# Patient Record
Sex: FEMALE | Race: WHITE | NOT HISPANIC OR LATINO | Employment: OTHER | URBAN - METROPOLITAN AREA
[De-identification: names, ages, dates, MRNs, and addresses within clinical notes are randomized per-mention and may not be internally consistent; named-entity substitution may affect disease eponyms.]

---

## 2017-07-11 ENCOUNTER — HOSPITAL ENCOUNTER (OUTPATIENT)
Dept: OTHER | Facility: HOSPITAL | Age: 72
Discharge: HOME | End: 2017-07-11

## 2018-01-26 ENCOUNTER — CONVERSION ENCOUNTER (OUTPATIENT)
Dept: FAMILY MEDICINE CLINIC | Facility: CLINIC | Age: 73
End: 2018-01-26

## 2018-01-26 ENCOUNTER — OFFICE VISIT CONVERTED (OUTPATIENT)
Dept: FAMILY MEDICINE CLINIC | Facility: CLINIC | Age: 73
End: 2018-01-26
Attending: FAMILY MEDICINE

## 2018-02-06 ENCOUNTER — OFFICE VISIT CONVERTED (OUTPATIENT)
Dept: FAMILY MEDICINE CLINIC | Facility: CLINIC | Age: 73
End: 2018-02-06
Attending: FAMILY MEDICINE

## 2018-09-24 ENCOUNTER — HOSPITAL ENCOUNTER (OUTPATIENT)
Dept: OTHER | Facility: HOSPITAL | Age: 73
Discharge: HOME | End: 2018-09-24

## 2019-01-24 ENCOUNTER — HOSPITAL ENCOUNTER (OUTPATIENT)
Dept: URGENT CARE | Facility: CLINIC | Age: 74
Discharge: HOME | End: 2019-01-24

## 2019-02-12 ENCOUNTER — HOSPITAL ENCOUNTER (OUTPATIENT)
Dept: OTHER | Facility: HOSPITAL | Age: 74
Discharge: HOME | End: 2019-02-12
Attending: NURSE PRACTITIONER

## 2019-02-12 ENCOUNTER — OFFICE VISIT CONVERTED (OUTPATIENT)
Dept: INTERNAL MEDICINE | Facility: CLINIC | Age: 74
End: 2019-02-12
Attending: NURSE PRACTITIONER

## 2019-02-12 ENCOUNTER — CONVERSION ENCOUNTER (OUTPATIENT)
Dept: INTERNAL MEDICINE | Facility: CLINIC | Age: 74
End: 2019-02-12

## 2019-02-12 LAB
ALBUMIN SERPL-MCNC: 4 G/DL (ref 3.5–5)
ALBUMIN/GLOB SERPL: 1.4 {RATIO} (ref 1.4–2.6)
ALP SERPL-CCNC: 79 U/L (ref 43–160)
ALT SERPL-CCNC: 13 U/L (ref 10–40)
ANION GAP SERPL CALC-SCNC: 14 MMOL/L (ref 8–19)
AST SERPL-CCNC: 22 U/L (ref 15–50)
BASOPHILS # BLD AUTO: 0.04 10*3/UL (ref 0–0.2)
BASOPHILS NFR BLD AUTO: 0.61 % (ref 0–3)
BILIRUB SERPL-MCNC: 0.46 MG/DL (ref 0.2–1.3)
BUN SERPL-MCNC: 17 MG/DL (ref 5–25)
BUN/CREAT SERPL: 21 {RATIO} (ref 6–20)
CALCIUM SERPL-MCNC: 9.3 MG/DL (ref 8.7–10.4)
CHLORIDE SERPL-SCNC: 107 MMOL/L (ref 99–111)
CHOLEST SERPL-MCNC: 217 MG/DL (ref 107–200)
CHOLEST/HDLC SERPL: 3.1 {RATIO} (ref 3–6)
CONV CO2: 27 MMOL/L (ref 22–32)
CONV TOTAL PROTEIN: 6.9 G/DL (ref 6.3–8.2)
CREAT UR-MCNC: 0.81 MG/DL (ref 0.5–0.9)
EOSINOPHIL # BLD AUTO: 0.23 10*3/UL (ref 0–0.7)
EOSINOPHIL # BLD AUTO: 3.9 % (ref 0–7)
ERYTHROCYTE [DISTWIDTH] IN BLOOD BY AUTOMATED COUNT: 11.9 % (ref 11.5–14.5)
GFR SERPLBLD BASED ON 1.73 SQ M-ARVRAT: >60 ML/MIN/{1.73_M2}
GLOBULIN UR ELPH-MCNC: 2.9 G/DL (ref 2–3.5)
GLUCOSE SERPL-MCNC: 92 MG/DL (ref 65–99)
HBA1C MFR BLD: 13.5 G/DL (ref 12–16)
HCT VFR BLD AUTO: 41 % (ref 37–47)
HDLC SERPL-MCNC: 69 MG/DL (ref 40–60)
LDLC SERPL CALC-MCNC: 127 MG/DL (ref 70–100)
LYMPHOCYTES # BLD AUTO: 1.22 10*3/UL (ref 1–5)
MCH RBC QN AUTO: 33.2 PG (ref 27–31)
MCHC RBC AUTO-ENTMCNC: 32.8 G/DL (ref 33–37)
MCV RBC AUTO: 101 FL (ref 81–99)
MONOCYTES # BLD AUTO: 0.69 10*3/UL (ref 0.2–1.2)
MONOCYTES NFR BLD AUTO: 11.9 % (ref 3–10)
NEUTROPHILS # BLD AUTO: 3.67 10*3/UL (ref 2–8)
NEUTROPHILS NFR BLD AUTO: 62.8 % (ref 30–85)
NRBC BLD AUTO-RTO: 0 % (ref 0–0.01)
OSMOLALITY SERPL CALC.SUM OF ELEC: 297 MOSM/KG (ref 273–304)
PLATELET # BLD AUTO: 318 10*3/UL (ref 130–400)
PMV BLD AUTO: 7 FL (ref 7.4–10.4)
POTASSIUM SERPL-SCNC: 4.6 MMOL/L (ref 3.5–5.3)
RBC # BLD AUTO: 4.06 10*6/UL (ref 4.2–5.4)
SODIUM SERPL-SCNC: 143 MMOL/L (ref 135–147)
T4 FREE SERPL-MCNC: 1.2 NG/DL (ref 0.9–1.8)
TRIGL SERPL-MCNC: 105 MG/DL (ref 40–150)
TSH SERPL-ACNC: 2.15 M[IU]/L (ref 0.27–4.2)
VARIANT LYMPHS NFR BLD MANUAL: 20.8 % (ref 20–45)
VLDLC SERPL-MCNC: 21 MG/DL (ref 5–37)
WBC # BLD AUTO: 5.84 10*3/UL (ref 4.8–10.8)

## 2019-04-08 ENCOUNTER — HOSPITAL ENCOUNTER (OUTPATIENT)
Dept: OTHER | Facility: HOSPITAL | Age: 74
Discharge: HOME | End: 2019-04-08
Attending: NURSE PRACTITIONER

## 2019-04-08 LAB
IRON SATN MFR SERPL: 12 % (ref 20–55)
IRON SERPL-MCNC: 37 UG/DL (ref 60–170)
TIBC SERPL-MCNC: 303 UG/DL (ref 245–450)
TRANSFERRIN SERPL-MCNC: 212 MG/DL (ref 250–380)

## 2019-04-09 LAB
FOLATE SERPL-MCNC: >20 NG/ML (ref 4.8–20)
VIT B12 SERPL-MCNC: 1830 PG/ML (ref 211–911)

## 2019-05-20 ENCOUNTER — OFFICE VISIT CONVERTED (OUTPATIENT)
Dept: INTERNAL MEDICINE | Facility: CLINIC | Age: 74
End: 2019-05-20
Attending: NURSE PRACTITIONER

## 2019-08-16 ENCOUNTER — HOSPITAL ENCOUNTER (OUTPATIENT)
Dept: OTHER | Facility: HOSPITAL | Age: 74
Discharge: HOME | End: 2019-08-16
Attending: NURSE PRACTITIONER

## 2019-08-16 LAB
ALBUMIN SERPL-MCNC: 4.3 G/DL (ref 3.5–5)
ALBUMIN/GLOB SERPL: 1.7 {RATIO} (ref 1.4–2.6)
ALP SERPL-CCNC: 65 U/L (ref 43–160)
ALT SERPL-CCNC: 31 U/L (ref 10–40)
ANION GAP SERPL CALC-SCNC: 16 MMOL/L (ref 8–19)
AST SERPL-CCNC: 36 U/L (ref 15–50)
BILIRUB SERPL-MCNC: 0.54 MG/DL (ref 0.2–1.3)
BUN SERPL-MCNC: 17 MG/DL (ref 5–25)
BUN/CREAT SERPL: 23 {RATIO} (ref 6–20)
CALCIUM SERPL-MCNC: 9.2 MG/DL (ref 8.7–10.4)
CHLORIDE SERPL-SCNC: 105 MMOL/L (ref 99–111)
CHOLEST SERPL-MCNC: 153 MG/DL (ref 107–200)
CHOLEST/HDLC SERPL: 1.9 {RATIO} (ref 3–6)
CONV CO2: 25 MMOL/L (ref 22–32)
CONV TOTAL PROTEIN: 6.8 G/DL (ref 6.3–8.2)
CREAT UR-MCNC: 0.74 MG/DL (ref 0.5–0.9)
CRP SERPL HS-MCNC: <0.15 MG/DL (ref 0–0.5)
GFR SERPLBLD BASED ON 1.73 SQ M-ARVRAT: >60 ML/MIN/{1.73_M2}
GLOBULIN UR ELPH-MCNC: 2.5 G/DL (ref 2–3.5)
GLUCOSE SERPL-MCNC: 90 MG/DL (ref 65–99)
HDLC SERPL-MCNC: 82 MG/DL (ref 40–60)
LDLC SERPL CALC-MCNC: 51 MG/DL (ref 70–100)
OSMOLALITY SERPL CALC.SUM OF ELEC: 295 MOSM/KG (ref 273–304)
POTASSIUM SERPL-SCNC: 4.3 MMOL/L (ref 3.5–5.3)
SODIUM SERPL-SCNC: 142 MMOL/L (ref 135–147)
TRIGL SERPL-MCNC: 101 MG/DL (ref 40–150)
VLDLC SERPL-MCNC: 20 MG/DL (ref 5–37)

## 2019-08-19 ENCOUNTER — OFFICE VISIT CONVERTED (OUTPATIENT)
Dept: INTERNAL MEDICINE | Facility: CLINIC | Age: 74
End: 2019-08-19
Attending: NURSE PRACTITIONER

## 2019-11-20 ENCOUNTER — CONVERSION ENCOUNTER (OUTPATIENT)
Dept: INTERNAL MEDICINE | Facility: CLINIC | Age: 74
End: 2019-11-20

## 2019-11-20 ENCOUNTER — HOSPITAL ENCOUNTER (OUTPATIENT)
Dept: OTHER | Facility: HOSPITAL | Age: 74
Discharge: HOME | End: 2019-11-20
Attending: INTERNAL MEDICINE

## 2019-11-20 ENCOUNTER — OFFICE VISIT CONVERTED (OUTPATIENT)
Dept: INTERNAL MEDICINE | Facility: CLINIC | Age: 74
End: 2019-11-20
Attending: INTERNAL MEDICINE

## 2019-11-20 LAB
ALBUMIN SERPL-MCNC: 4.5 G/DL (ref 3.5–5)
ALBUMIN/GLOB SERPL: 1.6 {RATIO} (ref 1.4–2.6)
ALP SERPL-CCNC: 73 U/L (ref 43–160)
ALT SERPL-CCNC: 29 U/L (ref 10–40)
ANION GAP SERPL CALC-SCNC: 18 MMOL/L (ref 8–19)
AST SERPL-CCNC: 34 U/L (ref 15–50)
BASOPHILS # BLD AUTO: 0.05 10*3/UL (ref 0–0.2)
BASOPHILS NFR BLD AUTO: 0.6 % (ref 0–3)
BILIRUB SERPL-MCNC: 0.61 MG/DL (ref 0.2–1.3)
BUN SERPL-MCNC: 20 MG/DL (ref 5–25)
BUN/CREAT SERPL: 24 {RATIO} (ref 6–20)
CALCIUM SERPL-MCNC: 9.4 MG/DL (ref 8.7–10.4)
CHLORIDE SERPL-SCNC: 97 MMOL/L (ref 99–111)
CHOLEST SERPL-MCNC: 176 MG/DL (ref 107–200)
CHOLEST/HDLC SERPL: 2.3 {RATIO} (ref 3–6)
CONV ABS IMM GRAN: 0.04 10*3/UL (ref 0–0.2)
CONV CO2: 25 MMOL/L (ref 22–32)
CONV IMMATURE GRAN: 0.5 % (ref 0–1.8)
CONV TOTAL PROTEIN: 7.4 G/DL (ref 6.3–8.2)
CREAT UR-MCNC: 0.83 MG/DL (ref 0.5–0.9)
DEPRECATED RDW RBC AUTO: 47.8 FL (ref 36.4–46.3)
EOSINOPHIL # BLD AUTO: 0.1 10*3/UL (ref 0–0.7)
EOSINOPHIL # BLD AUTO: 1.1 % (ref 0–7)
ERYTHROCYTE [DISTWIDTH] IN BLOOD BY AUTOMATED COUNT: 12.8 % (ref 11.7–14.4)
FERRITIN SERPL-MCNC: 535 NG/ML (ref 10–200)
GFR SERPLBLD BASED ON 1.73 SQ M-ARVRAT: >60 ML/MIN/{1.73_M2}
GLOBULIN UR ELPH-MCNC: 2.9 G/DL (ref 2–3.5)
GLUCOSE SERPL-MCNC: 90 MG/DL (ref 65–99)
HCT VFR BLD AUTO: 42.7 % (ref 37–47)
HDLC SERPL-MCNC: 76 MG/DL (ref 40–60)
HGB BLD-MCNC: 14.5 G/DL (ref 12–16)
IRON SATN MFR SERPL: 44 % (ref 20–55)
IRON SERPL-MCNC: 146 UG/DL (ref 60–170)
LDLC SERPL CALC-MCNC: 79 MG/DL (ref 70–100)
LYMPHOCYTES # BLD AUTO: 1.19 10*3/UL (ref 1–5)
LYMPHOCYTES NFR BLD AUTO: 13.5 % (ref 20–45)
MCH RBC QN AUTO: 34 PG (ref 27–31)
MCHC RBC AUTO-ENTMCNC: 34 G/DL (ref 33–37)
MCV RBC AUTO: 100 FL (ref 81–99)
MONOCYTES # BLD AUTO: 0.72 10*3/UL (ref 0.2–1.2)
MONOCYTES NFR BLD AUTO: 8.1 % (ref 3–10)
NEUTROPHILS # BLD AUTO: 6.74 10*3/UL (ref 2–8)
NEUTROPHILS NFR BLD AUTO: 76.2 % (ref 30–85)
NRBC CBCN: 0 % (ref 0–0.7)
OSMOLALITY SERPL CALC.SUM OF ELEC: 284 MOSM/KG (ref 273–304)
PLATELET # BLD AUTO: 309 10*3/UL (ref 130–400)
PMV BLD AUTO: 9.7 FL (ref 9.4–12.3)
POTASSIUM SERPL-SCNC: 4.3 MMOL/L (ref 3.5–5.3)
RBC # BLD AUTO: 4.27 10*6/UL (ref 4.2–5.4)
SODIUM SERPL-SCNC: 136 MMOL/L (ref 135–147)
TIBC SERPL-MCNC: 330 UG/DL (ref 245–450)
TRANSFERRIN SERPL-MCNC: 231 MG/DL (ref 250–380)
TRIGL SERPL-MCNC: 106 MG/DL (ref 40–150)
TSH SERPL-ACNC: 2.43 M[IU]/L (ref 0.27–4.2)
VLDLC SERPL-MCNC: 21 MG/DL (ref 5–37)
WBC # BLD AUTO: 8.84 10*3/UL (ref 4.8–10.8)

## 2019-12-04 ENCOUNTER — OFFICE VISIT CONVERTED (OUTPATIENT)
Dept: INTERNAL MEDICINE | Facility: CLINIC | Age: 74
End: 2019-12-04
Attending: INTERNAL MEDICINE

## 2019-12-04 ENCOUNTER — CONVERSION ENCOUNTER (OUTPATIENT)
Dept: INTERNAL MEDICINE | Facility: CLINIC | Age: 74
End: 2019-12-04

## 2020-01-06 ENCOUNTER — HOSPITAL ENCOUNTER (OUTPATIENT)
Dept: OTHER | Facility: HOSPITAL | Age: 75
Discharge: HOME | End: 2020-01-06

## 2020-01-13 ENCOUNTER — HOSPITAL ENCOUNTER (OUTPATIENT)
Dept: OTHER | Facility: HOSPITAL | Age: 75
Discharge: HOME | End: 2020-01-13

## 2020-01-22 ENCOUNTER — OFFICE VISIT CONVERTED (OUTPATIENT)
Dept: PULMONOLOGY | Facility: CLINIC | Age: 75
End: 2020-01-22
Attending: INTERNAL MEDICINE

## 2020-01-23 ENCOUNTER — OFFICE VISIT CONVERTED (OUTPATIENT)
Dept: INTERNAL MEDICINE | Facility: CLINIC | Age: 75
End: 2020-01-23
Attending: INTERNAL MEDICINE

## 2020-01-23 ENCOUNTER — CONVERSION ENCOUNTER (OUTPATIENT)
Dept: INTERNAL MEDICINE | Facility: CLINIC | Age: 75
End: 2020-01-23

## 2020-01-31 ENCOUNTER — HOSPITAL ENCOUNTER (OUTPATIENT)
Dept: OTHER | Facility: HOSPITAL | Age: 75
Discharge: HOME | End: 2020-01-31

## 2020-01-31 LAB
25(OH)D3 SERPL-MCNC: 54.5 NG/ML (ref 30–100)
BASOPHILS # BLD AUTO: 0.05 10*3/UL (ref 0–0.2)
BASOPHILS NFR BLD AUTO: 0.5 % (ref 0–3)
CONV ABS IMM GRAN: 0.02 10*3/UL (ref 0–0.2)
CONV IMMATURE GRAN: 0.2 % (ref 0–1.8)
DEPRECATED RDW RBC AUTO: 49.7 FL (ref 36.4–46.3)
EOSINOPHIL # BLD AUTO: 0.14 10*3/UL (ref 0–0.7)
EOSINOPHIL # BLD AUTO: 1.5 % (ref 0–7)
ERYTHROCYTE [DISTWIDTH] IN BLOOD BY AUTOMATED COUNT: 13.3 % (ref 11.7–14.4)
FERRITIN SERPL-MCNC: 562 NG/ML (ref 10–200)
HCT VFR BLD AUTO: 40.9 % (ref 37–47)
HGB BLD-MCNC: 13.6 G/DL (ref 12–16)
LYMPHOCYTES # BLD AUTO: 1.63 10*3/UL (ref 1–5)
LYMPHOCYTES NFR BLD AUTO: 17.4 % (ref 20–45)
MCH RBC QN AUTO: 33.4 PG (ref 27–31)
MCHC RBC AUTO-ENTMCNC: 33.3 G/DL (ref 33–37)
MCV RBC AUTO: 100.5 FL (ref 81–99)
MONOCYTES # BLD AUTO: 0.85 10*3/UL (ref 0.2–1.2)
MONOCYTES NFR BLD AUTO: 9.1 % (ref 3–10)
NEUTROPHILS # BLD AUTO: 6.69 10*3/UL (ref 2–8)
NEUTROPHILS NFR BLD AUTO: 71.3 % (ref 30–85)
NRBC CBCN: 0 % (ref 0–0.7)
PLATELET # BLD AUTO: 258 10*3/UL (ref 130–400)
PMV BLD AUTO: 9.9 FL (ref 9.4–12.3)
RBC # BLD AUTO: 4.07 10*6/UL (ref 4.2–5.4)
TSH SERPL-ACNC: 1.87 M[IU]/L (ref 0.27–4.2)
WBC # BLD AUTO: 9.38 10*3/UL (ref 4.8–10.8)

## 2020-03-05 ENCOUNTER — HOSPITAL ENCOUNTER (OUTPATIENT)
Dept: CARDIOLOGY | Facility: HOSPITAL | Age: 75
Discharge: HOME | End: 2020-03-05
Attending: INTERNAL MEDICINE

## 2020-04-15 ENCOUNTER — OFFICE VISIT CONVERTED (OUTPATIENT)
Dept: PULMONOLOGY | Facility: CLINIC | Age: 75
End: 2020-04-15
Attending: INTERNAL MEDICINE

## 2020-06-15 ENCOUNTER — HOSPITAL ENCOUNTER (OUTPATIENT)
Dept: OTHER | Facility: HOSPITAL | Age: 75
Discharge: HOME | End: 2020-06-15
Attending: INTERNAL MEDICINE

## 2020-07-15 ENCOUNTER — HOSPITAL ENCOUNTER (OUTPATIENT)
Dept: OTHER | Facility: HOSPITAL | Age: 75
Discharge: HOME | End: 2020-07-15
Attending: INTERNAL MEDICINE

## 2020-07-15 ENCOUNTER — OFFICE VISIT CONVERTED (OUTPATIENT)
Dept: PULMONOLOGY | Facility: CLINIC | Age: 75
End: 2020-07-15
Attending: INTERNAL MEDICINE

## 2020-07-16 LAB
DSDNA AB SER-ACNC: NEGATIVE [IU]/ML
ENA AB SER IA-ACNC: NEGATIVE {RATIO}

## 2020-07-18 LAB
CONV ANTI NEUTROPHILIC CYTOPLASMIC AB W/REFLEX: NORMAL
CONV QUANTIFERON TB GOLD: POSITIVE
QUANTIFERON CRITERIA: ABNORMAL
QUANTIFERON MITOGEN VALUE: >10 IU/ML
QUANTIFERON NIL VALUE: 0.03 IU/ML
QUANTIFERON TB1 AG VALUE: 0.44 IU/ML
QUANTIFERON TB2 AG VALUE: 0.29 IU/ML

## 2020-07-19 LAB
CONV 1,3-BETA-D-GLUCAN INTERPRETATION: NEGATIVE
CONV 1,3-BETA-D-GLUCAN: <31 PG/ML

## 2020-07-20 ENCOUNTER — HOSPITAL ENCOUNTER (OUTPATIENT)
Dept: PET IMAGING | Facility: HOSPITAL | Age: 75
Discharge: HOME | End: 2020-07-20
Attending: INTERNAL MEDICINE

## 2020-07-20 LAB — IGE SERPL-ACNC: 92 K[IU]/ML (ref 0–24)

## 2020-07-30 ENCOUNTER — HOSPITAL ENCOUNTER (OUTPATIENT)
Dept: OTHER | Facility: HOSPITAL | Age: 75
Discharge: HOME | End: 2020-07-30
Attending: INTERNAL MEDICINE

## 2020-07-30 ENCOUNTER — OFFICE VISIT CONVERTED (OUTPATIENT)
Dept: INTERNAL MEDICINE | Facility: CLINIC | Age: 75
End: 2020-07-30
Attending: INTERNAL MEDICINE

## 2020-07-30 LAB
ALBUMIN SERPL-MCNC: 4.4 G/DL (ref 3.5–5)
ALBUMIN/GLOB SERPL: 1.4 {RATIO} (ref 1.4–2.6)
ALP SERPL-CCNC: 84 U/L (ref 43–160)
ALT SERPL-CCNC: 30 U/L (ref 10–40)
ANION GAP SERPL CALC-SCNC: 18 MMOL/L (ref 8–19)
AST SERPL-CCNC: 41 U/L (ref 15–50)
BASOPHILS # BLD AUTO: 0.04 10*3/UL (ref 0–0.2)
BASOPHILS NFR BLD AUTO: 0.6 % (ref 0–3)
BILIRUB SERPL-MCNC: 0.41 MG/DL (ref 0.2–1.3)
BUN SERPL-MCNC: 14 MG/DL (ref 5–25)
BUN/CREAT SERPL: 15 {RATIO} (ref 6–20)
CALCIUM SERPL-MCNC: 10.5 MG/DL (ref 8.7–10.4)
CHLORIDE SERPL-SCNC: 99 MMOL/L (ref 99–111)
CHOLEST SERPL-MCNC: 146 MG/DL (ref 107–200)
CHOLEST/HDLC SERPL: 2.1 {RATIO} (ref 3–6)
CONV ABS IMM GRAN: 0.02 10*3/UL (ref 0–0.2)
CONV CO2: 27 MMOL/L (ref 22–32)
CONV HIV-1/ HIV-2: NONREACTIVE
CONV IMMATURE GRAN: 0.3 % (ref 0–1.8)
CONV TOTAL PROTEIN: 7.6 G/DL (ref 6.3–8.2)
CREAT UR-MCNC: 0.91 MG/DL (ref 0.5–0.9)
DEPRECATED RDW RBC AUTO: 50.2 FL (ref 36.4–46.3)
EOSINOPHIL # BLD AUTO: 0.08 10*3/UL (ref 0–0.7)
EOSINOPHIL # BLD AUTO: 1.2 % (ref 0–7)
ERYTHROCYTE [DISTWIDTH] IN BLOOD BY AUTOMATED COUNT: 13.4 % (ref 11.7–14.4)
GFR SERPLBLD BASED ON 1.73 SQ M-ARVRAT: >60 ML/MIN/{1.73_M2}
GLOBULIN UR ELPH-MCNC: 3.2 G/DL (ref 2–3.5)
GLUCOSE SERPL-MCNC: 84 MG/DL (ref 65–99)
HCT VFR BLD AUTO: 43.7 % (ref 37–47)
HDLC SERPL-MCNC: 70 MG/DL (ref 40–60)
HGB BLD-MCNC: 14.1 G/DL (ref 12–16)
LDLC SERPL CALC-MCNC: 51 MG/DL (ref 70–100)
LYMPHOCYTES # BLD AUTO: 1.28 10*3/UL (ref 1–5)
LYMPHOCYTES NFR BLD AUTO: 19.5 % (ref 20–45)
MCH RBC QN AUTO: 32.5 PG (ref 27–31)
MCHC RBC AUTO-ENTMCNC: 32.3 G/DL (ref 33–37)
MCV RBC AUTO: 100.7 FL (ref 81–99)
MONOCYTES # BLD AUTO: 0.65 10*3/UL (ref 0.2–1.2)
MONOCYTES NFR BLD AUTO: 9.9 % (ref 3–10)
NEUTROPHILS # BLD AUTO: 4.5 10*3/UL (ref 2–8)
NEUTROPHILS NFR BLD AUTO: 68.5 % (ref 30–85)
NRBC CBCN: 0 % (ref 0–0.7)
OSMOLALITY SERPL CALC.SUM OF ELEC: 290 MOSM/KG (ref 273–304)
PLATELET # BLD AUTO: 293 10*3/UL (ref 130–400)
PMV BLD AUTO: 9.5 FL (ref 9.4–12.3)
POTASSIUM SERPL-SCNC: 4.1 MMOL/L (ref 3.5–5.3)
RBC # BLD AUTO: 4.34 10*6/UL (ref 4.2–5.4)
SODIUM SERPL-SCNC: 140 MMOL/L (ref 135–147)
TRIGL SERPL-MCNC: 126 MG/DL (ref 40–150)
TSH SERPL-ACNC: 2.36 M[IU]/L (ref 0.27–4.2)
VLDLC SERPL-MCNC: 25 MG/DL (ref 5–37)
WBC # BLD AUTO: 6.57 10*3/UL (ref 4.8–10.8)

## 2020-07-31 LAB
CONV HEPATITIS C AB WITH REFLEX TO CONFIRMATION: <0.1 S/CO RATIO (ref 0–0.9)
CONV HEPATITIS COMMENT: NORMAL

## 2020-08-12 ENCOUNTER — OFFICE VISIT CONVERTED (OUTPATIENT)
Dept: PULMONOLOGY | Facility: CLINIC | Age: 75
End: 2020-08-12
Attending: INTERNAL MEDICINE

## 2020-08-21 ENCOUNTER — HOSPITAL ENCOUNTER (OUTPATIENT)
Dept: FAMILY MEDICINE CLINIC | Facility: CLINIC | Age: 75
Discharge: HOME | End: 2020-08-21
Attending: INTERNAL MEDICINE

## 2020-08-23 LAB — SARS-COV-2 RNA SPEC QL NAA+PROBE: NOT DETECTED

## 2020-08-26 ENCOUNTER — HOSPITAL ENCOUNTER (OUTPATIENT)
Dept: GASTROENTEROLOGY | Facility: HOSPITAL | Age: 75
Setting detail: HOSPITAL OUTPATIENT SURGERY
Discharge: HOME | End: 2020-08-26
Attending: INTERNAL MEDICINE

## 2020-08-26 LAB
EPI CELLS NFR FLD: 0 %
LYMPHOCYTES NFR FLD MANUAL: 2 %
MACROPHAGE FLUID: 4 /100{WBCS}
NEUTROPHILS NFR FLD MANUAL: 94 %
VISUAL PRESENCE OF BLOOD: NORMAL

## 2020-08-29 LAB
BACTERIA SPEC AEROBE CULT: NORMAL
CONV BRONCHIAL WASH CULTURE: NORMAL

## 2020-09-01 LAB — CONV NOCARDIA STAIN (PARTIAL,MODIFIED ACID FAST): NORMAL

## 2020-09-02 LAB
CMV DNA SPEC QL NAA+PROBE: NOT DETECTED
CONV CYTOMEGALOVIRUS VIRUS SOURCE: NORMAL

## 2020-09-03 LAB
CONV HERPES SIMPLEX VIRUS QUAL. PCR: NOT DETECTED
HERPES SIMPLEX VIRUS SOURCE: NORMAL

## 2020-09-07 LAB — CONV LEGIONELLA CULTURE: NORMAL

## 2020-09-14 ENCOUNTER — OFFICE VISIT CONVERTED (OUTPATIENT)
Dept: PULMONOLOGY | Facility: CLINIC | Age: 75
End: 2020-09-14
Attending: PHYSICIAN ASSISTANT

## 2020-11-09 ENCOUNTER — OFFICE VISIT (OUTPATIENT)
Dept: INFECTIOUS DISEASES | Facility: CLINIC | Age: 75
End: 2020-11-09

## 2020-11-09 VITALS
DIASTOLIC BLOOD PRESSURE: 78 MMHG | SYSTOLIC BLOOD PRESSURE: 148 MMHG | TEMPERATURE: 97 F | HEIGHT: 64 IN | BODY MASS INDEX: 23.18 KG/M2 | HEART RATE: 54 BPM | WEIGHT: 135.8 LBS

## 2020-11-09 DIAGNOSIS — Z22.7 TB LUNG, LATENT: ICD-10-CM

## 2020-11-09 DIAGNOSIS — B49 FUNGAL PNEUMONIA: Primary | ICD-10-CM

## 2020-11-09 DIAGNOSIS — J16.8 FUNGAL PNEUMONIA: Primary | ICD-10-CM

## 2020-11-09 PROCEDURE — 99205 OFFICE O/P NEW HI 60 MIN: CPT | Performed by: INTERNAL MEDICINE

## 2020-11-09 RX ORDER — MELATONIN 10 MG
TABLET, SUBLINGUAL SUBLINGUAL
COMMUNITY

## 2020-11-09 RX ORDER — ESOMEPRAZOLE MAGNESIUM 40 MG/1
CAPSULE, DELAYED RELEASE ORAL
COMMUNITY
Start: 2020-09-21 | End: 2021-09-30

## 2020-11-09 RX ORDER — HYDROCHLOROTHIAZIDE 25 MG/1
25 TABLET ORAL DAILY
COMMUNITY
End: 2021-07-01 | Stop reason: SDUPTHER

## 2020-11-09 RX ORDER — CYCLOSPORINE 0.5 MG/ML
EMULSION OPHTHALMIC
COMMUNITY
Start: 2020-10-02 | End: 2021-09-30

## 2020-11-09 RX ORDER — CALCIUM CARBONATE 260MG(650)
TABLET,CHEWABLE ORAL
COMMUNITY
End: 2021-09-30

## 2020-11-09 RX ORDER — ASPIRIN 81 MG/1
81 TABLET, CHEWABLE ORAL DAILY
COMMUNITY
End: 2022-09-29

## 2020-11-09 RX ORDER — METOPROLOL SUCCINATE AND HYDROCHLOROTHIAZIDE 12.5; 5 MG/1; MG/1
TABLET ORAL
COMMUNITY
End: 2020-11-09

## 2020-11-09 RX ORDER — METOPROLOL TARTRATE 50 MG/1
50 TABLET, FILM COATED ORAL 2 TIMES DAILY
COMMUNITY
End: 2021-07-02 | Stop reason: SDUPTHER

## 2020-11-09 NOTE — PROGRESS NOTES
cc: This very nice 75-year-old we are asked for evaluation opinion regarding possible fungal pneumonia.    Per the patient as well as the patient's daughter, she says that she underwent a lobectomy for suspected cancer in 2013 but ended up having Aspergillus species.  She did well and establish care with a pulmonologist in spring 2024 care of COPD.  Because she not had follow-up imaging she underwent a repeat surveillance CAT scan of the lungs on Kadie 15, 2020.  This showed a noncalcified right middle lobe nodule.  This was followed up with a PET CT scan on 7/20/2020 which showed the nodule to be decreasing in size and not associated with any hypermetabolic activity.  Patient says that she was exposed to tuberculosis is a 4-year-old child from her father who ultimately succumbed to the infection.  She has had multiple positive PPD since that time but screening chest x-rays were all negative.  Her daughter is also present in the exam room today and says that she thinks her mother took a course of rifampin for prophylaxis although the patient does not have recollection of this.  This was many years ago and they have not been able to get records of this.  She is not on any immunosuppression.    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  The patient is an avid , but not immunocompromised or on any immunosuppression.  Her doctor started her on voriconazole 200 mg 3 times daily but she had disabling visual hallucinations so discontinued this.  She denies any cardiopulmonary symptoms or constitutional symptoms of infection such as fever, chills, night sweats.  She currently feels quite well.    Past medical history: Coronary artery disease, hypertension, peptic ulcer disease, COPD, appendectomy, hysterectomy, cholecystectomy, right ankle screw placement  Family history notable for TB in her father  Social history she lives in [place], [place] with her .  She quit smoking 30 years  "ago.    Review of Systems: All other reviewed and negative except as per HPI    Blood pressure 148/78, pulse 54, temperature 97 °F (36.1 °C), height 162.6 cm (64\"), weight 61.6 kg (135 lb 12.8 oz).  GENERAL: Awake and alert, in no acute distress.   HEENT: Oropharynx is clear. Hearing is grossly normal.   EYES: PERRL. No conjunctival injection. No lid lag.   LYMPHATICS: No lymphadenopathy of the neck or axillary regions.   HEART: Regular rate and rhythm. No peripheral edema.   LUNGS: Clear to auscultation anteriorly with normal respiratory effort.   ABDOMEN: Soft, nontender, nondistended. No appreciable organomegaly.   SKIN: Warm and dry without cutaneous eruptions   PSYCHIATRIC: Appropriate mood, affect, insight, and judgment.       DIAGNOSTICS:      Assessment and Plan  1.  Right middle lobe lung nodule, possible fungal pneumonia: Firstly, I do not have results of the microbiology from her bronchoscopy.  We will obtain these.  By report, the cultures grew Fusarium species.  The significance of the Fusarium is not abundantly clear.  This could be an environmental contaminant, but even if true should not require treatment given her competent immune system.  This is evidenced by the fact that she had had improvement in her imaging even prior to initiation of any antifungal therapy.  I discussed with the family that the side effects from the voriconazole are due to high serum levels of the voriconazole and that if she ever needed to take voriconazole in the future she could just start this at a lower dose.    I recommended that she have another CAT scan of the chest just to document stability and further improvement of the lung nodules.  I would recommend getting this at 6 months from her PET scan so early 2021.  I have gone ahead and ordered at this here in [place], but she is going to reach out to her PCP to see if it could be ordered closer to her residence.    2.  Latent tuberculosis: She should be low risk for " conversion to active TB at this time.  I discussed the chances of conversion to active TB could be higher if she were to require any immunosuppression in the future, but she is not interested in taking any type of prophylaxis at this time (and may have taken prophylaxis earlier which would obviate the need for additional prophylaxis.)    She is going to come back and see me in about 4 months or sooner if necessary

## 2020-12-22 ENCOUNTER — TELEPHONE (OUTPATIENT)
Dept: INFECTIOUS DISEASES | Facility: CLINIC | Age: 75
End: 2020-12-22

## 2021-01-25 ENCOUNTER — HOSPITAL ENCOUNTER (OUTPATIENT)
Dept: CT IMAGING | Facility: HOSPITAL | Age: 76
Discharge: HOME | End: 2021-01-25
Admitting: INTERNAL MEDICINE

## 2021-01-25 DIAGNOSIS — B49 FUNGAL PNEUMONIA: ICD-10-CM

## 2021-01-25 DIAGNOSIS — J16.8 FUNGAL PNEUMONIA: ICD-10-CM

## 2021-01-25 PROCEDURE — 71250 CT THORAX DX C-: CPT

## 2021-02-02 ENCOUNTER — OFFICE VISIT CONVERTED (OUTPATIENT)
Dept: INTERNAL MEDICINE | Facility: CLINIC | Age: 76
End: 2021-02-02
Attending: INTERNAL MEDICINE

## 2021-02-02 ENCOUNTER — HOSPITAL ENCOUNTER (OUTPATIENT)
Dept: OTHER | Facility: HOSPITAL | Age: 76
Discharge: HOME | End: 2021-02-02
Attending: INTERNAL MEDICINE

## 2021-02-02 LAB
ALBUMIN SERPL-MCNC: 4.2 G/DL (ref 3.5–5)
ALBUMIN/GLOB SERPL: 1.5 {RATIO} (ref 1.4–2.6)
ALP SERPL-CCNC: 133 U/L (ref 43–160)
ALT SERPL-CCNC: 24 U/L (ref 10–40)
ANION GAP SERPL CALC-SCNC: 13 MMOL/L (ref 8–19)
AST SERPL-CCNC: 31 U/L (ref 15–50)
BASOPHILS # BLD AUTO: 0.03 10*3/UL (ref 0–0.2)
BASOPHILS NFR BLD AUTO: 0.6 % (ref 0–3)
BILIRUB SERPL-MCNC: 0.4 MG/DL (ref 0.2–1.3)
BUN SERPL-MCNC: 17 MG/DL (ref 5–25)
BUN/CREAT SERPL: 20 {RATIO} (ref 6–20)
CALCIUM SERPL-MCNC: 9.3 MG/DL (ref 8.7–10.4)
CHLORIDE SERPL-SCNC: 105 MMOL/L (ref 99–111)
CHOLEST SERPL-MCNC: 136 MG/DL (ref 107–200)
CHOLEST/HDLC SERPL: 1.9 {RATIO} (ref 3–6)
CONV ABS IMM GRAN: 0.01 10*3/UL (ref 0–0.2)
CONV CO2: 27 MMOL/L (ref 22–32)
CONV IMMATURE GRAN: 0.2 % (ref 0–1.8)
CONV TOTAL PROTEIN: 7 G/DL (ref 6.3–8.2)
CREAT UR-MCNC: 0.86 MG/DL (ref 0.5–0.9)
DEPRECATED RDW RBC AUTO: 48.1 FL (ref 36.4–46.3)
EOSINOPHIL # BLD AUTO: 0.14 10*3/UL (ref 0–0.7)
EOSINOPHIL # BLD AUTO: 2.7 % (ref 0–7)
ERYTHROCYTE [DISTWIDTH] IN BLOOD BY AUTOMATED COUNT: 13.3 % (ref 11.7–14.4)
FERRITIN SERPL-MCNC: 467 NG/ML (ref 10–200)
GFR SERPLBLD BASED ON 1.73 SQ M-ARVRAT: >60 ML/MIN/{1.73_M2}
GLOBULIN UR ELPH-MCNC: 2.8 G/DL (ref 2–3.5)
GLUCOSE SERPL-MCNC: 92 MG/DL (ref 65–99)
HCT VFR BLD AUTO: 42 % (ref 37–47)
HDLC SERPL-MCNC: 71 MG/DL (ref 40–60)
HGB BLD-MCNC: 13.9 G/DL (ref 12–16)
IRON SATN MFR SERPL: 34 % (ref 20–55)
IRON SERPL-MCNC: 101 UG/DL (ref 60–170)
LDLC SERPL CALC-MCNC: 49 MG/DL (ref 70–100)
LYMPHOCYTES # BLD AUTO: 1.2 10*3/UL (ref 1–5)
LYMPHOCYTES NFR BLD AUTO: 23 % (ref 20–45)
MCH RBC QN AUTO: 32.6 PG (ref 27–31)
MCHC RBC AUTO-ENTMCNC: 33.1 G/DL (ref 33–37)
MCV RBC AUTO: 98.4 FL (ref 81–99)
MONOCYTES # BLD AUTO: 0.5 10*3/UL (ref 0.2–1.2)
MONOCYTES NFR BLD AUTO: 9.6 % (ref 3–10)
NEUTROPHILS # BLD AUTO: 3.34 10*3/UL (ref 2–8)
NEUTROPHILS NFR BLD AUTO: 63.9 % (ref 30–85)
NRBC CBCN: 0 % (ref 0–0.7)
OSMOLALITY SERPL CALC.SUM OF ELEC: 293 MOSM/KG (ref 273–304)
PLATELET # BLD AUTO: 269 10*3/UL (ref 130–400)
PMV BLD AUTO: 9.5 FL (ref 9.4–12.3)
POTASSIUM SERPL-SCNC: 4.4 MMOL/L (ref 3.5–5.3)
RBC # BLD AUTO: 4.27 10*6/UL (ref 4.2–5.4)
SODIUM SERPL-SCNC: 141 MMOL/L (ref 135–147)
TIBC SERPL-MCNC: 295 UG/DL (ref 245–450)
TRANSFERRIN SERPL-MCNC: 206 MG/DL (ref 250–380)
TRIGL SERPL-MCNC: 81 MG/DL (ref 40–150)
TSH SERPL-ACNC: 2.19 M[IU]/L (ref 0.27–4.2)
VLDLC SERPL-MCNC: 16 MG/DL (ref 5–37)
WBC # BLD AUTO: 5.22 10*3/UL (ref 4.8–10.8)

## 2021-03-08 ENCOUNTER — OFFICE VISIT (OUTPATIENT)
Dept: INFECTIOUS DISEASES | Facility: CLINIC | Age: 76
End: 2021-03-08

## 2021-03-08 VITALS
BODY MASS INDEX: 23.46 KG/M2 | SYSTOLIC BLOOD PRESSURE: 117 MMHG | WEIGHT: 137.4 LBS | DIASTOLIC BLOOD PRESSURE: 75 MMHG | HEART RATE: 78 BPM | TEMPERATURE: 97.7 F | HEIGHT: 64 IN

## 2021-03-08 DIAGNOSIS — B49 FUNGAL PNEUMONIA: Primary | ICD-10-CM

## 2021-03-08 DIAGNOSIS — Z22.7 TB LUNG, LATENT: ICD-10-CM

## 2021-03-08 DIAGNOSIS — J16.8 FUNGAL PNEUMONIA: Primary | ICD-10-CM

## 2021-03-08 PROCEDURE — 99214 OFFICE O/P EST MOD 30 MIN: CPT | Performed by: INTERNAL MEDICINE

## 2021-03-08 RX ORDER — DIPHENHYDRAMINE HYDROCHLORIDE 25 MG/1
TABLET ORAL
COMMUNITY
End: 2021-09-30

## 2021-03-08 RX ORDER — MULTIPLE VITAMINS W/ MINERALS TAB 9MG-400MCG
1 TAB ORAL DAILY
COMMUNITY
End: 2022-07-12

## 2021-03-08 NOTE — PROGRESS NOTES
cc: This very nice 75-year-old we are asked for evaluation opinion regarding possible fungal pneumonia.    Per initial clinic note:, she says that she underwent a lobectomy for suspected cancer in 2013 but ended up having Aspergillus species.  She did well and establish care with a pulmonologist in spring 2024 care of COPD.  Because she not had follow-up imaging she underwent a repeat surveillance CAT scan of the lungs on Kadie 15, 2020.  This showed a noncalcified right middle lobe nodule.  This was followed up with a PET CT scan on 7/20/2020 which showed the nodule to be decreasing in size and not associated with any hypermetabolic activity.  Patient says that she was exposed to tuberculosis is a 4-year-old child from her father who ultimately succumbed to the infection.  She has had multiple positive PPD since that time but screening chest x-rays were all negative.  Her daughter is also present in the exam room today and says that she thinks her mother took a course of rifampin for prophylaxis although the patient does not have recollection of this.  This was many years ago and they have not been able to get records of this.  She is not on any immunosuppression.    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  The patient is an avid , but not immunocompromised or on any immunosuppression.  Her doctor started her on voriconazole 200 mg 3 times daily but she had disabling visual hallucinations so discontinued this.  She denies any cardiopulmonary symptoms or constitutional symptoms of infection such as fever, chills, night sweats.  She currently feels quite well.    At last clinic visit, she was monitored off voriconazole.  Since that time she reports that she still little.  She has some chronic dyspnea on exertion related to her COPD but this is not changed.  No cough.  No fevers, chills, night sweats, weight loss.  No hemoptysis.  Had interval CT scan as below    Past medical history:  "Coronary artery disease, hypertension, peptic ulcer disease, COPD, appendectomy, hysterectomy, cholecystectomy, right ankle screw placement  Family history notable for TB in her father  Social history she lives in [place], [place] with her .  She quit smoking 30 years ago.    Review of Systems: All other reviewed and negative except as per HPI    Blood pressure 117/75, pulse 78, temperature 97.7 °F (36.5 °C), height 162.6 cm (64.02\"), weight 62.3 kg (137 lb 6.4 oz).  GENERAL: Awake and alert, in no acute distress.   LUNGS:  normal respiratory effort.   PSYCHIATRIC: Appropriate mood, affect, insight, and judgment.       DIAGNOSTICS:  CT chest shows findings of right upper lobe nodularity with sub-5 mm nodules scattered throughout both lungs.  I reviewed her lung images myself and really do not see anything concerning for fungal pneumonia    August 2020 bronchoscopy results were obtained from outside hospital and grew normal ori, Fusarium species, penicillium species and were negative for AFB x2 at 6 weeks    Assessment and Plan  1.  Right middle lobe lung nodule, possible fungal pneumonia: CT scan is stable.  She just has sub-5 mm nodules which have been stable to improving based on serial imaging.  She remains asymptomatic and I would not recommend any additional infectious work-up at this time.  2.  Latent tuberculosis: She should be low risk for conversion to active TB at this time.  I discussed the chances of conversion to active TB could be higher if she were to require any immunosuppression in the future, but she is not interested in taking any type of prophylaxis at this time (and may have taken prophylaxis earlier which would obviate the need for additional prophylaxis.)    I have not scheduled her follow-up appointment, but she can return to see me anytime she needs    "

## 2021-05-07 NOTE — PROGRESS NOTES
Progress Note      Patient Name: Lianne Bardales   Patient ID: [id number]   Sex: Female   YOB: 1945        Visit Date: February 6, 2018    Provider: Andie Crowell MD   Location: [facility]   Location Address: [address] [place] [address]  [id number]   Location Phone: [phone]          Chief Complaint  · Annual Wellness Exam      History Of Present Illness  The patient is a 72 year old /White female who has come to this office for her Annual Wellness Visit. Her Primary Care Provider is Andie Crowell MD. Her comprehensive Care Team list, including suppliers, has been updated on the Facesheet. Her medical/family history, height, weight, BMI, and blood pressure have been reviewed and are in the chart. The Health Risk Assessment has been completed and scanned in the chart.   Medications are listed in the medication list.   The Mini-Cog has been administered and is scanned in chart. The results are negative. Her cognitive function is without limitation.   A hearing loss screen was completed today and the result is negative.   Patient does not have any risk factors for depression. Patient completed the PHQ-9 today and it has been scanned in the chart. The total score is 1-4 indicating minimal depression.   The Timed-Up-and-Go screen was administered today and the result is negative.   The [place] Index of [place] in ADLs indicated full function (score of 6).   A Falls Risk Assessment has been completed, including a review of home fall hazards and medication review.   Overall, the patient's functional ability is noted by this provider to be within normal limits. Her level of safety is noted to be within normal limits. Her balance/gait is within normal limits. There have been no falls in the past year. Patient-specific home safety recommendations have been reviewed and a copy has been given to patient.   She denies issues with leaking urine.   There are  no additional risk factors identified.   Living Will/Advanced Directive has not previously been completed.   Personalized health advice was given to the patient and a written health screening schedule was established; see Plan for details.   Lianne Bardales is a 72 year old /White female who presents for evaluation and treatment of:       Past Medical History  Disease Name Date Onset Notes   COPD (chronic obstructive pulmonary disease) --  --    GERD (gastroesophageal reflux disease) --  --    Knee pain --  --    Osteoporosis --  --    Seasonal allergies --  --    Stomach ulcer --  --          Past Surgical History  Procedure Name Date Notes   Cholecstectomy --  --    Hysterectomy --  --    Lung Biopsy(s) --  --          Medication List  Name Date Started Instructions   Breo Ellipta 200-25 mcg/dose inhalation blister with device 12/26/2017 inhale 1 puff by inhalation route once daily at the same time each day for 30 days   Incruse Ellipta 62.5 mcg/actuation inhalation blister with device 01/26/2018 take 1 puff by inhalation route daily for 1 day   Nexium 40 mg oral capsule,delayed release(DR/EC) 12/26/2017 take 1 capsule (40 mg) by oral route once daily for 90 days         Allergy List  Allergen Name Date Reaction Notes   NO KNOWN DRUG ALLERGIES --  --  --          Family Medical History  Disease Name Relative/Age Notes   Alcohol Abuse / Father    Father/    DM Type II / Mother    Mother/    Hyperlipidemia / --     Son/    Mother, Grandmother, or Sister developed Heart Disease before the age of 65 / --          Social History  Finding Status Start/Stop Quantity Notes   Alcohol Current every day --/-- --  drinks alcohol, 7 or less drinks per week   Exercises regularly --  --/-- --  3-4 times per week   Recreational Drug Use Never --/-- --  never used   Second hand smoke exposure Unknown --/-- --  no   Tobacco Former --/-- 2 PPD former smoker, smokes 1 to 2 packs per day, for 20 years   Uses seatbelts --   "--/-- --  yes         Vitals  Date Time BP Position Site L\R Cuff Size HR RR TEMP(F) WT  HT  BMI kg/m2 BSA m2 O2 Sat HC       02/06/2018 08:30 /76 Sitting    103 - R  97.1 135lbs 0oz 5'  4.5\" 22.81 1.67 94 %               Assessment  · Encounter for Medicare annual wellness exam     V70.0/Z00.00  · Need for influenza vaccination     V04.81/Z23  · Vitamin D deficiency     268.9/E55.9      Plan  · Orders  o Falls Risk Assessment Completed (3288F) - V70.0/Z00.00 - 02/06/2018  o Brief hearing screening (written) [facility] () - V70.0/Z00.00 - 02/06/2018  o Annual wellness visit; includes a personalized prevention plan of service (pps), initial visit () - V70.0/Z00.00 - 02/06/2018  o ACO-13: Fall Risk Screening with no falls in past year or only one fall without injury in the past year (1101F) - V70.0/Z00.00 - 02/06/2018  o Presence or absence of urinary incontinence assessed (JUANITA) (1090F) - V70.0/Z00.00 - 02/06/2018  o Vitamin D Level (92576) - 268.9/E55.9 - 02/06/2018  o ACO-39: Current medications updated and reviewed () - - 02/06/2018  o ACO-20: Screening Mammography documented and reviewed (3014F) - - 02/06/2018  o Influenza immunization was ordered or administered () - V04.81/Z23 - 02/06/2018  o ACO-15: Pneumococcal Vaccine Administered or Previously Received (4040F) - - 02/06/2018  o Fluzone High Dose Flu Vaccine (97237) - V04.81/Z23 - 02/06/2018   Vaccine - Influenza; Dose: 0.5; Site: Left Arm; Route: Intramuscular; Date: 02/06/2018 09:45:00; Exp: 06/06/2018; Lot: [id number]; Mfg: [employer] pasteur; TradeName: Fluzone High-Dose; Administered By: Karli Patel MA; Comment: ndc [phone]  o Hepatitis B Administration - Medicare () - V70.0/Z00.00 - 02/06/2018  o Hepatitis C antibody MEDICARE screening [facility] () - V70.0/Z00.00 - 02/06/2018  · Instructions  o Health Risk Assessment has been reviewed with the patient.  o Written health screening schedule for next 5-10 years was established with " patient; information scanned in chart and given to patient.  o Fall prevention methods discussed and a copy of recommendations given to patient.  o Patient was educated/instructed on their diagnosis, treatment and medications prior to discharge from the clinic today.            Electronically Signed by: Andie Crowell MD -Author on February 6, 2018 10:31:11 AM

## 2021-05-07 NOTE — PROGRESS NOTES
Progress Note      Patient Name: Lianne Bardales   Patient ID: [id number]   Sex: Female   YOB: 1945        Visit Date: January 26, 2018    Provider: Andie Crowell MD   Location: [facility]   Location Address: [address] [place] [address]  [id number]   Location Phone: [phone]          Chief Complaint     Follow up on breathing       History Of Present Illness  Lianne Bardales is a 72 year old /White female who presents for evaluation and treatment of:      She couldn't tell any difference when she used the Incruse in place ot the Tudorza.  Her breathing has been the same.  We have looked at the last PFT and she had mild obstruction while she was on Tudorza and that is the same reading that she is getting today.  she is working on her diet .  She wants to lose 5 lbs before she goes to [facility]. She has already lost 5 lbs by cutting out bread and potatoes.  She is eating more fish and chicken.  She asks for a hand-out.  She likes a variety of foods but her  doesn't.         Past Medical History  Disease Name Date Onset Notes   COPD (chronic obstructive pulmonary disease) --  --    GERD (gastroesophageal reflux disease) --  --    Knee pain --  --    Osteoporosis --  --    Seasonal allergies --  --    Stomach ulcer --  --          Past Surgical History  Procedure Name Date Notes   Cholecstectomy --  --    Hysterectomy --  --    Lung Biopsy(s) --  --          Medication List  Name Date Started Instructions   Breo Ellipta 200-25 mcg/dose inhalation blister with device 12/26/2017 inhale 1 puff by inhalation route once daily at the same time each day for 30 days   Nexium 40 mg oral capsule,delayed release(DR/EC) 12/26/2017 take 1 capsule (40 mg) by oral route once daily for 90 days   Tudorza Pressair 400 mcg/actuation inhalation aerosol powdr breath activated  inhale 1 puff (400 mcg) by inhalation route every 12 hours         Allergy List  Allergen Name  Date Reaction Notes   NO KNOWN DRUG ALLERGIES --  --  --          Family Medical History  Disease Name Relative/Age Notes   Alcohol Abuse / Father    Father/    DM Type II / Mother    Mother/    Hyperlipidemia / --     Son/    Mother, Grandmother, or Sister developed Heart Disease before the age of 65 / --          Social History  Finding Status Start/Stop Quantity Notes   Alcohol Current every day --/-- --  drinks alcohol, 7 or less drinks per week   Exercises regularly --  --/-- --  3-4 times per week   Recreational Drug Use Never --/-- --  never used   Second hand smoke exposure Unknown --/-- --  no   Tobacco Former --/-- 2 PPD former smoker, smokes 1 to 2 packs per day, for 20 years   Uses seatbelts --  --/-- --  yes         Vitals  Date Time BP Position Site L\R Cuff Size HR RR TEMP(F) WT  HT  BMI kg/m2 BSA m2 O2 Sat HC       01/26/2018 09:05 /76 Sitting    102 - R  97.6 138lbs 0oz    96 %           Physical Examination  · Constitutional  o Appearance  o : well developed, well-nourished, in no acute distress  · Eyes  o Conjunctivae  o : conjunctivae normal  · Neck  o Inspection/Palpation  o : supple  o Thyroid  o : no thyromegaly  · Respiratory  o Respiratory Effort  o : breathing unlabored  o Auscultation of Lungs  o : clear to ascultation  · Cardiovascular  o Heart  o :   § Auscultation of Heart  § : regular rate and rhythm  o Peripheral Vascular System  o :   § Extremities  § : no edema  · Lymphatic  o Neck  o : no lymphadenopathy present  · Musculoskeletal  o General  o :   § General Musculoskeletal  § : Muscle tone, strength, and development grossly normal.  · Skin and Subcutaneous Tissue  o General Inspection  o : no lesions present, no areas of discoloration, skin turgor normal, texture normal  · Neurologic  o Gait and Station  o :   § Gait Screening  § : normal gait  · Psychiatric  o Mood and Affect  o : mood normal, affect appropriate          Plan  · Orders  o ACO-39: Current medications updated  and reviewed () - - 01/26/2018  · Medications  o Incruse Ellipta 62.5 mcg/actuation inhalation blister with device   SIG: take 1 puff by inhalation route daily for 1 day   DISP: (1) 7 ct blist pack with 5 refills  Prescribed on 01/26/2018     o Tudorza Pressair 400 mcg/actuation inhalation aerosol powdr breath activated   SIG: inhale 1 puff (400 mcg) by inhalation route every 12 hours   DISP: (1) Aer br.act with 0 refills  Discontinued on 01/26/2018     · Instructions  o Patient was educated/instructed on their diagnosis, treatment and medications prior to discharge from the clinic today.  o weight reduction diet given            Electronically Signed by: Andie Crowell MD -Author on January 26, 2018 05:50:04 PM

## 2021-05-09 VITALS
TEMPERATURE: 97.1 F | DIASTOLIC BLOOD PRESSURE: 76 MMHG | SYSTOLIC BLOOD PRESSURE: 126 MMHG | HEART RATE: 103 BPM | WEIGHT: 135 LBS | HEIGHT: 64 IN | OXYGEN SATURATION: 94 % | BODY MASS INDEX: 23.05 KG/M2

## 2021-05-09 VITALS
HEART RATE: 102 BPM | DIASTOLIC BLOOD PRESSURE: 76 MMHG | WEIGHT: 138 LBS | SYSTOLIC BLOOD PRESSURE: 130 MMHG | TEMPERATURE: 97.6 F | OXYGEN SATURATION: 96 %

## 2021-05-13 NOTE — PROGRESS NOTES
Progress Note      Patient Name: Lianne Bardales   Patient ID: [id number]   Sex: Female   YOB: 1945    Primary Care Provider: Pura VALDEZ    Visit Date: July 30, 2020    Provider: Sam Hampton MD   Location: [facility]   Location Address: [address] [place] [address], [facility] [address]  [place], [place]  [id number]   Location Phone: [phone]          Chief Complaint  · Annual Wellness Exam      History Of Present Illness  The patient is a 75 year old /White female who has come to this office for her Annual Wellness Visit.   Her Primary Care Provider is Sam Hampton MD. Her comprehensive Care Team list, including suppliers, has been updated on the Facesheet. Her medical/family history, height, weight, BMI, and blood pressure have been reviewed and are in the chart. The Health Risk Assessment has been completed and scanned in the chart.   Medications are listed in the medication list.   The active problem list includes: Anemia, iron deficiency, COPD (chronic obstructive pulmonary disease), Gall stones, GERD (gastroesophageal reflux disease), and Shortness of Breath   The patient does not have a history of substance use.   Patient reports her diet is adequate.   The Mini-Cog has been administered and is scanned in chart. The results are negative. Her cognitive function is without limitation.   A hearing loss screen was completed today and the result is negative.   Patient does not have any risk factors for depression. Patient completed the PHQ-9 today and it has been scanned in the chart. The total score is 1-4.   The Get Up and Go screen was administered today and the result is negative.   The [place] Index of [place] in ADLs indicated full function (score of 6).   A Falls Risk Assessment has been completed, including a review of home fall hazards and medication review.   Overall, the patient's functional ability is noted by this provider to be within normal limits. Her  level of safety is noted to be within normal limits. Her balance/gait is within normal limits. There have been no falls in the past year. Patient-specific home safety recommendations have been reviewed and a copy has been given to patient.   She denies issues with leaking urine.   There are no additional risk factors identified.   Living Will/Advanced Directive has not previously been completed.   Personalized health advice was given to the patient and a written health screening schedule was established; see Plan for details.             Past Medical History  Disease Name Date Onset Notes   Anemia, iron deficiency 04/18/2019 --    COPD (chronic obstructive pulmonary disease) --  --    Gall stones --  --    GERD (gastroesophageal reflux disease) --  --    Shortness of Breath --  --          Past Surgical History  Procedure Name Date Notes   *No Past Surgical History --  --          Medication List  Name Date Started Instructions   aspirin 81 mg oral tablet,delayed release (DR/EC)  take 1 tablet (81 mg) by oral route once daily   hydrochlorothiazide 25 mg oral tablet 05/06/2020 take 1 tablet (25 mg) by oral route once daily for 90 days   metoprolol succinate 50 mg oral tablet extended release 24 hr 05/06/2020 take 1 tablet (50 mg) by oral route once daily for 90 days   Nexium 40 mg oral capsule,delayed release(DR/EC) 05/13/2020 take 1 capsule (40 mg) by oral route once daily   nitroglycerin 0.4 mg sublingual tablet, sublingual  place 1 tablet (0.4 mg) by buccal route at the first sign of an attack; no more than 3 tabs are recommended within a 15 minute period.   rosuvastatin 40 mg oral tablet 04/17/2020 take 1 tablet (40 mg) by oral route once daily at bedtime for 30 days   Hermelinda Ellipta 100-62.5-25 mcg inhalation blister with device 03/09/2020 inhale 1 puff by inhalation route once daily at the same time each day   Ventolin HFA 90 mcg/actuation inhalation HFA aerosol inhaler 05/20/2019 inhale 1 puff (90 mcg) by  "inhalation route every 6 hours as needed   Vitamin D3 2,000 unit oral tablet  take 1 tablet by oral route daily         Allergy List  Allergen Name Date Reaction Notes   NO KNOWN DRUG ALLERGIES --  --  --          Family Medical History  Disease Name Relative/Age Notes   Stroke Mother/   --    Tuberculosis Father/   --          Social History  Finding Status Start/Stop Quantity Notes   Alcohol Current some day --/-- --  --    Tobacco Former --/-- --  quit 29 years ago          Immunizations  NameDate Admin Mfg Trade Name Lot Number Route Inj VIS Given VIS Publication   Hepatitis A07/17/2019 NE Not Entered  NE NE 12/19/2019    Comments:    Hepatitis A11/01/2018 NE Not Entered  NE NE 12/19/2019    Comments:    Qoxqdtwub65/07/2019 NE Not Entered  NE NE 12/19/2019    Comments:    Zuybtkgj31/07/2019 NE Not Entered  NE NE 12/19/2019    Comments:    Emwmkqyh09/01/2018 NE Not Entered  NE NE 12/19/2019    Comments:          Vitals  Date Time BP Position Site L\R Cuff Size HR RR TEMP (F) WT  HT  BMI kg/m2 BSA m2 O2 Sat HC       07/30/2020 09:21 /70 Sitting    70 - R  97.3 130lbs 0oz 5'  4\" 22.31 1.63 96 %              Assessment  · Encounter for Medicare annual wellness exam     V70.0/Z00.00  · Screening for depression     V79.0/Z13.89  · Screening for alcoholism     V79.1/Z13.39  · Advanced care planning/counseling discussion     V65.49/Z71.89  · Anemia, iron deficiency     280.9/D50.9  · COPD (chronic obstructive pulmonary disease)     496/J44.9  · GERD (gastroesophageal reflux disease)     530.81/K21.9  · Gall stones     574.20/K80.20  · Shortness of Breath     786.05/R06.02    Problems Reconciled  Plan  · Orders  o Falls Risk Assessment Completed (3288F) - V70.0/Z00.00 - 07/30/2020  o Brief hearing screening (written) [facility] () - V70.0/Z00.00 - 07/30/2020  o Annual Wellness Visit-includes a Personalized Prevention Plan of Service (PPS), SUBSEQUENT VISIT (Medicare) () - V70.0/Z00.00 - 07/30/2020  o ACO-13: " Fall Risk Screening with no falls in past year or only one fall without injury in the past year (1101F) - V70.0/Z00.00 - 07/30/2020  o Presence or absence of urinary incontinence assessed (JUANITA) (1090F) - V70.0/Z00.00 - 07/30/2020  o ACO-18: Negative screen for clinical depression using a standardized tool () - V79.0/Z13.89 - 07/30/2020  o Negative alcohol screening () - V79.1/Z13.39 - 07/30/2020  o ACO - Pt declines to or was not able to provide an Advance Care Plan or name a Surrogate Decision Maker (1124F) - V65.49/Z71.89 - 07/30/2020  o ACO-39: Current medications updated and reviewed () - - 07/30/2020  o ACO-19: Colorectal cancer screening results documented and reviewed (3017F) - - 07/30/2020   Pet scan 7/25/2020  o ACO-20: Screening Mammography documented and reviewed () - - 07/30/2020  o Influenza immunization was ordered or administered () - - 07/30/2020  o ACO-15: Pneumococcal Vaccine Administered or Previously Received (4040F) - - 07/30/2020  o ACO-16: BMI within normal range and no follow-up plan is required () - - 07/30/2020  · Medications  o Medications have been Reconciled  o Transition of Care or Provider Policy  · Instructions  o Health Risk Assessment has been reviewed with the patient.  o Written health screening schedule for next 5-10 years was established with patient; information scanned in chart and given/mailed to patient.  o Fall prevention methods discussed and a copy of recommendations given/mailed to patient.  o Today's PHQ-9 score is: __2_            Electronically Signed by: Sam Hampton MD -Author on July 30, 2020 10:31:11 AM

## 2021-05-13 NOTE — PROGRESS NOTES
"   Progress Note      Patient Name: Lianne Bardales   Patient ID: [id number]   Sex: Female   YOB: 1945    Primary Care Provider: Pura VALDEZ    Visit Date: July 30, 2020    Provider: Sam Hampton MD   Location: [facility]   Location Address: [address] [place] [address], [address]  [place], [place]  [id number]   Location Phone: [phone]          Chief Complaint  · Follow Up  · HTN  · \" muscle cramps \"      History Of Present Illness  Lianne Bardales is a 75 year old /White female who presents for evaluation and treatment of:      HTN- pt denies HAs, dizziness, CP  HLD- doing well on statin  lumbago - pt takes NSAIDs as needed.   anxiety- pt reports doing well now she is not worrying about PET scan.   GERD- does well with Nexium as needed.   COPD- pt reports dad passed from TB. pt had PET scan, which was normal for metabolic activity. concerning nodule in right lung had shrunk. pt reports h/o +PPD test.   insomnia- pt stopped taking trazadone because it was not helping. pt reports sleeping is better.       Past Medical History  Disease Name Date Onset Notes   Anemia, iron deficiency 04/18/2019 --    COPD (chronic obstructive pulmonary disease) --  --    Gall stones --  --    GERD (gastroesophageal reflux disease) --  --    Shortness of Breath --  --          Past Surgical History  Procedure Name Date Notes   *No Past Surgical History --  --          Medication List  Name Date Started Instructions   aspirin 81 mg oral tablet,delayed release (DR/EC)  take 1 tablet (81 mg) by oral route once daily   hydrochlorothiazide 25 mg oral tablet 05/06/2020 take 1 tablet (25 mg) by oral route once daily for 90 days   metoprolol succinate 50 mg oral tablet extended release 24 hr 05/06/2020 take 1 tablet (50 mg) by oral route once daily for 90 days   Nexium 40 mg oral capsule,delayed release(DR/EC) 05/13/2020 take 1 capsule (40 mg) by oral route once daily   nitroglycerin 0.4 mg " sublingual tablet, sublingual  place 1 tablet (0.4 mg) by buccal route at the first sign of an attack; no more than 3 tabs are recommended within a 15 minute period.   rosuvastatin 40 mg oral tablet 04/17/2020 take 1 tablet (40 mg) by oral route once daily at bedtime for 30 days   Hermelinda Ellipta 100-62.5-25 mcg inhalation blister with device 03/09/2020 inhale 1 puff by inhalation route once daily at the same time each day   Ventolin HFA 90 mcg/actuation inhalation HFA aerosol inhaler 05/20/2019 inhale 1 puff (90 mcg) by inhalation route every 6 hours as needed   Vitamin D3 2,000 unit oral tablet  take 1 tablet by oral route daily         Allergy List  Allergen Name Date Reaction Notes   NO KNOWN DRUG ALLERGIES --  --  --          Family Medical History  Disease Name Relative/Age Notes   Stroke Mother/   --    Tuberculosis Father/   --          Social History  Finding Status Start/Stop Quantity Notes   Alcohol Current some day --/-- --  --    Tobacco Former --/-- --  quit 29 years ago          Immunizations  NameDate Admin Mfg Trade Name Lot Number Route Inj VIS Given VIS Publication   Hepatitis A07/17/2019 NE Not Entered  NE NE 12/19/2019    Comments:    Hepatitis A11/01/2018 NE Not Entered  NE NE 12/19/2019    Comments:    Sukodsuqd79/07/2019 NE Not Entered  NE NE 12/19/2019    Comments:    Qfurfdwb40/07/2019 NE Not Entered  NE NE 12/19/2019    Comments:    Nyuppqfp01/01/2018 NE Not Entered  NE NE 12/19/2019    Comments:          Review of Systems  · Constitutional  o Denies  o : fever, fatigue, weight loss, weight gain  · Cardiovascular  o Denies  o : lower extremity edema, chest pressure, palpitations  · Respiratory  o Denies  o : shortness of breath, wheezing, frequent cough, dyspnea on exertion  · Gastrointestinal  o Denies  o : nausea, vomiting, diarrhea, constipation, abdominal pain  · Psychiatric  o Denies  o : anxiety, depression      Vitals  Date Time BP Position Site L\R Cuff Size HR RR TEMP (F) WT  HT   "BMI kg/m2 BSA m2 O2 Sat HC       12/04/2019 10:04 /94 Sitting    68 - R  98.7 133lbs 0oz 5'  4\" 22.83 1.65 94 %    01/23/2020 11:28 /64 Sitting    55 - R  98.2 133lbs 2oz 5'  4\" 22.85 1.65 93 %    07/30/2020 09:21 /70 Sitting    70 - R  97.3 130lbs 0oz 5'  4\" 22.31 1.63 96 %          Physical Examination  · Constitutional  o Appearance  o : no acute distress, well-nourished  · Head and Face  o Head  o :   § Inspection  § : atraumatic, normocephalic  · Eyes  o Eyes  o : extraocular movements intact, no scleral icterus, no conjunctival injection  · Ears, Nose, Mouth and Throat  o Ears  o :   § External Ears  § : normal  o Nose  o :   § Intranasal Exam  § : nares patent  o Oral Cavity  o :   § Oral Mucosa  § : moist mucous membranes  · Respiratory  o Respiratory Effort  o : breathing comfortably, symmetric chest rise  o Auscultation of Lungs  o : clear to asculatation bilaterally, no wheezes, rales, or rhonchii  · Cardiovascular  o Heart  o :   § Auscultation of Heart  § : regular rate and rhythm, no murmurs, rubs, or gallops  o Peripheral Vascular System  o :   § Extremities  § : no edema  · Neurologic  o Mental Status Examination  o :   § Orientation  § : grossly oriented to person, place and time  o Gait and Station  o :   § Gait Screening  § : normal gait  · Psychiatric  o General  o : normal mood and affect          Assessment  · Need for hepatitis C screening test     V73.89/Z11.59  · Screening for HIV (human immunodeficiency virus)     V73.89/Z11.4  · Anxiety disorder     300.00/F41.9  improved. cont off meds.   · COPD (chronic obstructive pulmonary disease)     496/J44.9  at baseline. cont f/u with pulm. recent chest CT and PET scan reviewed and overall reassuring. concern for possible TB given +quantiferon and chest CT findings. will f/u with pulm.   · Essential hypertension     401.9/I10  well controlled  · GERD (gastroesophageal reflux disease)     530.81/K21.9  doing well with Nexium as " needed  · Hyperlipidemia     272.4/E78.5  check labs. cont statin  · Lumbago     724.2/M54.5  cont NSAIDs as needed  · Positive QuantiFERON-TB Gold test     795.52/R76.12  will /fu with pulm regarding the result.     Problems Reconciled  Plan  · Orders  o Hepatitis C antibody MEDICARE screening [facility] (69298, ) - V73.89/Z11.59 - 07/30/2020  o HIV Screening MEDICARE [facility] (18422, ) - V73.89/Z11.4 - 07/30/2020  o Physical, Primary Care Panel (CBC, CMP, Lipid, TSH) [facility] (60217, 75542, 15247, [address]197) - 272.4/E78.5 - 07/30/2020  o ACO-39: Current medications updated and reviewed () - - 07/30/2020  o ACO-20: Screening Mammography documented and reviewed () - - 07/30/2020  o ACO-19: Colorectal cancer screening results documented and reviewed (3017F) - - 07/30/2020  · Medications  o Medications have been Reconciled  o Transition of Care or Provider Policy  · Instructions  o Medicare suggests a once in a lifetime screening for Hepatitis C for all Medicare beneficiaries born between 5683-2000.  o CDC recommends that everyone between 13 and 64 get tested for HIV at least once as part of routine healthcare.  o Patient was educated/instructed on their diagnosis, treatment and medications prior to discharge from the clinic today.  o 25 Minutes spent with patient including greater than 50% in Education/Counseling/Care Coordination.  · Disposition  o f/u in 6 months  o labs done in clinic            Electronically Signed by: Sam Hampton MD -Author on July 30, 2020 10:36:17 AM

## 2021-05-14 VITALS
BODY MASS INDEX: 23.99 KG/M2 | HEIGHT: 64 IN | WEIGHT: 140.5 LBS | SYSTOLIC BLOOD PRESSURE: 152 MMHG | DIASTOLIC BLOOD PRESSURE: 92 MMHG | OXYGEN SATURATION: 95 % | HEART RATE: 72 BPM | TEMPERATURE: 97.2 F

## 2021-05-14 NOTE — PROGRESS NOTES
"   Progress Note      Patient Name: Lianne Bardales   Patient ID: [id number]   Sex: Female   YOB: 1945    Primary Care Provider: Pura VALDEZ    Visit Date: February 2, 2021    Provider: Sam Hampton MD   Location: [facility]   Location Address: [address] [place] [address], [facility] [address]  [place], [place]  [id number]   Location Phone: [phone]          Chief Complaint  · Follow Up HTN  · \"would like a script for diclofenac sodium \"      History Of Present Illness  Lianne Bardales is a 75 year old /White female who presents for evaluation and treatment of:      HTN- pt reports doing well with HCTZ and metoprolol. pt without BP machine at home. pt denies HAs, dizziness, CP.   HLD- doing well on statin.   OA- pt reports exacebration of knee pain recently that was helped with voltaren and would like script.   COPD- pt reports overall doing well with trelegy. pt reports intermittent nausea after taking inhalers.   due for mammo and DEXA.       Allergy List    Allergies Reconciled  Vitals  Date Time BP Position Site L\R Cuff Size HR RR TEMP (F) WT  HT  BMI kg/m2 BSA m2 O2 Sat FR L/min FiO2 HC       01/23/2020 11:28 /64 Sitting    55 - R  98.2 133lbs 2oz 5'  4\" 22.85 1.65 93 %  21%    07/30/2020 09:21 /70 Sitting    70 - R  97.3 130lbs 0oz 5'  4\" 22.31 1.63 96 %      02/02/2021 10:17 /92 Sitting    72 - R  97.2 140lbs 8oz 5'  4\" 24.12 1.7 95 %            Physical Examination  · Constitutional  o Appearance  o : no acute distress, well-nourished  · Head and Face  o Head  o :   § Inspection  § : atraumatic, normocephalic  · Eyes  o Eyes  o : extraocular movements intact, no scleral icterus, no conjunctival injection  · Ears, Nose, Mouth and Throat  o Ears  o :   § External Ears  § : normal  o Nose  o :   § Intranasal Exam  § : nares patent  o Oral Cavity  o :   § Oral Mucosa  § : moist mucous membranes  · Respiratory  o Respiratory Effort  o : breathing " comfortably, symmetric chest rise  o Auscultation of Lungs  o : clear to asculatation bilaterally, no wheezes, rales, or rhonchii  · Cardiovascular  o Heart  o :   § Auscultation of Heart  § : regular rate and rhythm, no murmurs, rubs, or gallops  o Peripheral Vascular System  o :   § Extremities  § : no edema  · Neurologic  o Mental Status Examination  o :   § Orientation  § : grossly oriented to person, place and time  o Gait and Station  o :   § Gait Screening  § : normal gait  · Psychiatric  o General  o : normal mood and affect          Assessment  · COPD (chronic obstructive pulmonary disease)     496/J44.9  doing well overall. cont f/u with pulm.   · GERD (gastroesophageal reflux disease)     530.81/K21.9  avoiding NSAIDs if possible discussed.   · Post menopausal syndrome     V49.81/Z78.0  · Visit for screening mammogram     V76.12/Z12.31  · Anemia     285.9/D64.9  check labs.   · Essential hypertension     401.9/I10  elevated today. encouraged home monitoring. goal BP <130/80.  · Hyperlipidemia     272.4/E78.5  check labs. doing well on statin.   · Osteoarthritis     715.90/M19.90  will Rx volatren gel and monitor for effectiveness.     Problems Reconciled  Plan  · Orders  o DEXA Bone Density, 1 or more sites, axial skeleton [facility] (56178) - V49.81/Z78.0 - 02/02/2021  o Screening Mammography; Bilateral 2D (68257, ) - V76.12/Z12.31 - 02/02/2021  o Iron panel (iron, TIBC, transferrin saturation) (11434, 86057, 59100) - 285.9/D64.9 - 02/02/2021  o Ferritin ser/plas (97534) - 285.9/D64.9 - 02/02/2021  o Physical, Primary Care Panel (CBC, CMP, Lipid, TSH) [facility] (48206, 61941, 34561, 04305) - 496/J44.9, 285.9/D64.9, 401.9/I10 - 02/02/2021  o ACO-14: Influenza immunization administered or previously received [facility] () - - 02/02/2021  o ACO-39: Current medications updated and reviewed (1159F, ) - - 02/02/2021  · Medications  o Medications have been Reconciled  o Transition of Care or Provider  Policy  · Instructions  o Patient was educated/instructed on their diagnosis, treatment and medications prior to discharge from the clinic today.  · Disposition  o f/u in 6 months  o labs done in clinic            Electronically Signed by: Sam Hampton MD -Author on February 2, 2021 09:52:18 PM

## 2021-05-15 VITALS
HEART RATE: 70 BPM | OXYGEN SATURATION: 96 % | HEIGHT: 64 IN | SYSTOLIC BLOOD PRESSURE: 130 MMHG | BODY MASS INDEX: 22.2 KG/M2 | DIASTOLIC BLOOD PRESSURE: 70 MMHG | WEIGHT: 130 LBS | TEMPERATURE: 97.3 F

## 2021-05-15 VITALS
HEIGHT: 64 IN | DIASTOLIC BLOOD PRESSURE: 64 MMHG | WEIGHT: 133.12 LBS | OXYGEN SATURATION: 93 % | HEART RATE: 55 BPM | SYSTOLIC BLOOD PRESSURE: 130 MMHG | TEMPERATURE: 98.2 F | BODY MASS INDEX: 22.73 KG/M2

## 2021-05-15 VITALS
SYSTOLIC BLOOD PRESSURE: 162 MMHG | DIASTOLIC BLOOD PRESSURE: 94 MMHG | HEIGHT: 64 IN | BODY MASS INDEX: 22.71 KG/M2 | HEART RATE: 68 BPM | WEIGHT: 133 LBS | TEMPERATURE: 98.7 F | OXYGEN SATURATION: 94 %

## 2021-05-15 VITALS
BODY MASS INDEX: 22.26 KG/M2 | RESPIRATION RATE: 16 BRPM | OXYGEN SATURATION: 94 % | TEMPERATURE: 98 F | WEIGHT: 130.37 LBS | DIASTOLIC BLOOD PRESSURE: 70 MMHG | HEIGHT: 64 IN | HEART RATE: 72 BPM | SYSTOLIC BLOOD PRESSURE: 136 MMHG

## 2021-05-15 VITALS
WEIGHT: 133.25 LBS | RESPIRATION RATE: 16 BRPM | HEART RATE: 62 BPM | HEIGHT: 64 IN | TEMPERATURE: 98.1 F | DIASTOLIC BLOOD PRESSURE: 68 MMHG | SYSTOLIC BLOOD PRESSURE: 132 MMHG | OXYGEN SATURATION: 95 % | BODY MASS INDEX: 22.75 KG/M2

## 2021-05-15 VITALS
HEART RATE: 66 BPM | WEIGHT: 131.37 LBS | RESPIRATION RATE: 16 BRPM | OXYGEN SATURATION: 95 % | SYSTOLIC BLOOD PRESSURE: 144 MMHG | HEIGHT: 64 IN | BODY MASS INDEX: 22.43 KG/M2 | DIASTOLIC BLOOD PRESSURE: 84 MMHG | TEMPERATURE: 98 F

## 2021-05-16 VITALS
HEIGHT: 64 IN | TEMPERATURE: 98.7 F | OXYGEN SATURATION: 92 % | SYSTOLIC BLOOD PRESSURE: 146 MMHG | DIASTOLIC BLOOD PRESSURE: 86 MMHG | HEART RATE: 73 BPM | WEIGHT: 129.25 LBS | RESPIRATION RATE: 18 BRPM | BODY MASS INDEX: 22.07 KG/M2

## 2021-05-28 VITALS
WEIGHT: 128 LBS | OXYGEN SATURATION: 91 % | TEMPERATURE: 98.2 F | OXYGEN SATURATION: 95 % | DIASTOLIC BLOOD PRESSURE: 70 MMHG | SYSTOLIC BLOOD PRESSURE: 140 MMHG | DIASTOLIC BLOOD PRESSURE: 76 MMHG | BODY MASS INDEX: 21.85 KG/M2 | RESPIRATION RATE: 16 BRPM | WEIGHT: 128 LBS | HEIGHT: 64 IN | HEART RATE: 60 BPM | HEART RATE: 70 BPM | BODY MASS INDEX: 22.88 KG/M2 | HEART RATE: 89 BPM | RESPIRATION RATE: 16 BRPM | SYSTOLIC BLOOD PRESSURE: 140 MMHG | SYSTOLIC BLOOD PRESSURE: 110 MMHG | TEMPERATURE: 98 F | RESPIRATION RATE: 16 BRPM | HEIGHT: 64 IN | TEMPERATURE: 98.4 F | DIASTOLIC BLOOD PRESSURE: 72 MMHG | WEIGHT: 134 LBS | OXYGEN SATURATION: 92 % | HEIGHT: 64 IN | BODY MASS INDEX: 21.85 KG/M2

## 2021-05-28 NOTE — PROGRESS NOTES
Patient: JOSR BUTTERFIELD     Acct: [id number]     Report: #[id number]  UNIT #: [id number]     : 1945    Encounter Date:2020  PRIMARY CARE: DARLENE ENG  ***Signed***  --------------------------------------------------------------------------------------------------------------------  Chief Complaint      Encounter Date      Aug 12, 2020            Primary Care Provider      DARLENE ENG            Referring Provider      SELF,REFERRED            Patient Complaint      Patient is complaining of      1 month follow up/soa            VITALS      Height 5 ft 4 in / 162.56 cm      Weight 128 lbs 0 oz / 58.417334 kg      BSA 1.62 m2      BMI 22.0 kg/m2      Temperature 98.2 F / 36.78 C - Tympanic      Pulse 89      Respirations 16      Blood Pressure 110/72 Sitting, Right Arm      Pulse Oximetry 91%, room air            HPI      The patient is a 75 year old female former cigarettes smoker, she smoked 2 pack     per day for about 30 years and quit over 25 years ago. She was diagnosed with     chronic obstructive pulmonary disease by her family physician. She has a history    of fungal pneumonia requiring wedge resection. The patient states her father      of tuberculosis and she cannot remember if she was ever on treatment for     latent tuberculosis. Her daughter is with her today and states she remembered     her taking antibiotics for a long time but they do not have a record of     completing treatment. The patient last saw me in the office in July for routine     follow up and at this time a CT scan of the chest was performed for her history     of smoking. It showed interval development of irregularly shaped nodules in the     right middle lobe measuring 1.1 X 0.9 cm. She had faint other lung nodules noted    in the left lung. She underwent fungal serologies, vasculitic work up and     quantiferon testing. All that came back negative with the exception of her     quantiferon  tuberculosis gold which was positive. A PET scan was performed     secondary to her history of smoking and these concerning nodules and the size of    them. The PET scan showed a decreasing nodularity and no PET avidity. She is     back today to review that PET scan and states she is in good health. She denies     chronic night sweats, fever or chills, weight loss, wheezing, coughing or chest     pain or chest pressure.             Past medical, family, social and surgical history reviewed and I agree with that    as documented in the medical chart.            Medications were reviewed and updated in the chart.  She is taking trelegy     ellipta inhaler once daily and finds it is working quite well for her.      Copies To:   DARLENE ENG      Constitutional:  Complains of: Fatigue; Denies: Fever, Weight gain, Weight loss,    Chills, Insomnia, Other      Respiratory/Breathing:  Denies: Shortness of air, Wheezing, Cough, Hemoptysis,     Pleuritic pain, Other      Endocrine:  Denies: Polydipsia, Polyuria, Heat/cold intolerance, Abnorml     menstrual pattern, Diabetes, Other      Eyes:  Denies: Blurred vision, Vision Changes, Other      Ears, nose, mouth, throat:  Denies: Mouth lesions, Thrush, Throat pain,     Hoarseness, Allergies/Hay Fever, Post Nasal Drip, Headaches, Recent Head Injury,    Nose Bleeding, Neck Stiffness, Thyroid Mass, Hearing Loss, Ear Fullness, Dry     Mouth, Nasal or Sinus Pain, Dry Lips, Nasal discharge, Nasal congestion, Other      Cardiovascular:  Denies: Palpitations, Syncope, Claudication, Chest Pain, Wake     up Gasping for air, Leg Swelling, Irregular Heart Rate, Cyanosis, Dyspnea on     Exertion, Other      Gastrointestinal:  Denies: Nausea, Constipation, Diarrhea, Abdominal pain,     Vomiting, Difficulty Swallowing, Reflux/Heartburn, Dysphagia, Jaundice,     Bloating, Melena, Bloody stools, Other      Genitourinary:  Denies: Urinary frequency, Incontinence, Hematuria,  Urgency,     Nocturia, Dysuria, Testicular problems, Other      Musculoskeletal:  Denies: Joint Pain, Joint Stiffness, Joint Swelling, Myalgias,    Other      Hematologic/lymphatic:  DENIES: Lymphadenopathy, Bruising, Bleeding tendencies,     Other      Neurological:  Denies: Headache, Numbness, Weakness, Seizures, Other      Psychiatric:  Denies: Anxiety, Appropriate Effect, Depression, Other      Integumentary:  Denies: Rash, Dry skin, Skin Warm to Touch, Other      Immunologic/Allergic:  Denies: Latex allergy, Seasonal allergies, Asthma,     Urticaria, Eczema, Other            FAMILY/SOCIAL/MEDICAL HX      Surgical History:  Yes: Bowel Surgery (COLONOSCOPY), Cholecystectomy (1980'S),     Orthopedic Surgery (R TRIGGER THUMB RELEASE, pin in left ankle), Other Surgeries    (part of lung); No: AAA Repair, Abdominal Surgery, Adenoids, Angioplasty, A    ppendectomy, Back Surgery, Bladder Surgery, Breast Surgery, CABG, Carotid     Stenosis, Ear Surgery, Eye Surgery, Head Surgery, Hernia Surgery, Kidney     Surgery, Nose Surgery, Oral Surgery, Prostatectomy, Rectal Surgery, Spinal     Surgery, Testicular Surgery, Throat Surgery, Tonsils, Valve Replacement,     Vascular Surgery      Heart - Family Hx:  Mother      Diabetes - Family Hx:  Mother      Cancer/Type - Family Hx:  Brother (lung cancer)      Other Family Medical History:  Mother, Father (TB)      Is Father Still Living?:  No      Is Mother Still Living?:  No       Family History:  Yes      Social History:  No Tobacco Use, No Alcohol Use, No Recreational Drug use      Smoking status:  Former smoker (2 ppd x 30 years, quit 1994)      Hysterectomy:  Yes      Anticoagulation Therapy:  No      Antibiotic Prophylaxis:  No      Medical History:  Yes: Arthritis (HANDS; OSTEO), Chemotherapy/Cancer (SKIN CA     REMOVED FROM NOSE), Chronic Bronchitis/COPD, High Blood Pressure, High     Cholesterol, Reflux Disease; No: Alcoholism, Allergies, Anemia, Asthma, Atrial      Fibrillation, Blood Disease, Broken Bones, Cataracts, Chemical Dependency,     Emphysema, Chronic Liver Disease, Colon Trouble, Colitis, Diverticulitis,     Congestive Heart Failu, Deafness or Ringing Ears, Convulsions, Depression,     Anxiety, Bipolar Disorder, PTSD, Diabetes, Epilepsy, Seizures, Forgetfullness,     Glaucoma, Gall Stones, Gout, Head Injury, Heart Attack, Heart Murmur, GERD,     Hemorrhoids/Rectal Prob, Hepatitis, Hiatal Hernia, HIV (Do not ask - volu,     Jaundice, Kidney or Bladder Disease, Kidney Stones, Migrane Headaches, Mitral     Valve Prolapse, Night sweats, Phlebitis, Psychiatric Care, Rheumatic Fever,     Sexually Transmitted Dis, Shortness Of Breath, Sinus Trouble, Skin     Disease/Psoriais/Ecz, Stroke, Thyroid Problem, Tuberculosis or Pos TB Te,     Miscellaneous Medical/oth      Psychiatric History      none            PREVENTION      Hx Influenza Vaccination:  Yes      Date Influenza Vaccine Given:  Nov 1, 2019      Influenza Vaccine Declined:  No      2 or More Falls in Past Year?:  No      Fall Past Year with Injury?:  No      Hx Pneumococcal Vaccination:  Yes      Encouraged to follow-up with:  PCP regarding preventative exams.      Chart initiated by      nikia valenzuela ma            ALLERGIES/MEDICATIONS      Allergies:        Coded Allergies:             NO KNOWN ALLERGIES (Unverified , 8/12/20)      Medications    Last Reconciled on 8/12/20 10:19 by DANI ROGERS,       Cholecalciferol (Vitamin D3*) 2,000 Unit Tablet      2000 UNITS PO QDAY, #30 TAB         Reported         4/15/20       Hctz (hydroCHLOROthiazide) 25 Mg Tablet      25 MG PO BID, #60 TAB 0 Refills         Reported         4/15/20       Metoprolol Succinate (Metoprolol Succinate) 50 Mg Tab.er.24h      50 MG PO QDAY, #30 TAB.SR.24H 0 Refills         Reported         1/22/20       Folic Acid/Vit Bcomp,C (Super B-Complex Folic-Vit C Tb) 400 Mcg Tablet      1 TAB PO QDAY, TAB         Reported         1/22/20        Fluticasone/Umeclidin/Vilanter (Trelegy Ellipta 100-62.5-25) 1 Each Blst.w.dev      1 PUFF INH RTQDAY, #1 INH         Reported         1/22/20       Aspirin EC (Aspirin EC) 81 Mg Tablet.      81 MG PO QDAY, #30 TAB.SR 0 Refills         Reported         1/22/20      Current Medications      Current Medications Reviewed 8/12/20            EXAM      VITAL SIGNS:  Reviewed.        GENERAL:  Pulse oximetry is only 91% today on room air but she appears in no     acute distress and breathing comfortably.      NECK:  Supple without tracheal deviation or lymphadenopathy.  No thyromegaly     appreciated.      LYMPHATICS:  No cervical or supraclavicular lymphadenopathy.      HEENT: Pupils are equal, round and reactive to light. There is no scleral     icterus.  Nares patent without hypertrophy of the turbinates. No erythema of the    passages.  TMs are clear bilaterally with good cone of light. The posterior     pharynx is without  lesions or erythema.      RESPIRATORY:  Clear to auscultation bilaterally.  No wheezes, rales or rhonchi.     Tympanic to percussion.        CARDIOVASCULAR:  Regular rate and rhythm.  No murmurs, gallops or rubs.  No     lower extremity edema.  Equal radial pulses.        GI: Soft, nontender, nondistended, no organomegaly.  Bowel sounds present in all    four quadrants.      MUSCULOSKELETAL:  No joint effusions, erythema or warmth over the major joint     systems.      SKIN:  No rashes or lesions.      NEUROLOGIC: Cranial nerves II-XII are intact bilaterally.  Moves all     extremities. Ambulates with ease.      PSYCH:  Appropriate mood and affect.      Vtials      Vitals:             Height 5 ft 4 in / 162.56 cm           Weight 128 lbs 0 oz / 58.893146 kg           BSA 1.62 m2           BMI 22.0 kg/m2           Temperature 98.2 F / 36.78 C - Tympanic           Pulse 89           Respirations 16           Blood Pressure 110/72 Sitting, Right Arm           Pulse Oximetry 91%, room air             REVIEW      Results Reviewed      [facility]  Yes Prev Lab Results, Yes Prev Radiology Results, Yes     Previous Mecial Records      Lab Results      I personally reviewed labs showing negative ANCA, normal 1-3 beta glucan. She     had negative HIV testing. Her IgE was mildly elevated at 92.      Radiographic Results      I personally reviewed CT scan of the chest from 09/15/2020.                     [facility]                PACS RADIOLOGY REPORT            Patient: JOSR BUTTERFIELD   Acct: #[id number]   Report: #[id number]            UNIT #: [id number]    DOS: 20 0932      INSURANCE:[employer]   ORDER #:[id number]      LOCATION:PET     : 1945            PROVIDERS      ADMITTING:     ATTENDING: DANI ROGERS      FAMILY:  DARLENE ENG   ORDERING:  DANI ROGERS         OTHER:    DICTATING:  Raj Webb MD            REQ #:[id number]   EXAM:[id number] - SKULL BASE-MIDTHIGH INIT fdg      REASON FOR EXAM:  R91.1      REASON FOR VISIT:  R91.1            *******Signed******         PROCEDURE:   PET CT SKULL BASE TO MID THIGH INITIAL             COMPARISON:   [facility], CT, CHEST W/O CONTRAST, 6/15/2020,     10:37.             INDICATIONS:   pulmonary nodule             TECHNIQUE:   After obtaining the patient's consent, F-18 FDG was administered     intravenously.  A PET       scan with concurrent CT imaging was performed from the skull to the thigh with     multiplanar imaging.             RADIONUCLIDE:     12.27 MCI   F18 FDG- I.V.      LABS:                          Blood Glucose 98 mg/dl      UPTAKE TIME:        58 mins      BACKGROUND UPTAKE: SUV 1.94              FINDINGS:         HEAD/NECK:   Visualized intracranial contents and globes and orbits appear     within normal limits.        There is no cervical lymphadenopathy.  No abnormal hypermetabolic activity seen      within the neck.        THORAX:   No mediastinal, hilar, or axillary adenopathy identified.  There are     calcified right hilar       lymph nodes.  There postoperative changes within the right upper lobe from     partial pneumonectomy.        Previously demonstrated noncalcified nodule within the right middle lobe has     decreased in size.        There is no associated hypermetabolic activity.  This would be compatible with a    n infectious or       inflammatory process.  Small area of parenchymal scarring is again noted within     the posterior right       lung apex unchanged from prior study.  No hypermetabolic activity seen elsewhere    within the chest.      ABDOMEN:   The unenhanced solid abdominal organs are within normal limits.      Bilateral adrenal glands       appear normal.  No abnormal hypermetabolic activity seen within the abdomen.  No    abdominal or       retroperitoneal adenopathy.  GI tract appears within normal limits      PELVIS:   GI tract appears within normal limits.  There is no pelvic or inguinal    adenopathy.  There       is a diverticulum arising from the posterior right side of the urinary bladder.     No abnormal       hypermetabolic activity seen within the pelvis.      BONES:   No abnormal hypermetabolic activity seen within the bony structures.      There is an old healed       fracture of the right inferior pubic ramus.  There are degenerative changes of     the lower lumbar       spine.  No lytic or sclerotic bony lesion identified.             CONCLUSION:         1. No abnormal hypermetabolic activity seen within the neck, chest, abdomen, or     pelvis.      2. Previously demonstrated right middle lobe nodule has decreased in size fav    oring an infectious or       inflammatory process.  No associated abnormal hypermetabolic activity.      3. Stable postoperative change of the right upper lobe              MD MARIELLE FIGUEROA            Electronically  Signed and Approved By: SHERRI PALOMARES MD on 2020 at 12:14                                  Until signed, this is an unconfirmed preliminary report that may contain      errors and is subject to change.                                              STEBA:      D:20 1304      PFT Results      I personally reviewed pulmonary function test from 2020 showing mild     obstructive defect, normal lung volumes but severely reduced diffusion capacity     of 37% predicted.            Assessment      Family history of primary tuberculosis - Z83.6            Lung nodule, multiple - R91.8            Notes      New Diagnostics      * [facility] Pre-Op Covid Screening, As Soon As Possible         Dx: Family history of primary tuberculosis - Z83.6      New Office Procedures      * Schedule Bronchoscopy, Week         Dx: Family history of primary tuberculosis - Z83.6      ASSESSMENT:      1. Right middle lobe lung nodule decreasing in size felt to be infectious in     nature.        2. Family history with first degree relative of active tuberculosis in father     who  from tuberculosis.       3. Concern for active versus latent tuberculosis.         4. History of fungal pneumonia requiring wedge resection of the lung.       5. Mild chronic obstructive pulmonary disease with severely reduced diffusion     capacity noted on pulmonary function test.        6. Chronic dyspnea improved.       7. Former heavy smoker in remission.             PLAN:      1. Risks and benefits of bronchoscopy with bronchalveolar lavage were discussed     in detail. The patient understands the risks including pneumothorax, pneumonia,     respiratory depression, bleeding and that it could be fatal as well. The patient    is agreeable for the procedure. Alternatives and options were also discussed.     The patient is needs to be NPO for the procedure.       2. Even though these nodules are decreasing in size, I do need to do a     bronchoscopy with  bronchalveolar lavage for acid fast bacilli and fungal stains     given her significant exposures to her dad who had active tuberculosis and also     having had a fungal pneumonia in the past requiring wedge resection. The patient    needs to know what is going on inside her lungs and this is the best way to     figure it out.       3. If she does not have active tuberculosis, I will start her on isoniazid for 9    months with vitamin B6 supplementation.       4. She is doing well on trelegy ellipta inhaler once daily, continue this for     now.       5. She will need a once yearly low dose CT scan of the chest which can be     ordered at follow up in 1 month after bronchoscopy.       6. She will need preoperative COVID-19 testing prior to the procedure which was     explained to her today.       7. Follow up in [place] in 1 month to go over the results of her     bronchoscopy.            Patient Education      Patient Education Provided:  Bronchoscopy      Time Spent:  > 50% /[facility]      Other Education:  active vs latent TB            Procedure Orders      Get Consent signed for:        Bronchoscopy with bronchalveolar lavage.      Risks and Benefits:        Risks and benefits were discussed in detail. The patient understands the      risks including pneumothorax, pneumonia, respiratory depression, bleeding      and that it could be fatal as well. The patient is agreeable for the      procedure. Alternatives and options were also discussed. The patient is      needs to be NPO for the procedure.            Electronically signed by DANI ROGERS  08/13/2020 13:27       Disclaimer: Converted document may not contain table formatting or lab diagrams. Please see [employer] for the authenticated document.

## 2021-05-28 NOTE — PROGRESS NOTES
"Patient: JOSR BUTTERFIELD     Acct: [id number]     Report: #[id number]  UNIT #: [id number]     : 1945    Encounter Date:2020  PRIMARY CARE: DARLENE ENG  ***Signed***  --------------------------------------------------------------------------------------------------------------------  History of Present Illness            Chief Complaint: Med reaction, COPD, Solitary Pulmonary Nodule            Josr Butterfield is presenting for evaluation via Telehealth visit. Verbal     consent obtained before beginning visit.            PAST MEDICAL HISTORY/OVERVIEW OF PATIENT SYMPTOMS            Date of Onset: - Med reaction            Symptoms: No complaints of SOA, Wheezing, Cough            Any known Exposure to COVID-19: NO             Provider spent (12) minutes with the patient during telehealth visit.            The following staff were present during this visit: Mena Flowers MA, Janina Lopez PA-C            The patient is a 75 year old white female patient of Dr. Hernadez last seen by her     in the office on 2020.  The patient had undergone a bronchoscopy and grew     a mold type fungus on bronch. She has apparently had a history of fungal     pneumonia requiring wedge resection and her father had  of tuberculosis in     the past.  The patient could not remember if she had ever been on treatment for     latent TB or not.  She had irregularly shaped nodules in her right middle lobe     measuring 1.1 x 0.9 cm.  She did have a PET scan done showing decreasing     nodularity and no PET avidity.  She underwent bronchoscopy for further     evaluation as mentioned and then was started on voriconazole after growing mold     type fungus. The patient reports she was taking the loading dose for two days an    d she began having what she describes as \"hallucinations\".  She describes     thinking she was seeing waterfalls in her house and thought her grandson was in     her " home when he was not actually there.  She apparently had called our office,     was instructed to discontinue the voriconazole and since she discontinued that     she has not had any further hallucinations, vision changes, etc.  She currently     denies any other new or concerning symptoms.  Denies increased dyspnea,     coughing, wheezing, hemoptysis, fever or chills.              I have reviewed ROS, medical, surgical and family history and agree with those     as entered in the chart.  I personally reviewed previous lab work, imaging and     provider notes.                           Allergies and Medications      Allergies:        Coded Allergies:             NO KNOWN ALLERGIES (Unverified , 9/14/20)      Medications    Last Reconciled on 9/14/20 11:35 by DMITRIY VANCE      Nitroglycerin (Nitroglycerin) 0.4 Mg Tab.subl      0.4 MG SL Q5MIN PRN for CHEST PAIN, TAB         Reported         8/25/20       Fluticasone/Umeclidin/Vilanter (Trelegy Ellipta 100-62.5-25) 1 Each Blst.w.dev      1 PUFF INH RTQDAY, #1 INH         Reported         8/25/20       Esomeprazole Mag (nexIUM) 40 Mg Capsule      40 MG PO QDAY@07, #30 CAP 0 Refills         Reported         8/25/20       Multivitamins (Multi-Vitamin) 1 Each Tablet      1 TAB PO 5DAYS WEEK, #30 TAB 0 Refills         Reported         8/25/20       Rosuvastatin Calcium (Crestor*) 40 Mg Tablet      40 MG PO HS, #30 TAB 0 Refills         Reported         8/25/20       Cholecalciferol (Vitamin D3*) 2,000 Unit Tablet      2000 UNITS PO QDAY, #30 TAB         Reported         4/15/20       Hctz (hydroCHLOROthiazide) 25 Mg Tablet      25 MG PO BID, #60 TAB 0 Refills         Reported         4/15/20       Metoprolol Succinate (Metoprolol Succinate) 50 Mg Tab.er.24h      50 MG PO HS, #30 TAB.SR.24H 0 Refills         Reported         1/22/20       Folic Acid/Vit Bcomp,C (Super B-Complex Folic-Vit C Tb) 400 Mcg Tablet      1 TAB PO QDAY, TAB         Reported         1/22/20        Aspirin EC (Aspirin EC) 81 Mg Tablet.      81 MG PO QDAY, #30 TAB.SR 0 Refills         Reported         20            Plan      Orders:  Phone Eval  mi [phone]      Instructions      * Chronic conditions reviewed and taken in consideration for today's treatment       plan.      * Plan Of Care: ()      * Patient instructed to seek medical attention urgently for new or worsening       symptoms.      * Patient was educated/instructed on their diagnosis, treatment and medications       today.      * Recommend self monitoring. Instructions given.      * Recommend self quarantine for 14 days.      * Recommend self quarantine until without fever for 72 hours without using fever       reducing medications.      * Recommends over the counter medications for symptom management.            ASSESSMENT:       1. Fungal infection of the lung, has failed treatment with voriconazole due to     side effects of hallucinations and vision changes.      2. Right middle lobe nodule decreasing in size, felt to be infectious in nature.      3. Family history with first degree relative with active TB and father who      from tuberculosis.      4.  History of fungal pneumonia requiring wedge resection of the lung.      5. Mild COPD with severely decreased diffusion capacity on PFTs.      6. Chronic dyspnea.      7. Former heavy smoker in remission.      8.  Recent vision changes and hallucinations while on voriconazole, currently     resolved.            PLAN:      1.  I have discussed with patient in detail regarding her recent what appeared     to be side effects of voriconazole that have now resolved since she has     discontinued this medication.        2.  I have advised her that vision changes are listed as a possible side effect     of voriconazole, but hallucinations are not, however it is still possible that     this was due to this medication.  I have advised her that I will have her stay     off of the  voriconazole and have her try a different antifungal for her mold     type fungus that has grown on bronchoscopy instead.  I will have her start     cresemba including loading dose.  I have written for that today.      3. I will have her get repeat labs including CBC and CMP in 6-8 weeks and have     her follow up at that time, otherwise keep her next scheduled follow up     appointment.        4. I have specifically instructed patient to call us for any new or concerning     symptoms and she verbalized understanding.            Electronically signed by ROBERTO ASIF PA-C  10/06/2020 15:47       Disclaimer: Converted document may not contain table formatting or lab diagrams. Please see [employer] for the authenticated document.

## 2021-05-28 NOTE — PROGRESS NOTES
Patient: JOSR BUTTERFIELD     Acct: [id number]     Report: #[id number]  UNIT #: [id number]     : 1945    Encounter Date:07/15/2020  PRIMARY CARE: DARLENE ENG  ***Signed***  --------------------------------------------------------------------------------------------------------------------  Chief Complaint      Encounter Date      Jul 15, 2020            Primary Care Provider      DARLENE ENG            Referring Provider      SELF,REFERRED            Patient Complaint      Patient is complaining of      3 month follow up/soa            VITALS      Height 5 ft 4 in / 162.56 cm      Weight 128 lbs 0 oz / 58.114897 kg      BSA 1.62 m2      BMI 22.0 kg/m2      Temperature 98.0 F / 36.67 C - Tympanic      Pulse 60      Respirations 16      Blood Pressure 140/70 Sitting, Right Arm      Pulse Oximetry 92%, room air            HPI      The patient is a 75 year old female former heavy smoker, she used to smoke 2     pack per day for about 30 years. She has history of fungal pneumonia status post    right lung wedge resection of the nodule. She underwent CT scan of the chest     last month and presents today to go over those results. The patient states she     is using trelegy ellipta inhaler on a daily basis and feels like it helps her     quite a bit. She tried multiple other inhalers in the past and trelegy ellipta     inhaler has helped do her activities of daily living without issues. She does     not have to use her rescue inhaler now that she has been on trelegy ellipta     inhaler. She denies weight loss, hemoptysis, pleuritic chest pain and     significant dyspnea. She only gets short of breath if she really overdoes     herself and exercises on a hot humid day. He shortness of breath is improved     with her inhaler. She does have some fatigue.            ROS      Constitutional:  Complains of: Fatigue; Denies: Fever, Weight gain, Weight loss,    Chills, Insomnia, Other       Respiratory/Breathing:  Complains of: Shortness of air; Denies: Wheezing, Cough,    Hemoptysis, Pleuritic pain, Other      Endocrine:  Denies: Polydipsia, Polyuria, Heat/cold intolerance, Abnorml     menstrual pattern, Diabetes, Other      Eyes:  Denies: Blurred vision, Vision Changes, Other      Ears, nose, mouth, throat:  Denies: Mouth lesions, Thrush, Throat pain,     Hoarseness, Allergies/Hay Fever, Post Nasal Drip, Headaches, Recent Head Injury,    Nose Bleeding, Neck Stiffness, Thyroid Mass, Hearing Loss, Ear Fullness, Dry     Mouth, Nasal or Sinus Pain, Dry Lips, Nasal discharge, Nasal congestion, Other      Cardiovascular:  Complains of: Dyspnea on Exertion; Denies: Palpitations,     Syncope, Claudication, Chest Pain, Wake up Gasping for air, Leg Swelling,     Irregular Heart Rate, Cyanosis, Other      Gastrointestinal:  Denies: Nausea, Constipation, Diarrhea, Abdominal pain,     Vomiting, Difficulty Swallowing, Reflux/Heartburn, Dysphagia, Jaundice,     Bloating, Melena, Bloody stools, Other      Genitourinary:  Denies: Urinary frequency, Incontinence, Hematuria, Urgency,     Nocturia, Dysuria, Testicular problems, Other      Musculoskeletal:  Denies: Joint Pain, Joint Stiffness, Joint Swelling, Myalgias,    Other      Hematologic/lymphatic:  DENIES: Lymphadenopathy, Bruising, Bleeding tendencies,     Other      Neurological:  Denies: Headache, Numbness, Weakness, Seizures, Other      Psychiatric:  Denies: Anxiety, Appropriate Effect, Depression, Other      Sleep:  No: Excessive daytime sleep, Morning Headache?, Snoring, Insomnia?, Stop    breathing at sleep?, Other      Integumentary:  Denies: Rash, Dry skin, Skin Warm to Touch, Other      Immunologic/Allergic:  Denies: Latex allergy, Seasonal allergies, Asthma,     Urticaria, Eczema, Other      Immunization status:  No: Up to date            FAMILY/SOCIAL/MEDICAL HX      Surgical History:  Yes: Bowel Surgery (COLONOSCOPY), Cholecystectomy (1980'S),      Orthopedic Surgery (R TRIGGER THUMB RELEASE, pin in left ankle), Other Surgeries    (part of lung); No: AAA Repair, Abdominal Surgery, Adenoids, Angioplasty,     Appendectomy, Back Surgery, Bladder Surgery, Breast Surgery, CABG, Carotid     Stenosis, Ear Surgery, Eye Surgery, Head Surgery, Hernia Surgery, Kidney     Surgery, Nose Surgery, Oral Surgery, Prostatectomy, Rectal Surgery, Spinal     Surgery, Testicular Surgery, Throat Surgery, Tonsils, Valve Replacement,     Vascular Surgery      Heart - Family Hx:  Mother      Diabetes - Family Hx:  Mother      Cancer/Type - Family Hx:  Brother (lung cancer)      Other Family Medical History:  Mother, Father (TB)      Is Father Still Living?:  No      Is Mother Still Living?:  No       Family History:  Yes      Social History:  No Tobacco Use, No Alcohol Use, No Recreational Drug use      Smoking status:  Former smoker (2 ppd x 30 years, quit 1994)      Hysterectomy:  Yes      Anticoagulation Therapy:  No      Antibiotic Prophylaxis:  No      Medical History:  Yes: Arthritis (HANDS; OSTEO), Chemotherapy/Cancer (SKIN CA     REMOVED FROM NOSE), Chronic Bronchitis/COPD, High Blood Pressure, High     Cholesterol, Reflux Disease; No: Alcoholism, Allergies, Anemia, Asthma, Atrial     Fibrillation, Blood Disease, Broken Bones, Cataracts, Chemical Dependency,     Emphysema, Chronic Liver Disease, Colon Trouble, Colitis, Diverticulitis,     Congestive Heart Failu, Deafness or Ringing Ears, Convulsions, Depression,     Anxiety, Bipolar Disorder, PTSD, Diabetes, Epilepsy, Seizures, Forgetfullness,     Glaucoma, Gall Stones, Gout, Head Injury, Heart Attack, Heart Murmur, GERD,     Hemorrhoids/Rectal Prob, Hepatitis, Hiatal Hernia, HIV (Do not ask - volu,     Jaundice, Kidney or Bladder Disease, Kidney Stones, Migrane Headaches, Mitral     Valve Prolapse, Night sweats, Phlebitis, Psychiatric Care, Rheumatic Fever,     Sexually Transmitted Dis, Shortness Of Breath, Sinus Trouble,  Skin     Disease/Psoriais/Ecz, Stroke, Thyroid Problem, Tuberculosis or Pos TB Te,     Miscellaneous Medical/oth      Psychiatric History      none            PREVENTION      Hx Influenza Vaccination:  Yes      Date Influenza Vaccine Given:  Nov 1, 2019      Influenza Vaccine Declined:  No      2 or More Falls in Past Year?:  No      Fall Past Year with Injury?:  No      Hx Pneumococcal Vaccination:  Yes      Encouraged to follow-up with:  PCP regarding preventative exams.      Chart initiated by      nikia valenzuela ma            ALLERGIES/MEDICATIONS      Allergies:        Coded Allergies:             NO KNOWN ALLERGIES (Unverified , 7/15/20)      Medications    Last Reconciled on 7/15/20 12:41 by DANI ROGERS,       Cholecalciferol (Vitamin D3*) 2,000 Unit Tablet      2000 UNITS PO QDAY, #30 TAB         Reported         4/15/20       Hctz (hydroCHLOROthiazide) 25 Mg Tablet      25 MG PO BID, #60 TAB 0 Refills         Reported         4/15/20       Metoprolol Succinate (Metoprolol Succinate) 50 Mg Tab.er.24h      50 MG PO QDAY, #30 TAB.SR.24H 0 Refills         Reported         1/22/20       Folic Acid/Vit Bcomp,C (Super B-Complex Folic-Vit C Tb) 400 Mcg Tablet      1 TAB PO QDAY, TAB         Reported         1/22/20       Fluticasone/Umeclidin/Vilanter (Trelegy Ellipta 100-62.5-25) 1 Each Blst.w.dev      1 PUFF INH RTQDAY, #1 INH         Reported         1/22/20       Aspirin EC (Aspirin EC) 81 Mg Tablet.dr      81 MG PO QDAY, #30 TAB.SR 0 Refills         Reported         1/22/20      Current Medications      Current Medications Reviewed 7/15/20            EXAM      VITAL SIGNS:  Reviewed.        GENERAL: Thin female in no acute distress on room air.       NECK:  Supple without tracheal deviation or lymphadenopathy.  No thyromegaly     appreciated.      LYMPHATICS:  No cervical or supraclavicular lymphadenopathy.      HEENT: Pupils are equal, round and reactive to light. There is no scleral     icterus.  Nares  patent without hypertrophy of the turbinates. No erythema of the    passages.  TMs are clear bilaterally with good cone of light. The posterior     pharynx is without  lesions or erythema.      RESPIRATORY:  Clear to auscultation bilaterally.  No wheezes, rales or rhonchi.     Tympanic to percussion.        CARDIOVASCULAR:  Regular rate and rhythm.  No murmurs, gallops or rubs.  No     lower extremity edema.  Equal radial pulses.        GI: Soft, nontender, nondistended, no organomegaly.  Bowel sounds present in all    four quadrants.      MUSCULOSKELETAL:  No joint effusions, erythema or warmth over the major joint     systems.      SKIN:  No rashes or lesions.      NEUROLOGIC: Cranial nerves II-XII are intact bilaterally.  Moves all     extremities. Ambulates with ease.      PSYCH:  Appropriate mood and affect.      Vtials      Vitals:             Height 5 ft 4 in / 162.56 cm           Weight 128 lbs 0 oz / 58.877822 kg           BSA 1.62 m2           BMI 22.0 kg/m2           Temperature 98.0 F / 36.67 C - Tympanic           Pulse 60           Respirations 16           Blood Pressure 140/70 Sitting, Right Arm           Pulse Oximetry 92%, room air            REVIEW      Results Reviewed      PCCS Results Reviewed?:  Yes Previous [facility] Records      Lab Results      I personally reviewed Telehealth note.      Radiographic Results               [facility]                PACS RADIOLOGY REPORT            Patient: JOSR BUTTERFIELD   Acct: #[id number]   Report: #[id number]            UNIT #: [id number]    DOS: 06/15/20 1021      INSURANCE:[employer]   ORDER #:[id number]      LOCATION:[facility]     : 1945            PROVIDERS      ADMITTING:     ATTENDING: DANI ROGERS      FAMILY:  NONE,MD   ORDERING:  DANI ROGERS         OTHER:    DICTATING:  SAMAN TUTTLE MD            REQ #:[id number]   EXAM:CHWO - CT CHEST without  CONTRAST      REASON FOR EXAM:  MYCOSIS      REASON FOR VISIT:  MYCOSIS            *******Signed******         PROCEDURE:   CT CHEST WITHOUT CONTRAST             COMPARISON:   [facility], CT, CHEST W/ CONTRAST, 5/21/2013,     15:35.             INDICATIONS:   MYCOSIS             TECHNIQUE:   CT images were created without the administration of contrast     material.               PROTOCOL:     Standard imaging protocol performed                RADIATION:     DLP: 469 mGy*cm          Automated exposure control was utilized to minimize radiation dose.              FINDINGS:         The patient has had interval resection of the nodule in the apex of the right     upper lobe.  There is       a pulmonary suture line with expected postsurgical scarring.  There has been     interval development       of an irregular shaped nodule in the lateral segment of the right middle lobe on    image 52 measuring       1.1 x 0.9 cm.  This is indeterminate.  I would recommend further evaluation with    a whole-body       PET-CT exam.  There are 2 faint nodules located in the anteromedial basilar     segment of the left       lower lobe on image 47 and 48 measuring on the order of 4 mm in size of     questionable significance.        There has been interval development of areas of interstitial thickening and     small micro-nodules in       the periphery of the lungs.  This is best demonstrated in the lower lobes.  This    is nonspecific but       can be seen with respiratory bronchiolitis associated with smoking.  The patient    has underlying       emphysema.  There are no layering pleural effusions.  There are non     pathologically enlarged       mediastinal lymph nodes.  There are calcified right hilar lymph nodes indicating    old healed       granulomatous disease.  There are atherosclerotic vascular calcifications which     includes       involvement of the coronary arteries.  The heart size is normal.  There  is     stable chronic scarring       in the right kidney.  There has been interval development of a 3 mm     nonobstructing stone in the       upper pole of the left kidney.  There are no suspicious osteolytic or sclerotic     lesions within the       bony thorax.             CONCLUSION:         1. Interval resection of the nodule in the apex of the right upper lobe with     expected postsurgical       changes.      2. Interval development of irregular shaped nodule in the lateral segment of the    right middle lobe       measuring 1.1 x 0.9 cm.  This is indeterminate.  I would recommend further     evaluation with a       whole-body PET-CT exam.      3. There are 2 faint nodules in the left lower lobe as described.  There also     areas of interstitial       thickening and small micronodules in the periphery of both lungs.  This is     nonspecific but can be       seen with respiratory bronchiolitis associated with smoking.      4. Emphysema.      5. Additional findings as noted above.              SAMAN TUTTLE MD             Electronically Signed and Approved By: SAMAN TUTTLE MD on 6/15/2020 at 11:55                        Until signed, this is an unconfirmed preliminary report that may contain      errors and is subject to change.                                              WORBR:      D:06/15/20 1155            Assessment      Family history of primary tuberculosis - Z83.6            Lung nodules - R91.8            Notes      New Diagnostics      * Fungal Serology Prof, As Soon As Possible         Dx: Family history of primary tuberculosis - Z83.6      * ANTWON Screen/Reflex Hep Titer, Month         Dx: Family history of primary tuberculosis - Z83.6      * Immunoglobulin  E (I, Week         Dx: Family history of primary tuberculosis - Z83.6      * Anticytoplas. Neutro ANCA, Routine         Dx: Family history of primary tuberculosis - Z83.6      * 1 3 BETA D GLUCAN FUNGIT BDGLU, Routine         Dx: Family history of  primary tuberculosis - Z83.6      * Quantiferon Tb Test, Routine         Dx: Family history of primary tuberculosis - Z83.6      ASSESSMENT:      1. Right middle lobe lung nodule 1.1 X 0.9 cm       2. History of solitary pulmonary nodule status post resection of the right apex     of the lung.        3. History of fungal infection of the lung.       4. Chronic obstructive pulmonary disease with emphysema without acute     exacerbation.       5. Chronic dyspnea.       6. Former heavy smoker in remission.       7. Family history of tuberculosis.             PLAN:      1. I have reviewed the patient's CT scan of the chest with her to day. A PET     scan had already been ordered and is pending approval by her insurance. As soon     as this is done we will call her with the results and appropriate biopsy versus     resection versus other will be determined based on the results of the PET scan.       2. I have ordered fungal serologies, ANTWON, ANCA, IgE level, beta glucan and     quantiferon test to work up her lung nodules as well as her significant history     of fungal pneumonia and family history of tuberculosis.       3. I congratulated the patient for her ongoing smoking cessation.       4. Continue trelegy ellipta inhale, she does not need any refills today.       5. The patient is unsure of her pneumonia vaccination. She says she will check     with Dr. Crowell's office to see if they have a record of it, if not she is     interested in getting a pneumonia shot at her next office visit.       6. I will request records from Dr. [facility]     [facility] in [place].       7. I will see her back in 1 month to go over the PET scan and plan thereafter.            Patient Education      Patient Education Provided:  Lung Cancer (vs fungal pneumonia vs other),     Transthoracic Needle Bx      Time Spent:  > 50% /[facility]            Electronically signed by DANI ROGERS   07/23/2020 09:07       Disclaimer: Converted document may not contain table formatting or lab diagrams. Please see [employer] for the authenticated document.

## 2021-05-28 NOTE — PROGRESS NOTES
Patient: JOSR BUTTERFIELD     Acct: [id number]     Report: #[id number]  UNIT #: [id number]     : 1945    Encounter Date:04/15/2020  PRIMARY CARE: DARLENE ENG  ***Signed***  --------------------------------------------------------------------------------------------------------------------  TELEHEALTH NOTE      History of Present Illness            Chief Complaint: 3 month follow up/ emphysema/ PFT, 6 min walk results            Josr Butterfield is presenting for evaluation via Telehealth visit. Verbal     consent obtained before beginning visit.            Provider spent 21 minutes with the patient during telehealth visit.            The following staff were present during the visit:Mena Flowers MA, Sammi Hernadez DO            The patient is a 74 year old female with a history of COPD, emphysema and heavy     tobacco abuse in remission.  She has a history of fungal infection status post     wedge resection of a spiculated nodule. She has not had follow up imaging, this     was done in [place], [place] back in .  She was referred to me for chronic     cough, shortness of breath and COPD management. She is on Trelegy ellipta after     failing Symbicort, Breo and multiple other inhalers.  She is using this daily     and notices that when she forgets to take it, she does not have any worsening     symptoms, but she does breath better when she takes it. She was sick over the     past month with a bladder infection and required antibiotics for that.  She     reports a 10 pound unintentional weight loss, her appetite is slowly improving.     She denies hemoptysis, sputum production, chest tightness. She is short of     breath with exertion, but is able to do all of her ADLs.  She denies night     sweats, fevers or chills.      Past Med History      Pt past History: Emphysema            Former Smoker: Pt started smoking at age 14, 2 ppd x 20 years, quit about 30     years ago            Flu and  Pneumonia Vaccine: Current      Overview of Symptoms      Pt complains of: SOA upon exertion, wt loss            Pt denies: cough, wheezing, fever, chills, nausea, vomiting            Most Recent Lab Findings      Reviewed CXR from Jan 2020; emphysema noted no other abnormalities      Reveiwed PFT's mild obstructive defect with severe reduction in DLCO      Reviewed Six minute walk test, did not require supplemental oxygen      Reviewed Alpha-1 antitrypsin test MS genotype with normal levels.            Plan/Instructions      Ambulatory Assessment/Plan:        Weight loss, non-intentional - R63.4            Family history of primary tuberculosis - Z83.6            Fungal infection of lung - B49            Notes      New Medications      * CHOLECALCIFEROL (Vitamin D3*) 2,000 UNIT TABLET: 2,000 UNITS PO QDAY #30      Changed Medications      * HCTZ (hydroCHLOROthiazide) 25 MG TABLET: 25 MG PO BID #60         Replaced Unknown Strength CAPSULE: PO QDAY #30      Discontinued Medications      * Nystatin (Nystatin*) 100,000 UNIT/1 ML ORAL.SUSP: 10 ML SWISH.SWAL QID #60      New Diagnostics      * Chest W/O Cont CT, SCHEDULED PROCEDURE         Dx: Weight loss, non-intentional - R63.4      Plan/Instructions            * Plan Of Care: ()            * Chronic conditions reviewed and taken into consideration for today's treatment       plan.      * Patient instructed to seek medical attention urgently for new or worsening       symptoms.      * Patient was educated/instructed on their diagnosis, treatment and medications       prior to discharge from the clinic today.            ASSESSMENT:       1. History of spiculated lung nodule, status post resectin.      2. History of fungal infection of the lung, undetermined organism.      3. Family history of primary tuberculosis in father.      4. Dyspnea with exertion.      5. COPD with emphysema without acute exacerbation.      6. Former tobacco abuse in remission.      7.   Unintentional weight loss.            PLAN:      1.  I have ordered a dedicated chest CT without contrast given her multiple     complaints of unintentional weight loss, ongoing shortness of breath and she has     risk factors including former heavy smoking with a spiculated nodule, a former     fungal infection and a family history of tuberculosis.      2. Continue Trelegy ellipta and albuterol as needed.      3.  I will see her back in 2-3 months. We will call her with the results of her     CT if there is anything that we need to do prior to her follow up visit.            Electronically signed by DANI ROGERS  04/17/2020 08:23       Disclaimer: Converted document may not contain table formatting or lab diagrams. Please see [employer] for the authenticated document.

## 2021-05-28 NOTE — PROGRESS NOTES
Patient: JOSR BUTTERFIELD     Acct: [id number]     Report: #[id number]  UNIT #: [id number]     : 1945    Encounter Date:2020  PRIMARY CARE: DARLENE ENG  ***Signed***  --------------------------------------------------------------------------------------------------------------------  Chief Complaint      Encounter Date      2020            Primary Care Provider      DARLENE ENG            Referring Provider      SELF,REFERRED            Patient Complaint      Patient is complaining of      New pt here for copd            VITALS      Height 5 ft 4 in / 162.56 cm      Weight 134 lbs 0 oz / 60.358345 kg      BSA 1.65 m2      BMI 23.0 kg/m2      Temperature 98.4 F / 36.89 C - Temporal      Pulse 70      Respirations 16      Blood Pressure 140/76 Sitting, Right Arm      Pulse Oximetry 95%, Room air            HPI      The patient is a very pleasant 74 year old former tobacco user 2 pack per day     for 30 years. She quit approximately 25 or more years ago. She was diagnosed     with chronic obstructive pulmonary disease approximately 6 years ago and started    on Symbicort followed by breo. She did not do well with those inhalers as she     still felt short of breath with minimal exertion so about 4 months ago her     primary care provider switched her to trelegy ellipta inhaler. She thinks this     has been working very well for her and would like more samples and a     prescription of  it today. She has never underwent formal pulmonary function     testing, she does have a history of fungal pneumonia. She had a wedge resection     of a portion of her lung but she does not recall which side. They though she had    cancer but on pathology this turned out to be a fungal infection. She was never     treated with systemic antifungals and did not follow back up. She was diagnosed     in the past with obstruction and central sleep apnea but underwent repeat  sleep    study which proved she  did not have these diagnoses. 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 She has 1 brother that  of     lung cancer, he was a smoker. She was hospitalized approximately 2 years ago and    was very sick with bronchoscopy/pneumonia. She has never been on the ventilator.    She does not use rescue inhaler but is compliant with her trelegy ellipta     inhaler once daily.            A full 14 point Review of Systems was explored with the patient and noted in the    chart. I agree with that as documented.             PAST MEDICAL HISTORY:      1. Previous tobacco abuse of cigarettes 60 pack year smoking history.       2. Chronic obstructive pulmonary disease with unspecified severity.       3. Basal cell carcinoma removed from the nose.       4. Hypertension.       5. Hyperlipidemia.       6. Gastroesophageal reflux disease.       7. Osteoarthritis.       8. History of fungal pneumonia status post wedge resection of the lung.             PAST SURGICAL HISTORY:      1. Cholecystectomy.      2. Right trigger thumb release.       3. Ankle surgery on the left.       4. Wedge resection of a pulmonary nodule that turned out to be fungal pneumonia.          FAMILY HISTORY:      1. One brother  of lung cancer. Father with history of tuberculosis. Mother     with history of heart disease and diabetes.             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 She is     here today with her daughter. No illicit drug use or alcohol use.             Medications were reviewed and updated in the chart.            ROS      Constitutional:  Complains of: Fatigue; Denies: Fever, Weight gain, Weight loss,    Chills, Insomnia, Other      Respiratory/Breathing:  Complains of: Shortness of air; Denies: Wheezing, Cough,    Hemoptysis, Pleuritic pain, Other      Endocrine:  Complains of: Heat/cold intolerance; Denies: Polydipsia, Polyuria,     Abnorml  menstrual pattern, Diabetes, Other      Eyes:  Denies: Blurred vision, Vision Changes, Other      Ears, nose, mouth, throat:  Denies: Mouth lesions, Thrush, Throat pain,     Hoarseness, Allergies/Hay Fever, Post Nasal Drip, Headaches, Recent Head Injury,    Nose Bleeding, Neck Stiffness, Thyroid Mass, Hearing Loss, Ear Fullness, Dry     Mouth, Nasal or Sinus Pain, Dry Lips, Nasal discharge, Nasal congestion, Other      Cardiovascular:  Denies: Palpitations, Syncope, Claudication, Chest Pain, Wake     up Gasping for air, Leg Swelling, Irregular Heart Rate, Cyanosis, Dyspnea on     Exertion, Other      Gastrointestinal:  Complains of: Reflux/Heartburn; Denies: Nausea, Constipation,    Diarrhea, Abdominal pain, Vomiting, Difficulty Swallowing, Dysphagia, Jaundice,     Bloating, Melena, Bloody stools, Other      Genitourinary:  Denies: Urinary frequency, Incontinence, Hematuria, Urgency,     Nocturia, Dysuria, Testicular problems, Other      Musculoskeletal:  Complains of: Joint Pain; Denies: Joint Stiffness, Joint     Swelling, Myalgias, Other      Hematologic/lymphatic:  DENIES: Lymphadenopathy, Bruising, Bleeding tendencies,     Other      Neurological:  Denies: Headache, Numbness, Weakness, Seizures, Other      Psychiatric:  Denies: Anxiety, Appropriate Effect, Depression, Other      Sleep:  Yes: Snoring, Insomnia?; No: Excessive daytime sleep, Morning Headache?,    Stop breathing at sleep?, Other      Integumentary:  Complains of: Dry skin, Other (itchy); Denies: Rash, Skin Warm     to Touch      Immunologic/Allergic:  Complains of: Seasonal allergies; Denies: Latex allergy,     Asthma, Urticaria, Eczema, Other      Immunization status:  No: Up to date            FAMILY/SOCIAL/MEDICAL HX      Surgical History:  Yes: Bowel Surgery (COLONOSCOPY), Cholecystectomy (1980'S),     Orthopedic Surgery (R TRIGGER THUMB RELEASE, pin in left ankle), Other Surgeries    (part of lung)      Heart - Family Hx:  Mother       Diabetes - Family Hx:  Mother      Cancer/Type - Family Hx:  Brother (lung cancer)      Other Family Medical History:  Mother, Father (TB)      Is Father Still Living?:  No      Is Mother Still Living?:  No      Social History:  No Tobacco Use, No Alcohol Use, No Recreational Drug use      Smoking status:  Former smoker (2 ppd x 30 years, quit 1994)      Hysterectomy:  Yes      Anticoagulation Therapy:  No      Antibiotic Prophylaxis:  No      Medical History:  Yes: Arthritis (HANDS; OSTEO), Chemotherapy/Cancer (SKIN CA     REMOVED FROM NOSE), Chronic Bronchitis/COPD, High Blood Pressure, High Choles    terol, Reflux Disease; No: Blood Disease, Deafness or Ringing Ears,     Hemorrhoids/Rectal Prob, Shortness Of Breath, Miscellaneous Medical/oth      Psychiatric History      None            PREVENTION      Hx Influenza Vaccination:  Yes      Date Influenza Vaccine Given:  Nov 1, 2019      Influenza Vaccine Declined:  No      2 or More Falls Past Year?:  No      Fall Past Year with Injury?:  No      Hx Pneumococcal Vaccination:  Yes      Encouraged to follow-up with:  PCP regarding preventative exams.      Chart initiated by      Juliet Badillo MA            ALLERGIES/MEDICATIONS      Allergies:        Coded Allergies:             NO KNOWN ALLERGIES (Unverified , 1/22/20)      Medications    Last Reconciled on 1/22/20 14:17 by DANI ROGERS DO      (cbd oil)   No Conflict Check               Reported         1/22/20       Biotin (Biotin) 5 Mg Tab      5 MG PO TID, #30 TAB         Reported         1/22/20       traZODone HCl (Desyrel) Unknown Strength Tablet      PO HS, #30 TAB 0 Refills         Reported         1/22/20       Hctz (hydroCHLOROthiazide) Unknown Strength Capsule      PO QDAY, #30 CAP 0 Refills         Reported         1/22/20       Metoprolol Succinate (Metoprolol Succinate) 50 Mg Tab.er.24h      50 MG PO QDAY, #30 TAB.SR.24H 0 Refills         Reported         1/22/20       Folic Acid/Vit Bcomp,C  (Super B-Complex Folic-Vit C Tb) 400 Mcg Tablet      1 TAB PO QDAY, TAB         Reported         1/22/20       Fluticasone/Umeclidin/Vilanter (Trelegy Ellipta 100-62.5-25) 1 Each Blst.w.dev      1 PUFF INH RTQDAY, #1 INH         Reported         1/22/20       Aspirin EC (Aspirin EC) 81 Mg Tablet.      81 MG PO QDAY, #30 TAB.SR 0 Refills         Reported         1/22/20      Current Medications      Current Medications Reviewed 1/22/20            EXAM      VITAL SIGNS:  Reviewed.        GENERAL: Patient appears in no acute distress, speaking in full sentences on     room air.       NECK:  Supple without tracheal deviation or lymphadenopathy.  No thyromegaly     appreciated.      LYMPHATICS:  No cervical or supraclavicular lymphadenopathy.      HEENT: Pupils are equal, round and reactive to light. There is no scleral     icterus.  Nares patent without hypertrophy of the turbinates. No erythema of the    passages.  TMs are clear bilaterally with good cone of light. The posterior     pharynx is with erythematous petechial almost pustular looking lesions along the    left side of the hard palate and these extend into the posterior pharyngeal     region in the tonsilar pillars. There are no yellow exudates or white patches,     this is nonpainful to the patient.  There is a scar over the bridge of the nose     from skin cancer removal.       RESPIRATORY: Diminished breath sounds in the upper lobes with some crackles in     the right lower lobe.  No wheezes or rhonchi.  Tympanic to percussion.        CARDIOVASCULAR:  Regular rate and rhythm.  No murmurs, gallops or rubs.  No     lower extremity edema.  Equal radial pulses.        GI: Soft, nontender, nondistended, no organomegaly.  Bowel sounds present in all    four quadrants.      MUSCULOSKELETAL:  No joint effusions, erythema or warmth over the major joint     systems.      SKIN:  No rashes or lesions.      NEUROLOGIC: Cranial nerves II-XII are intact bilaterally.   Moves all ex    tremities. Ambulates with ease.      PSYCH:  Appropriate mood and affect.      Vtials      Vitals:             Height 5 ft 4 in / 162.56 cm           Weight 134 lbs 0 oz / 60.422406 kg           BSA 1.65 m2           BMI 23.0 kg/m2           Temperature 98.4 F / 36.89 C - Temporal           Pulse 70           Respirations 16           Blood Pressure 140/76 Sitting, Right Arm           Pulse Oximetry 95%, Room air            REVIEW      Results Reviewed      PCCS Results Reviewed?:  Yes Prev Radiology Results      Radiographic Results      I personally reviewed chest x-ray from 01/24/19 showing emphysema with no other     acute process.            Assessment      COPD (chronic obstructive pulmonary disease) with emphysema - J43.9            Notes      New Medications      * Aspirin EC 81 MG TABLET.DR: 81 MG PO QDAY #30      * Fluticasone/Umeclidin/Vilanter (Trelegy Ellipta 100-62.5-25) 1 EACH       BLST.W.DEV: 1 PUFF INH RTQDAY #1      * Folic Acid/Vit Bcomp,C (Super B-Complex Folic-Vit C Tb) 400 MCG TABLET: 1 TAB       PO QDAY      * Metoprolol Succinate 50 MG TAB.ER.24H: 50 MG PO QDAY #30      * HCTZ (hydroCHLOROthiazide) Unknown Strength CAPSULE: PO QDAY #30      * traZODone HCl (Desyrel) Unknown Strength TABLET: PO HS #30      * BIOTIN (Biotin) 5 MG TAB: 5 MG PO TID #30      * (cbd oil):       * Nystatin (Nystatin*) 100,000 UNIT/1 ML ORAL.SUSP: 10 ML SWISH.SWAL QID #60      Discontinued Medications      * AMOXICILLIN/CLAVULANATE K (Augmentin 875/125 Mg) 1 EACH TABLET: 875 MG PO BID       7 Days #14      * Benzonatate 200 MG CAPSULE: 200 MG PO Q8H PRN COUGH #21      * predniSONE 20 MG TABLET: 20 MG PO TID 4 Days #12      New Diagnostics      * PFT-Comp, PrePost,DLCO,BodyBox, Routine         Dx: COPD (chronic obstructive pulmonary disease) with emphysema - J43.9      * 6 Min Walk w O2 Titration Test, Routine         Dx: COPD (chronic obstructive pulmonary disease) with emphysema - J43.9      New  Office Procedures      * Follow Up Appointment, 3 Months         Dx: COPD (chronic obstructive pulmonary disease) with emphysema - J43.9      ASSESSMENT:      1. Chronic obstructive pulmonary disease with emphysema unspecified in severity.          2. History of fungal pneumonia status post wedge resection of a pulmonary     nodule.       3. First degree relative with tuberculosis.       4. Chronic dyspnea.       5. ?  Thrush.       6. Insomnia.       7. History of both central and obstructive sleep apnea no longer requiring CPAP.          PLAN:      1. I have requested records from the facility where she had her wedge resection     and I would like to know the species of the fungus isolated. I have asked for     these from Dr. Valdez's office in [place].       2. I have ordered baseline pulmonary function test and 6 minute walk test with     oxygen titration as needed.       3. Continue trelegy ellipta inhaler. We taught appropriate technique in our     office and I counseled her on rinsing her mouth out after use.       4. I have called in Nystatin swish and swallow 10 ml 4 times a day for what I     believe is thrush.       5. I told the patient to follow up with her primary care provider if she were to    have worsening sores or pain to be considered swabbed for strep.       6. I congratulated the patient on her smoking cessation.       7. I have requested most recent CT scan of the chest from her previous provider.    If this is not available, we may end up needing dedicated CT scan of the chest     but I will hold on this for now until I have had a chance to review records.       8. Follow up with me in 3 months to go over pulmonary function test.            Patient Education      Education resources provided:  Yes      Patient Education Provided:  COPD, How to use an Inhaler, Lung Cancer      Time Spent:  > 50% /[facility]      Other Education:  alpha 1 performed, counseling             Electronically signed by DANI ROGERS  01/29/2020 10:19       Disclaimer: Converted document may not contain table formatting or lab diagrams. Please see [facility] for the authenticated document.

## 2021-07-01 ENCOUNTER — TELEPHONE (OUTPATIENT)
Dept: INTERNAL MEDICINE | Facility: CLINIC | Age: 76
End: 2021-07-01

## 2021-07-01 NOTE — TELEPHONE ENCOUNTER
"    Caller: Lianne Bardales    Relationship: Self    Best call back number: [phone]    Medication needed: \"CRESTOR 40MG\"     When do you need the refill by: PATIENT IS OUT        Does the patient have less than a 3 day supply:  [x] Yes  [] No    What is the patient's preferred pharmacy: [address] [phone] [facility] [phone] FX         "

## 2021-07-01 NOTE — TELEPHONE ENCOUNTER
Caller: Lianne Bardales    Relationship: Self    Best call back number: [phone]     What is the best time to reach you: ANY    Who are you requesting to speak with (clinical staff, provider,  specific staff member): PROVIDER      What was the call regarding: PATIENT CALLED AGAIN ABOUT THIS MEDICATION REFILL. PLEASE ADVISE PATIENT OF STATUS    Do you require a callback: YES

## 2021-07-01 NOTE — TELEPHONE ENCOUNTER
Caller: Lianne Bardales    Relationship: Self    Best call back number: [phone]    Medication needed:   Requested Prescriptions     Pending Prescriptions Disp Refills   • metoprolol tartrate (LOPRESSOR) 50 MG tablet       Sig: Take 1 tablet by mouth.   • Trelegy Ellipta 100-62.5-25 MCG/INH inhaler     • hydroCHLOROthiazide (HYDRODIURIL) 25 MG tablet       Sig: Take 1 tablet by mouth Daily.       When do you need the refill by:ASAP    What additional details did the patient provide when requesting the medication: PATIENT STATES THAT SHE ALSO NEEDS HER CHOLESTEROL MEDICATION BUT DOES NOT KNOW THE NAME OF IT.     Does the patient have less than a 3 day supply:  [x] Yes  [] No    What is the patient's preferred pharmacy: [address] [phone] [facility] [phone] FX

## 2021-07-02 ENCOUNTER — TELEPHONE (OUTPATIENT)
Dept: INTERNAL MEDICINE | Facility: CLINIC | Age: 76
End: 2021-07-02

## 2021-07-02 RX ORDER — ROSUVASTATIN CALCIUM 40 MG/1
40 TABLET, COATED ORAL NIGHTLY
Qty: 90 TABLET | Refills: 3 | Status: SHIPPED | OUTPATIENT
Start: 2021-07-02 | End: 2022-07-13

## 2021-07-02 RX ORDER — ROSUVASTATIN CALCIUM 40 MG/1
40 TABLET, COATED ORAL
COMMUNITY
Start: 2021-04-26 | End: 2021-07-02 | Stop reason: SDUPTHER

## 2021-07-02 RX ORDER — METOPROLOL TARTRATE 50 MG/1
50 TABLET, FILM COATED ORAL 2 TIMES DAILY
Qty: 180 TABLET | Refills: 3 | Status: SHIPPED | OUTPATIENT
Start: 2021-07-02 | End: 2021-08-08 | Stop reason: ALTCHOICE

## 2021-07-02 RX ORDER — METOPROLOL TARTRATE 50 MG/1
50 TABLET, FILM COATED ORAL
Status: CANCELLED | OUTPATIENT
Start: 2021-07-02

## 2021-07-02 NOTE — TELEPHONE ENCOUNTER
"Caller: Lianne Bardales     Relationship: Self     Best call back number: [phone]     Medication needed: \"CRESTOR 40MG\"      When do you need the refill by: PATIENT IS OUT           Does the patient have less than a 3 day supply:  [x]? Yes  []? No     What is the patient's preferred pharmacy: [address] [phone] [facility] [phone] FX         "

## 2021-07-02 NOTE — TELEPHONE ENCOUNTER
PATIENT HAS CALLED REQUESTING STATUS UPDATE ON REFILL FOR metoprolol tartrate (LOPRESSOR) 50 MG tablet. PATIENT STATED SHE HAS BEEN OUT FOR OVER 2 DAYS.     PATIENT'S CALL BACK: [phone]

## 2021-07-02 NOTE — TELEPHONE ENCOUNTER
Caller: Lianne Bardales    Relationship: Self    Best call back number: [phone]   Medication needed:   Requested Prescriptions     Pending Prescriptions Disp Refills   • metoprolol tartrate (LOPRESSOR) 50 MG tablet       Sig: Take 1 tablet by mouth.   • Trelegy Ellipta 100-62.5-25 MCG/INH inhaler     • hydroCHLOROthiazide (HYDRODIURIL) 25 MG tablet       Sig: Take 1 tablet by mouth Daily.       When do you need the refill by: ASAP    Does the patient have less than a 3 day supply:  [x] Yes  [] No    What is the patient's preferred pharmacy: [address] [phone] [facility] [phone] FX     PATIENT STATES SHE OUT OF THE METOPROLOL TARTRATE 50 MG AND IS GOING OUT OF TOWN THIS WEEKEND 07/03/2021.    HUB WAS TRYING TO TRANSFER PATIENT TO  OFFICE, CALL WAS DISCONNECTED.

## 2021-07-07 RX ORDER — HYDROCHLOROTHIAZIDE 25 MG/1
25 TABLET ORAL DAILY
Qty: 90 TABLET | Refills: 2 | Status: SHIPPED | OUTPATIENT
Start: 2021-07-07 | End: 2021-11-01 | Stop reason: ALTCHOICE

## 2021-08-06 ENCOUNTER — TELEPHONE (OUTPATIENT)
Dept: INTERNAL MEDICINE | Facility: CLINIC | Age: 76
End: 2021-08-06

## 2021-08-06 NOTE — TELEPHONE ENCOUNTER
Caller: Lianne Bardales    Relationship: Self    Best call back number: [phone]    What medications are you currently taking:   Current Outpatient Medications on File Prior to Visit   Medication Sig Dispense Refill   • aspirin 81 MG chewable tablet Chew 81 mg Daily.     • Biotin 5 MG capsule Take  by mouth.     • Cetirizine HCl 10 MG capsule Take  by mouth.     • Cholecalciferol (Vitamin D3) 250 MCG (60374 UT) tablet Take  by mouth.     • esomeprazole (nexIUM) 40 MG capsule TAKE 1 CAPSULE BY MOUTH DAILY This medication has been short filled to line up with your other medications     • hydroCHLOROthiazide (HYDRODIURIL) 25 MG tablet Take 1 tablet by mouth Daily. 90 tablet 2   • metoprolol tartrate (LOPRESSOR) 50 MG tablet Take 1 tablet by mouth 2 (Two) Times a Day. 180 tablet 3   • multivitamin with minerals tablet tablet Take 1 tablet by mouth Daily.     • Restasis 0.05 % ophthalmic emulsion INSTILL ONE DROP IN EACH EYE TWICE DAILY.     • rosuvastatin (CRESTOR) 40 MG tablet Take 1 tablet by mouth Every Night. 90 tablet 3   • Trelegy Ellipta 100-62.5-25 MCG/INH inhaler Inhale 1 puff Daily. 1 each 3   • Zinc 10 MG lozenge Dissolve  in the mouth.       No current facility-administered medications on file prior to visit.          Which medication are you concerned about: METOPROLOL    Who prescribed you this medication: ALBERTINA    What are your concerns: PATIENT STATES SHE USUALLY TAKES 1 A DAY BUT NOW PRESCRIPTION IS 2 A DAY. PLEASE CALL BACK TO DISCUSS

## 2021-08-08 RX ORDER — METOPROLOL SUCCINATE 50 MG/1
50 TABLET, EXTENDED RELEASE ORAL DAILY
Qty: 90 TABLET | Refills: 3 | Status: SHIPPED | OUTPATIENT
Start: 2021-08-08 | End: 2022-07-13

## 2021-09-28 NOTE — PATIENT INSTRUCTIONS
Chronic Obstructive Pulmonary Disease Exacerbation    Chronic obstructive pulmonary disease (COPD) is a long-term (chronic) condition that affects the lungs. COPD is a general term that can be used to describe many different lung problems that cause lung swelling (inflammation) and limit airflow, including chronic bronchitis and emphysema. COPD exacerbations are episodes when breathing symptoms become much worse and require extra treatment.  COPD exacerbations are usually caused by infections. Without treatment, COPD exacerbations can be severe and even life threatening. Frequent COPD exacerbations can cause further damage to the lungs.  What are the causes?  This condition may be caused by:  · Respiratory infections, including viral and bacterial infections.  · Exposure to smoke.  · Exposure to air pollution, chemical fumes, or dust.  · Things that give you an allergic reaction (allergens).  · Not taking your usual COPD medicines as directed.  · Underlying medical problems, such as congestive heart failure or infections not involving the lungs.  In many cases, the cause (trigger) of this condition is not known.  What increases the risk?  The following factors may make you more likely to develop this condition:  · Smoking cigarettes.  · Old age.  · Frequent prior COPD exacerbations.  What are the signs or symptoms?  Symptoms of this condition include:  · Increased coughing.  · Increased production of mucus from your lungs (sputum).  · Increased wheezing.  · Increased shortness of breath.  · Rapid or labored breathing.  · Chest tightness.  · Less energy than usual.  · Sleep disruption from symptoms.  · Confusion or increased sleepiness.  Often these symptoms happen or get worse even with the use of medicines.  How is this diagnosed?  This condition is diagnosed based on:  · Your medical history.  · A physical exam.  You may also have tests, including:  · A chest X-ray.  · Blood tests.  · Lung (pulmonary) function  tests.  How is this treated?  Treatment for this condition depends on the severity and cause of the symptoms. You may need to be admitted to a hospital for treatment. Some of the treatments commonly used to treat COPD exacerbations are:  · Antibiotic medicines. These may be used for severe exacerbations caused by a lung infection, such as pneumonia.  · Bronchodilators. These are inhaled medicines that expand the air passages and allow increased airflow.  · Steroid medicines. These act to reduce inflammation in the airways. They may be given with an inhaler, taken by mouth, or given through an IV tube inserted into one of your veins.  · Supplemental oxygen therapy.  · Airway clearing techniques, such as noninvasive ventilation (NIV) and positive expiratory pressure (PEP). These provide respiratory support through a mask or other noninvasive device. An example of this would be using a continuous positive airway pressure (CPAP) machine to improve delivery of oxygen into your lungs.  Follow these instructions at home:  Medicines  · Take over-the-counter and prescription medicines only as told by your health care provider. It is important to use correct technique with inhaled medicines.  · If you were prescribed an antibiotic medicine or oral steroid, take it as told by your health care provider. Do not stop taking the medicine even if you start to feel better.  Lifestyle  · Eat a healthy diet.  · Exercise regularly.  · Get plenty of sleep.  · Avoid exposure to all substances that irritate the airway, especially to tobacco smoke.  · Wash your hands often with soap and water to reduce the risk of infection. If soap and water are not available, use hand .  · During flu season, avoid enclosed spaces that are crowded with people.  General instructions  · Drink enough fluid to keep your urine clear or pale yellow (unless you have a medical condition that requires fluid restriction).  · Use a cool mist vaporizer. This  humidifies the air and makes it easier for you to clear your chest when you cough.  · If you have a home nebulizer and oxygen, continue to use them as told by your health care provider.  · Keep all follow-up visits as told by your health care provider. This is important.  How is this prevented?  · Stay up-to-date on pneumococcal and influenza (flu) vaccines. A flu shot is recommended every year to help prevent exacerbations.  · Do not use any products that contain nicotine or tobacco, such as cigarettes and e-cigarettes. Quitting smoking is very important in preventing COPD from getting worse and in preventing exacerbations from happening as often. If you need help quitting, ask your health care provider.  · Follow all instructions for pulmonary rehabilitation after a recent exacerbation. This can help prevent future exacerbations.  · Work with your health care provider to develop and follow an action plan. This tells you what steps to take when you experience certain symptoms.  Contact a health care provider if:  · You have a worsening of your regular COPD symptoms.  Get help right away if:  · You have worsening shortness of breath, even when resting.  · You have trouble talking.  · You have severe chest pain.  · You cough up blood.  · You have a fever.  · You have weakness, vomit repeatedly, or faint.  · You feel confused.  · You are not able to sleep because of your symptoms.  · You have trouble doing daily activities.  Summary  · COPD exacerbations are episodes when breathing symptoms become much worse and require extra treatment above your normal treatment.  · Exacerbations can be severe and even life threatening. Frequent COPD exacerbations can cause further damage to your lungs.  · COPD exacerbations are usually triggered by infections such as the flu, colds, and even pneumonia.  · Treatment for this condition depends on the severity and cause of the symptoms. You may need to be admitted to a hospital for  treatment.  · Quitting smoking is very important to prevent COPD from getting worse and to prevent exacerbations from happening as often.  This information is not intended to replace advice given to you by your health care provider. Make sure you discuss any questions you have with your health care provider.  Document Revised: 11/30/2018 Document Reviewed: 01/22/2018  [employer] Patient Education © 2021 [employer]

## 2021-09-28 NOTE — PROGRESS NOTES
Primary Care Provider  Sam Hampton Jr., MD     Referring Provider  No ref. provider found     Chief Complaint  COPD, Lung Nodule, and Follow-up    Subjective          History of Presenting Illness  Patient is a 76-year-old female, patient of Dr. 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  Patient also has a history of COPD.  Patient is here for follow-up visit today.  Patient's  is present with patient in the office today.  Patient did have a repeat CAT scan of the lung in Kadie 15, 2020.  This does show noncalcified right middle lobe nodule.  A PET scan was subsequently ordered on 7/20/2020 which showed the nodule to be decreasing in size not associated with any hypermetabolic activity.  Patient reported that she had exposure to tuberculosis as a 4-year-old child from her father who ultimately succumbed to the infection.  Patient has had multiple PPD that were positive since that time but screening chest x-rays were all negative.  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  Patient was prescribed for Voriconazole however was having visual hallucinations so this medication was discontinued.  Patient was seen by infectious disease specialist Dr. Cartagena who ordered a repeat CT scan completed back in January 2021.  Chest CT report reported findings of ill-defined areas of increased density in the right upper lobe with some apparent chain sutures. This may all be related to postoperative scar. Report also stated scattered minimal ill-defined areas of increased density in the bilateral lungs including a few tiny sub-5 mm nodules. These may all be inflammatory in nature recommending a 4 month follow chest CT. Patient denies dyspnea, cough, wheezing, fever, chills, night sweats, swollen glands in the head and neck, unintentional weight loss, hemoptysis, purulent sputum production, dysphagia, chest pain, palpitations, chest  tightness, abdominal pain, nausea, vomiting, and diarrhea.  Patient also denies any myalgias, changes in sense of taste and/or smell, sore throat, any other coronavirus or flu-like symptoms.  Patient denies any leg swelling, orthopnea, paroxysmal nocturnal dyspnea.  Patient is able to perform activities of daily living.        Review of Systems   Constitutional: Negative for activity change, appetite change, chills, diaphoresis, fatigue, fever, unexpected weight gain and unexpected weight loss.        Negative for Insomnia   HENT: Negative for congestion (Nasal), mouth sores, nosebleeds, postnasal drip, sore throat, swollen glands and trouble swallowing.         Negative for Thrush  Negative for Hoarseness  Negative for Allergies/Hay Fever  Negative for Recent Head injury  Negative for Ear Fullness  Negative for Nasal or Sinus pain  Negative for Dry lips  Negative for Nasal discharge   Respiratory: Negative for apnea, cough, chest tightness, shortness of breath and wheezing.         Negative for Hemoptysis  Negative for Pleuritic pain   Cardiovascular: Negative for chest pain, palpitations and leg swelling.        Negative for Claudication  Negative for Cyanosis  Negative for Dyspnea on exertion   Gastrointestinal: Negative for abdominal pain, diarrhea, nausea, vomiting and GERD.   Musculoskeletal: Negative for joint swelling and myalgias.        Negative for Joint pain  Negative for Joint stiffness   Skin: Negative for color change, dry skin, pallor and rash.   Neurological: Negative for syncope, weakness and headache.   Hematological: Negative for adenopathy. Does not bruise/bleed easily.        Family History   Problem Relation Age of Onset   • Cancer Mother    • Diabetes Mother    • Heart failure Mother    • Cancer Brother         Social History     Socioeconomic History   • Marital status:      Spouse name: Not on file   • Number of children: Not on file   • Years of education: Not on file   • Highest  education level: Not on file   Tobacco Use   • Smoking status: Former Smoker     Quit date: 1990     Years since quittin.7   • Smokeless tobacco: Never Used   Vaping Use   • Vaping Use: Never used   Substance and Sexual Activity   • Alcohol use: Defer   • Drug use: Defer   • Sexual activity: Defer        Past Medical History:   Diagnosis Date   • COPD (chronic obstructive pulmonary disease) (CMS/HCC)    • Lung nodule           There is no immunization history on file for this patient.    No Known Allergies       Current Outpatient Medications:   •  albuterol sulfate  (90 Base) MCG/ACT inhaler, Inhale 2 puffs Every 4 (Four) Hours As Needed., Disp: , Rfl:   •  aspirin 81 MG chewable tablet, Chew 81 mg Daily., Disp: , Rfl:   •  Cetirizine HCl 10 MG capsule, Take  by mouth., Disp: , Rfl:   •  Cholecalciferol (Vitamin D3) 250 MCG (45074 UT) tablet, Take  by mouth., Disp: , Rfl:   •  Diclofenac Sodium (VOLTAREN) 1 % gel gel, diclofenac sodium 1 % topical gel apply to the affected area(s) by topical route 2 times per day 2021  Active, Disp: , Rfl:   •  hydroCHLOROthiazide (HYDRODIURIL) 25 MG tablet, Take 1 tablet by mouth Daily., Disp: 90 tablet, Rfl: 2  •  metoprolol succinate XL (Toprol XL) 50 MG 24 hr tablet, Take 1 tablet by mouth Daily., Disp: 90 tablet, Rfl: 3  •  ondansetron (Zofran) 4 MG tablet, Zofran 4 mg oral tablet take 1 tablet by oral route every 6 hours as needed PRN 2021  Active, Disp: , Rfl:   •  rosuvastatin (CRESTOR) 40 MG tablet, Take 1 tablet by mouth Every Night., Disp: 90 tablet, Rfl: 3  •  Trelegy Ellipta 100-62.5-25 MCG/INH inhaler, Inhale 1 puff Daily for 90 days. Rinse mouth out after each use., Disp: 3 each, Rfl: 3  •  multivitamin with minerals tablet tablet, Take 1 tablet by mouth Daily., Disp: , Rfl:      Objective     Physical Exam  Vital Signs Reviewed  WDWN, Alert, NAD.    HEENT:  PERRL, EOMI.  OP, nares clear, no sinus tenderness  Neck:  Supple, no JVD, no  "thyromegaly.  Lymph: no axillary, cervical, supraclavicular lymphadenopathy noted bilaterally  Chest:  good aeration, clear to auscultation bilaterally, tympanic to percussion bilaterally, no work of breathing noted  CV: RRR, no MGR, pulses 2+, equal.  Abd:  Soft, NT, ND, + BS, no HSM  EXT:  no clubbing, no cyanosis, no edema, no joint tenderness  Neuro:  A&Ox3, CN grossly intact, no focal deficits.  Skin: No rashes or lesions noted.      Vital Signs:   /78   Pulse 60   Temp 97.5 °F (36.4 °C)   Resp 14   Ht 162.6 cm (64\")   Wt 59 kg (130 lb)   SpO2 95% Comment: room air  BMI 22.31 kg/m²         Result Review :   I have personally reviewed DMITRIY Oquendo last office visit note.          Assessment and Plan      Assessment:  1. Fungal infection of the lung.  Pt seen infectious disease specialist Dr. Cartagena.   2. Right middle lobe nodule decreasing in size, felt to be infectious in nature.      3. Family history with first degree relative with active TB and father who  from tuberculosis.      4.  History of fungal pneumonia requiring wedge resection of the lung.    5. Mild COPD with severely decreased diffusion capacity on PFTs.    6. Chronic dyspnea.      7. Tobacco abuse of cigarettes in remission.           Plan:  1.    We will repeat chest CT scan.  Order placed today.    2.     Patient to continue Trelegy Ellipta inhaler as prescribed and rinse mouth out after each use.  Continue albuterol inhaler as needed.  3.    Patient reports they are up-to-date with pneumonia and Covid vaccines.  Patient declines flu vaccine today stating that she is going to be leaving on vacation for [place] and like to receive the flu vaccine when she gets back.  Patient is advised to continue to follow CDC recommendations such as social distancing wearing a mask and washing hands for at least 20 seconds.   4.  Patient to call the office, 911, or go to the ER with new or worsening symptoms.  5.  Follow-up with Dr." Wolfing as scheduled.  6.  Follow up in 6 weeks, sooner if needed.          Follow Up   Return in about 6 weeks (around 11/11/2021) for with any MD. Pt must see MD.  Patient was given instructions and counseling regarding her condition or for health maintenance advice. Please see specific information pulled into the AVS if appropriate.

## 2021-09-30 ENCOUNTER — OFFICE VISIT (OUTPATIENT)
Dept: PULMONOLOGY | Facility: CLINIC | Age: 76
End: 2021-09-30

## 2021-09-30 VITALS
RESPIRATION RATE: 14 BRPM | TEMPERATURE: 97.5 F | HEIGHT: 64 IN | WEIGHT: 130 LBS | OXYGEN SATURATION: 95 % | BODY MASS INDEX: 22.2 KG/M2 | SYSTOLIC BLOOD PRESSURE: 135 MMHG | DIASTOLIC BLOOD PRESSURE: 78 MMHG | HEART RATE: 60 BPM

## 2021-09-30 DIAGNOSIS — J44.9 CHRONIC OBSTRUCTIVE PULMONARY DISEASE, UNSPECIFIED COPD TYPE (HCC): ICD-10-CM

## 2021-09-30 DIAGNOSIS — Z86.19 HISTORY OF FUNGAL INFECTION: Primary | ICD-10-CM

## 2021-09-30 DIAGNOSIS — F17.201 TOBACCO ABUSE, IN REMISSION: ICD-10-CM

## 2021-09-30 DIAGNOSIS — R91.1 LUNG NODULE: ICD-10-CM

## 2021-09-30 DIAGNOSIS — R06.09 CHRONIC DYSPNEA: ICD-10-CM

## 2021-09-30 PROBLEM — R06.02 SHORTNESS OF BREATH: Status: ACTIVE | Noted: 2021-09-30

## 2021-09-30 PROBLEM — D50.9 ANEMIA, IRON DEFICIENCY: Status: ACTIVE | Noted: 2019-04-18

## 2021-09-30 PROBLEM — J45.909 ASTHMA: Status: ACTIVE | Noted: 2018-05-29

## 2021-09-30 PROBLEM — K80.20 GALL STONES: Status: ACTIVE | Noted: 2021-09-30

## 2021-09-30 PROBLEM — K21.9 ACID REFLUX: Status: ACTIVE | Noted: 2018-05-29

## 2021-09-30 PROCEDURE — 99213 OFFICE O/P EST LOW 20 MIN: CPT | Performed by: NURSE PRACTITIONER

## 2021-09-30 RX ORDER — ALBUTEROL SULFATE 90 UG/1
2 AEROSOL, METERED RESPIRATORY (INHALATION) EVERY 4 HOURS PRN
COMMUNITY
End: 2022-07-12 | Stop reason: SDUPTHER

## 2021-09-30 RX ORDER — MELOXICAM 15 MG/1
TABLET ORAL
COMMUNITY
End: 2021-09-30

## 2021-09-30 RX ORDER — ACLIDINIUM BROMIDE 400 UG/1
POWDER, METERED RESPIRATORY (INHALATION)
COMMUNITY
End: 2021-09-30

## 2021-09-30 RX ORDER — NITROGLYCERIN 0.4 MG/1
TABLET SUBLINGUAL
COMMUNITY
End: 2021-09-30

## 2021-09-30 RX ORDER — ONDANSETRON 4 MG/1
TABLET, FILM COATED ORAL
COMMUNITY
Start: 2021-02-08 | End: 2022-07-12

## 2021-09-30 RX ORDER — ATORVASTATIN CALCIUM 20 MG/1
TABLET, FILM COATED ORAL
COMMUNITY
End: 2021-09-30 | Stop reason: ALTCHOICE

## 2021-10-12 ENCOUNTER — HOSPITAL ENCOUNTER (OUTPATIENT)
Dept: CT IMAGING | Facility: HOSPITAL | Age: 76
Discharge: HOME | End: 2021-10-12
Admitting: NURSE PRACTITIONER

## 2021-10-12 DIAGNOSIS — R91.1 LUNG NODULE: ICD-10-CM

## 2021-10-12 DIAGNOSIS — F17.201 TOBACCO ABUSE, IN REMISSION: ICD-10-CM

## 2021-10-12 DIAGNOSIS — R06.09 CHRONIC DYSPNEA: ICD-10-CM

## 2021-10-12 DIAGNOSIS — Z86.19 HISTORY OF FUNGAL INFECTION: ICD-10-CM

## 2021-10-12 DIAGNOSIS — J44.9 CHRONIC OBSTRUCTIVE PULMONARY DISEASE, UNSPECIFIED COPD TYPE (HCC): ICD-10-CM

## 2021-10-12 PROCEDURE — 71250 CT THORAX DX C-: CPT

## 2021-10-18 RX ORDER — ESOMEPRAZOLE MAGNESIUM 40 MG/1
40 CAPSULE, DELAYED RELEASE ORAL
Qty: 90 CAPSULE | Refills: 2 | Status: SHIPPED | OUTPATIENT
Start: 2021-10-18 | End: 2022-07-13

## 2021-10-20 DIAGNOSIS — R91.8 LUNG NODULES: ICD-10-CM

## 2021-10-20 DIAGNOSIS — R91.8 OPACITY OF LUNG ON IMAGING STUDY: Primary | ICD-10-CM

## 2021-11-01 ENCOUNTER — OFFICE VISIT (OUTPATIENT)
Dept: INTERNAL MEDICINE | Facility: CLINIC | Age: 76
End: 2021-11-01

## 2021-11-01 VITALS
HEIGHT: 64 IN | DIASTOLIC BLOOD PRESSURE: 72 MMHG | HEART RATE: 53 BPM | BODY MASS INDEX: 23.22 KG/M2 | SYSTOLIC BLOOD PRESSURE: 175 MMHG | OXYGEN SATURATION: 92 % | TEMPERATURE: 94.2 F | WEIGHT: 136 LBS

## 2021-11-01 DIAGNOSIS — Z23 NEED FOR PNEUMOCOCCAL VACCINATION: ICD-10-CM

## 2021-11-01 DIAGNOSIS — R05.9 COUGH: ICD-10-CM

## 2021-11-01 DIAGNOSIS — J44.9 CHRONIC OBSTRUCTIVE PULMONARY DISEASE, UNSPECIFIED COPD TYPE (HCC): ICD-10-CM

## 2021-11-01 DIAGNOSIS — Z12.31 ENCOUNTER FOR SCREENING MAMMOGRAM FOR MALIGNANT NEOPLASM OF BREAST: ICD-10-CM

## 2021-11-01 DIAGNOSIS — I10 PRIMARY HYPERTENSION: Primary | ICD-10-CM

## 2021-11-01 DIAGNOSIS — N95.1 POSTMENOPAUSAL SYNDROME: ICD-10-CM

## 2021-11-01 PROCEDURE — 90732 PPSV23 VACC 2 YRS+ SUBQ/IM: CPT | Performed by: INTERNAL MEDICINE

## 2021-11-01 PROCEDURE — 99214 OFFICE O/P EST MOD 30 MIN: CPT | Performed by: INTERNAL MEDICINE

## 2021-11-01 PROCEDURE — G0009 ADMIN PNEUMOCOCCAL VACCINE: HCPCS | Performed by: INTERNAL MEDICINE

## 2021-11-01 RX ORDER — SPIRONOLACTONE AND HYDROCHLOROTHIAZIDE 25; 25 MG/1; MG/1
1 TABLET ORAL DAILY
Qty: 90 TABLET | Refills: 1 | Status: SHIPPED | OUTPATIENT
Start: 2021-11-01 | End: 2022-04-14

## 2021-11-01 RX ORDER — AZITHROMYCIN 250 MG/1
TABLET, FILM COATED ORAL
Qty: 6 TABLET | Refills: 0 | Status: SHIPPED | OUTPATIENT
Start: 2021-11-01 | End: 2021-11-29

## 2021-11-01 NOTE — PROGRESS NOTES
"Chief Complaint  Annual Exam (talk about trelegy   lung scan?)    Subjective          Lianne Bardales presents to [facility]  History of Present Illness  Pt reports h/o abnormal stress test as well as elevated coronary calcium score. Pt is on toprol per cardiology for this.   COPD- At baseline on trelegy. Pt reports having thrush intermittently. Pt has been off inhaler for a couple weeks.   Pt reports having a cough since having bonfire approx 1 week ago. Pt started on her allergy meds with some help.   Elevated BP- pt without BP readings at home. Pt reports noncompliance of HCTZ recently.   HLD- doing well on statin.  Chest CT being done again in 3 months.  Due for ppsv23  Due for covid booster  Due for DEXA and mammogram  Had Cscope in last 2-3 years at [facility].     Objective   Vital Signs:   /72 (BP Location: Left arm, Patient Position: Sitting, Cuff Size: Adult)   Pulse 53   Temp 94.2 °F (34.6 °C) (Temporal)   Ht 162.6 cm (64\")   Wt 61.7 kg (136 lb)   SpO2 92%   BMI 23.34 kg/m²     Wt Readings from Last 3 Encounters:   11/01/21 61.7 kg (136 lb)   09/30/21 59 kg (130 lb)   03/08/21 62.3 kg (137 lb 6.4 oz)     BP Readings from Last 3 Encounters:   11/01/21 175/72   09/30/21 135/78   03/08/21 117/75       Physical Exam   Appearance: No acute distress, well-nourished  Head: normocephalic, atraumatic  Eyes: extraocular movements intact, no scleral icterus, no conjunctival injection  Ears, Nose, and Throat: external ears normal, nares patent, moist mucous membranes  Cardiovascular: regular rate and rhythm. no murmurs, rales, or rhonchi. no edema  Respiratory: breathing comfortably, symmetric chest rise, clear to auscultation bilaterally. No wheezes, rales, or rhonchi.  Neuro: alert and oriented to time, place, and person. Normal gait  Psych: normal mood and affect     Result Review :   The following data was reviewed by: Sam Mendez " Jr Hampton MD on 11/01/2021:  Common labs    Common Labsle 2/2/21   Glucose 92   BUN 17   Creatinine 0.86   Sodium 141   Potassium 4.4   Chloride 105   Calcium 9.3   Albumin 4.2   Total Bilirubin 0.40   Alkaline Phosphatase 133   AST (SGOT) 31   ALT (SGPT) 24   WBC 5.22   Hemoglobin 13.9   Hematocrit 42.0   Platelets 269   Total Cholesterol 136   Triglycerides 81   HDL Cholesterol 71 (A)   LDL Cholesterol  49 (A)   (A) Abnormal value       Comments are available for some flowsheets but are not being displayed.           Assessment and Plan    Diagnoses and all orders for this visit:    1. Primary hypertension (Primary)  Comments:  add aldactone for more optimal control. f/u in3 weeks for repeat eval.   Orders:  -     spironolactone-hydrochlorothiazide (Aldactazide) 25-25 MG tablet; Take 1 tablet by mouth Daily.  Dispense: 90 tablet; Refill: 1    2. Need for pneumococcal vaccination  -     Pneumococcal Polysaccharide Vaccine 23-Valent (PPSV23) Greater Than or Equal To 1yo Subcutaneous / IM    3. Cough  Comments:  given duration of symptoms, will Rx azithromycin. encouraged inhaler ocmpliance. call or RTC with any concerns.   Orders:  -     azithromycin (Zithromax Z-Devin) 250 MG tablet; Take 2 tablets by mouth on day 1, then 1 tablet daily on days 2-5  Dispense: 6 tablet; Refill: 0    4. Encounter for screening mammogram for malignant neoplasm of breast  -     Mammo Screening Bilateral With CAD; Future    5. Postmenopausal syndrome  -     DEXA Bone Density Axial    6. Chronic obstructive pulmonary disease, unspecified COPD type (HCC)  Comments:  upcoming appt with pulm and may discussed need for inhaled steroid as pt suffers from recurrent thrush.         Follow Up   Return in about 3 weeks (around 11/22/2021) for Recheck.  Patient was given instructions and counseling regarding her condition or for health maintenance advice. Please see specific information pulled into the AVS if appropriate.

## 2021-11-17 ENCOUNTER — OFFICE VISIT (OUTPATIENT)
Dept: PULMONOLOGY | Facility: CLINIC | Age: 76
End: 2021-11-17

## 2021-11-17 VITALS
OXYGEN SATURATION: 98 % | RESPIRATION RATE: 14 BRPM | WEIGHT: 130 LBS | BODY MASS INDEX: 22.2 KG/M2 | TEMPERATURE: 98.6 F | HEIGHT: 64 IN

## 2021-11-17 DIAGNOSIS — R06.09 CHRONIC DYSPNEA: ICD-10-CM

## 2021-11-17 DIAGNOSIS — J44.9 CHRONIC OBSTRUCTIVE PULMONARY DISEASE, UNSPECIFIED COPD TYPE (HCC): ICD-10-CM

## 2021-11-17 DIAGNOSIS — R91.8 LUNG NODULES: Primary | ICD-10-CM

## 2021-11-17 PROCEDURE — 99214 OFFICE O/P EST MOD 30 MIN: CPT | Performed by: INTERNAL MEDICINE

## 2021-11-17 NOTE — PROGRESS NOTES
Pulmonary Consultation    No ref. provider found,    Thank you for asking me to see Lianne Bardales for   Chief Complaint   Patient presents with   • COPD   • Follow-up   • Thrush     mouth sore, red   .      History of Present Illness  Lianne Bardales is a 76 y.o. female with a PMH significant for COPD and lung nodules presents for follow-up patient has been having recurrent thrush and is unable to tolerate her Trelegy and has discontinued it on its own and wants to be prescribed some other medication she does complain of dyspnea on exertion and occasional cough with wheeze but no significant expectoration or chest pain at this time and seems to be fairly stable she has had a CAT scan of her chest done for follow-up lung nodules she denies any weight loss or hemoptysis       Tobacco use history: Previous smoker    Review of Systems: History obtained from chart review and the patient.  Review of Systems   HENT: Positive for mouth sores.    All other systems reviewed and are negative.    As described in the HPI. Otherwise, remainder of ROS (14 systems) were negative.    Patient Active Problem List   Diagnosis   • Acid reflux   • Anemia, iron deficiency   • Asthma   • COPD (chronic obstructive pulmonary disease) (HCC)   • Gall stones   • Chronic dyspnea   • History of fungal infection   • Lung nodule   • Tobacco abuse, in remission         Current Outpatient Medications:   •  albuterol sulfate  (90 Base) MCG/ACT inhaler, Inhale 2 puffs Every 4 (Four) Hours As Needed., Disp: , Rfl:   •  aspirin 81 MG chewable tablet, Chew 81 mg Daily., Disp: , Rfl:   •  Cetirizine HCl 10 MG capsule, Take  by mouth., Disp: , Rfl:   •  Cholecalciferol (Vitamin D3) 250 MCG (56739 UT) tablet, Take  by mouth., Disp: , Rfl:   •  Diclofenac Sodium (VOLTAREN) 1 % gel gel, diclofenac sodium 1 % topical gel apply to the affected area(s) by topical route 2 times per day 2/8/2021  Active, Disp: , Rfl:   •  esomeprazole (nexIUM)  "40 MG capsule, Take 1 capsule by mouth Every Morning Before Breakfast., Disp: 90 capsule, Rfl: 2  •  metoprolol succinate XL (Toprol XL) 50 MG 24 hr tablet, Take 1 tablet by mouth Daily., Disp: 90 tablet, Rfl: 3  •  multivitamin with minerals tablet tablet, Take 1 tablet by mouth Daily., Disp: , Rfl:   •  ondansetron (Zofran) 4 MG tablet, Zofran 4 mg oral tablet take 1 tablet by oral route every 6 hours as needed PRN 2021  Active, Disp: , Rfl:   •  rosuvastatin (CRESTOR) 40 MG tablet, Take 1 tablet by mouth Every Night., Disp: 90 tablet, Rfl: 3  •  spironolactone-hydrochlorothiazide (Aldactazide) 25-25 MG tablet, Take 1 tablet by mouth Daily., Disp: 90 tablet, Rfl: 1  •  azithromycin (Zithromax Z-Devin) 250 MG tablet, Take 2 tablets by mouth on day 1, then 1 tablet daily on days 2-5, Disp: 6 tablet, Rfl: 0  •  umeclidinium-vilanterol (ANORO ELLIPTA) 62.5-25 MCG/INH aerosol powder  inhaler, Inhale 1 puff Daily., Disp: 1 each, Rfl: 11    No Known Allergies    Past Medical History:   Diagnosis Date   • COPD (chronic obstructive pulmonary disease) (HCC)    • Lung nodule      Past Surgical History:   Procedure Laterality Date   • FINGER SURGERY      trigger thumb   • FOOT SURGERY     • GALLBLADDER SURGERY      gallstones pt is unsure if they removed galbladder   • HYSTERECTOMY     • LUNG BIOPSY       Social History     Socioeconomic History   • Marital status:    Tobacco Use   • Smoking status: Former Smoker     Quit date: 1990     Years since quittin.8   • Smokeless tobacco: Never Used   Vaping Use   • Vaping Use: Never used   Substance and Sexual Activity   • Alcohol use: Defer   • Drug use: Defer   • Sexual activity: Defer     Family History   Problem Relation Age of Onset   • Cancer Mother    • Diabetes Mother    • Heart failure Mother    • Cancer Brother           Objective     Temperature 98.6 °F (37 °C), temperature source Tympanic, resp. rate 14, height 162.6 cm (64\"), weight 59 kg (130 lb), " SpO2 98 %, not currently breastfeeding.  Physical Exam  Vitals and nursing note reviewed.   Constitutional:       Appearance: Normal appearance.   HENT:      Head: Normocephalic and atraumatic.      Nose: Nose normal.      Mouth/Throat:      Mouth: Mucous membranes are moist.   Eyes:      Extraocular Movements: Extraocular movements intact.      Pupils: Pupils are equal, round, and reactive to light.   Cardiovascular:      Rate and Rhythm: Normal rate and regular rhythm.      Pulses: Normal pulses.      Heart sounds: Normal heart sounds.   Pulmonary:      Effort: Pulmonary effort is normal.      Breath sounds: Rhonchi present.   Abdominal:      General: Abdomen is flat.      Palpations: Abdomen is soft.   Musculoskeletal:         General: Normal range of motion.      Cervical back: Normal range of motion and neck supple.   Skin:     General: Skin is warm.      Capillary Refill: Capillary refill takes less than 2 seconds.   Neurological:      General: No focal deficit present.      Mental Status: She is alert and oriented to person, place, and time.   Psychiatric:         Mood and Affect: Mood normal.         Radiology (independently reviewed and interpreted by me): CT scan reviewed with the patient shows significant improvement and scarring with emphysema       Assessment/Plan     Diagnoses and all orders for this visit:    1. Lung nodules (Primary)    2. Chronic obstructive pulmonary disease, unspecified COPD type (HCC)    3. Chronic dyspnea    Other orders  -     umeclidinium-vilanterol (ANORO ELLIPTA) 62.5-25 MCG/INH aerosol powder  inhaler; Inhale 1 puff Daily.  Dispense: 1 each; Refill: 11         Discussion/ Recommendations:   Patient has already quit smoking and seems to be doing fairly good she is having significant thrush recurrent from Trelegy we will therefore start her on Anoro once daily she has already been scheduled for a repeat CT scan for follow-up of lung nodules    Patient's Body mass index is  22.31 kg/m². indicating that she is within normal range (BMI 18.5-24.9). No BMI management plan needed..           Return in about 3 months (around 2/17/2022).      Thank you for allowing me to participate in the care of Lianne Bardales. Please do not hesitate to contact me with any questions.         This document has been electronically signed by Mathew Mcgovern MD on November 17, 2021 11:22 EST

## 2021-11-29 ENCOUNTER — OFFICE VISIT (OUTPATIENT)
Dept: INTERNAL MEDICINE | Facility: CLINIC | Age: 76
End: 2021-11-29

## 2021-11-29 VITALS
BODY MASS INDEX: 23.12 KG/M2 | HEART RATE: 63 BPM | TEMPERATURE: 97.4 F | DIASTOLIC BLOOD PRESSURE: 84 MMHG | HEIGHT: 64 IN | WEIGHT: 135.4 LBS | SYSTOLIC BLOOD PRESSURE: 128 MMHG | OXYGEN SATURATION: 94 %

## 2021-11-29 DIAGNOSIS — R91.1 LUNG NODULE: ICD-10-CM

## 2021-11-29 DIAGNOSIS — I10 PRIMARY HYPERTENSION: Primary | ICD-10-CM

## 2021-11-29 DIAGNOSIS — J44.9 CHRONIC OBSTRUCTIVE PULMONARY DISEASE, UNSPECIFIED COPD TYPE (HCC): ICD-10-CM

## 2021-11-29 DIAGNOSIS — E78.49 OTHER HYPERLIPIDEMIA: ICD-10-CM

## 2021-11-29 LAB
ALBUMIN SERPL-MCNC: 4.5 G/DL (ref 3.5–5.2)
ALBUMIN/GLOB SERPL: 1.6 G/DL
ALP SERPL-CCNC: 81 U/L (ref 39–117)
ALT SERPL W P-5'-P-CCNC: 29 U/L (ref 1–33)
ANION GAP SERPL CALCULATED.3IONS-SCNC: 6.2 MMOL/L (ref 5–15)
AST SERPL-CCNC: 35 U/L (ref 1–32)
BASOPHILS # BLD AUTO: 0.06 10*3/MM3 (ref 0–0.2)
BASOPHILS NFR BLD AUTO: 0.8 % (ref 0–1.5)
BILIRUB SERPL-MCNC: 0.5 MG/DL (ref 0–1.2)
BUN SERPL-MCNC: 16 MG/DL (ref 8–23)
BUN/CREAT SERPL: 18.4 (ref 7–25)
CALCIUM SPEC-SCNC: 9.6 MG/DL (ref 8.6–10.5)
CHLORIDE SERPL-SCNC: 102 MMOL/L (ref 98–107)
CHOLEST SERPL-MCNC: 179 MG/DL (ref 0–200)
CO2 SERPL-SCNC: 28.8 MMOL/L (ref 22–29)
CREAT SERPL-MCNC: 0.87 MG/DL (ref 0.57–1)
DEPRECATED RDW RBC AUTO: 44.2 FL (ref 37–54)
EOSINOPHIL # BLD AUTO: 0.14 10*3/MM3 (ref 0–0.4)
EOSINOPHIL NFR BLD AUTO: 1.9 % (ref 0.3–6.2)
ERYTHROCYTE [DISTWIDTH] IN BLOOD BY AUTOMATED COUNT: 12.3 % (ref 12.3–15.4)
GFR SERPL CREATININE-BSD FRML MDRD: 63 ML/MIN/1.73
GLOBULIN UR ELPH-MCNC: 2.9 GM/DL
GLUCOSE SERPL-MCNC: 93 MG/DL (ref 65–99)
HCT VFR BLD AUTO: 43.7 % (ref 34–46.6)
HDLC SERPL-MCNC: 74 MG/DL (ref 40–60)
HGB BLD-MCNC: 14.8 G/DL (ref 12–15.9)
IMM GRANULOCYTES # BLD AUTO: 0.02 10*3/MM3 (ref 0–0.05)
IMM GRANULOCYTES NFR BLD AUTO: 0.3 % (ref 0–0.5)
LDLC SERPL CALC-MCNC: 83 MG/DL (ref 0–100)
LDLC/HDLC SERPL: 1.07 {RATIO}
LYMPHOCYTES # BLD AUTO: 1.48 10*3/MM3 (ref 0.7–3.1)
LYMPHOCYTES NFR BLD AUTO: 20.4 % (ref 19.6–45.3)
MCH RBC QN AUTO: 33.1 PG (ref 26.6–33)
MCHC RBC AUTO-ENTMCNC: 33.9 G/DL (ref 31.5–35.7)
MCV RBC AUTO: 97.8 FL (ref 79–97)
MONOCYTES # BLD AUTO: 0.78 10*3/MM3 (ref 0.1–0.9)
MONOCYTES NFR BLD AUTO: 10.8 % (ref 5–12)
NEUTROPHILS NFR BLD AUTO: 4.76 10*3/MM3 (ref 1.7–7)
NEUTROPHILS NFR BLD AUTO: 65.8 % (ref 42.7–76)
NRBC BLD AUTO-RTO: 0 /100 WBC (ref 0–0.2)
PLATELET # BLD AUTO: 247 10*3/MM3 (ref 140–450)
PMV BLD AUTO: 9.7 FL (ref 6–12)
POTASSIUM SERPL-SCNC: 4.3 MMOL/L (ref 3.5–5.2)
PROT SERPL-MCNC: 7.4 G/DL (ref 6–8.5)
RBC # BLD AUTO: 4.47 10*6/MM3 (ref 3.77–5.28)
SODIUM SERPL-SCNC: 137 MMOL/L (ref 136–145)
TRIGL SERPL-MCNC: 129 MG/DL (ref 0–150)
VLDLC SERPL-MCNC: 22 MG/DL (ref 5–40)
WBC NRBC COR # BLD: 7.24 10*3/MM3 (ref 3.4–10.8)

## 2021-11-29 PROCEDURE — 85025 COMPLETE CBC W/AUTO DIFF WBC: CPT | Performed by: INTERNAL MEDICINE

## 2021-11-29 PROCEDURE — 36415 COLL VENOUS BLD VENIPUNCTURE: CPT | Performed by: INTERNAL MEDICINE

## 2021-11-29 PROCEDURE — 80061 LIPID PANEL: CPT | Performed by: INTERNAL MEDICINE

## 2021-11-29 PROCEDURE — 80053 COMPREHEN METABOLIC PANEL: CPT | Performed by: INTERNAL MEDICINE

## 2021-11-29 PROCEDURE — 99214 OFFICE O/P EST MOD 30 MIN: CPT | Performed by: INTERNAL MEDICINE

## 2021-11-29 NOTE — PROGRESS NOTES
"Chief Complaint  Follow-up, Hypertension, and labs needed (pt states you had mention them last time )    Subjective          Lianne Bardales presents to [facility]  History of Present Illness  hypertension- patient denies Has, dizziness, chest pain. Patient has Blood Pressure cuff at home, but has not checked recently.   COPD- patient has switched to anoro. Patient reports breathing is doing better than previously with trelegy.   Cscope done in last 2 years at [place].     Objective   Vital Signs:   /84 (BP Location: Left arm, Patient Position: Sitting, Cuff Size: Adult)   Pulse 63   Temp 97.4 °F (36.3 °C) (Temporal)   Ht 162.6 cm (64\")   Wt 61.4 kg (135 lb 6.4 oz)   SpO2 94%   BMI 23.24 kg/m²     Wt Readings from Last 3 Encounters:   11/29/21 61.4 kg (135 lb 6.4 oz)   11/17/21 59 kg (130 lb)   11/01/21 61.7 kg (136 lb)     BP Readings from Last 3 Encounters:   11/29/21 128/84   11/01/21 175/72   09/30/21 135/78     Physical Exam   Appearance: No acute distress, well-nourished  Head: normocephalic, atraumatic  Eyes: extraocular movements intact, no scleral icterus, no conjunctival injection  Ears, Nose, and Throat: external ears normal, nares patent, moist mucous membranes  Cardiovascular: regular rate and rhythm. no murmurs, rales, or rhonchi. no edema  Respiratory: breathing comfortably, symmetric chest rise, clear to auscultation bilaterally. No wheezes, rales, or rhonchi.  Neuro: alert and oriented to time, place, and person. Normal gait  Psych: normal mood and affect     Result Review :   The following data was reviewed by: Sam Hampton Jr, MD on 11/29/2021:  Common labs    Common Labsle 2/2/21   Glucose 92   BUN 17   Creatinine 0.86   Sodium 141   Potassium 4.4   Chloride 105   Calcium 9.3   Albumin 4.2   Total Bilirubin 0.40   Alkaline Phosphatase 133   AST (SGOT) 31   ALT (SGPT) 24   WBC 5.22   Hemoglobin 13.9   Hematocrit 42.0 "   Platelets 269   Total Cholesterol 136   Triglycerides 81   HDL Cholesterol 71 (A)   LDL Cholesterol  49 (A)   (A) Abnormal value       Comments are available for some flowsheets but are not being displayed.           Lab Results   Component Value Date    COVID19 NOT DETECTED 08/21/2020       Current Outpatient Medications   Medication Instructions   • albuterol sulfate  (90 Base) MCG/ACT inhaler 2 puffs, Inhalation, Every 4 Hours PRN   • aspirin 81 mg, Oral, Daily   • Cetirizine HCl 10 MG capsule Oral   • Cholecalciferol (Vitamin D3) 250 MCG (19605 UT) tablet Oral   • Diclofenac Sodium (VOLTAREN) 1 % gel gel diclofenac sodium 1 % topical gel apply to the affected area(s) by topical route 2 times per day 2/8/2021  Active   • esomeprazole (NEXIUM) 40 mg, Oral, Every Morning Before Breakfast   • metoprolol succinate XL (TOPROL XL) 50 mg, Oral, Daily   • multivitamin with minerals tablet tablet 1 tablet, Oral, Daily   • ondansetron (Zofran) 4 MG tablet Zofran 4 mg oral tablet take 1 tablet by oral route every 6 hours as needed PRN 2/8/2021  Active   • rosuvastatin (CRESTOR) 40 mg, Oral, Nightly   • spironolactone-hydrochlorothiazide (Aldactazide) 25-25 MG tablet 1 tablet, Oral, Daily   • umeclidinium-vilanterol (ANORO ELLIPTA) 62.5-25 MCG/INH aerosol powder  inhaler 1 puff, Inhalation, Daily          Assessment and Plan    Diagnoses and all orders for this visit:    1. Primary hypertension (Primary)  Comments:  improved control with current regimen. encouraged soha emonitoring. goal BP <130/80.   Orders:  -     Comprehensive Metabolic Panel  -     CBC & Differential    2. Chronic obstructive pulmonary disease, unspecified COPD type (HCC)  Comments:  pt feeling better on anoro. explained that pt should see less thrush on this inhaler.     3. Lung nodule  Comments:  CT scan reviewed with pt. cont f/u with pulm. repeat chest CT ordered.     4. Other hyperlipidemia  Comments:  cont statin. check labs.   Orders:  -      Lipid Panel  -     Comprehensive Metabolic Panel          Follow Up   Return in about 6 months (around 5/29/2022) for Recheck.  Patient was given instructions and counseling regarding her condition or for health maintenance advice. Please see specific information pulled into the AVS if appropriate.

## 2021-12-01 ENCOUNTER — PATIENT ROUNDING (BHMG ONLY) (OUTPATIENT)
Dept: INTERNAL MEDICINE | Facility: CLINIC | Age: 76
End: 2021-12-01

## 2021-12-01 ENCOUNTER — TELEPHONE (OUTPATIENT)
Dept: INTERNAL MEDICINE | Facility: CLINIC | Age: 76
End: 2021-12-01

## 2021-12-01 NOTE — PROGRESS NOTES
December 1, 2021    Hello, may I speak with Lianne Bardales?    My name is PATRICIA ROSAS NRCMA/NRCPT    I am  with [facility]  [facility]  [address]  [place] [address] [phone].    Before we get started may I verify your date of birth? 1945    I am calling to officially welcome you to our practice and ask about your recent visit. Is this a good time to talk?  YES    Tell me about your visit with us. What things went well?  PT(PATIENT) STATES THAT SHE GOT GOOD NEWS, AND HAD A GOOD REPORT FROM HER MOST RECENT VISIT     We're always looking for ways to make our patients' experiences even better. Do you have recommendations on ways we may improve?   PT(PATIENT) STATES SHE JUST HAD TO WAIT A LONG TIME, BUT SHE HAD GOT TO THE OFFICE EARLY. PT(PATIENT) STATES THE LAST TIME SHE WAS HERE SHE WAS IN AND OUT QUICKLY, IT JUST SEEMED BUSY THAT DAY    Overall were you satisfied with your first visit to our practice?   YEAH     I appreciate you taking the time to speak with me today. Is there anything else I can do for you?  NOPE    Thank you, and have a great day.

## 2021-12-01 NOTE — TELEPHONE ENCOUNTER
----- Message from Sam Hampton Jr., MD sent at 12/1/2021  9:57 AM EST -----  Labs overall reassuring. Keep up the good work!

## 2022-01-12 ENCOUNTER — HOSPITAL ENCOUNTER (OUTPATIENT)
Dept: CT IMAGING | Facility: HOSPITAL | Age: 77
Discharge: HOME | End: 2022-01-12
Admitting: NURSE PRACTITIONER

## 2022-01-12 DIAGNOSIS — R91.8 LUNG NODULES: ICD-10-CM

## 2022-01-12 DIAGNOSIS — R91.8 OPACITY OF LUNG ON IMAGING STUDY: ICD-10-CM

## 2022-01-12 PROCEDURE — 71250 CT THORAX DX C-: CPT

## 2022-02-01 ENCOUNTER — TELEPHONE (OUTPATIENT)
Dept: PULMONOLOGY | Facility: CLINIC | Age: 77
End: 2022-02-01

## 2022-02-01 NOTE — TELEPHONE ENCOUNTER
Patient called today requesting results of her Chest CT.Please advise.     Patient also wanted to know if she has an upcoming appointment, I will call and let her know when she is scheduled.

## 2022-02-16 ENCOUNTER — TELEPHONE (OUTPATIENT)
Dept: INTERNAL MEDICINE | Facility: CLINIC | Age: 77
End: 2022-02-16

## 2022-02-16 NOTE — TELEPHONE ENCOUNTER
HUB OK TO READ AND ADVISE    PATIENT IS AWARE SHE HAS OVERDUE MAMMO AND DEXA ORDERS THAT NEED TO BE COMPLETED. PATIENT CAN CONTACT SCHEDULING [phone] OPTION 3 TO SCHEDULE.

## 2022-02-17 ENCOUNTER — OFFICE VISIT (OUTPATIENT)
Dept: PULMONOLOGY | Facility: CLINIC | Age: 77
End: 2022-02-17

## 2022-02-17 VITALS
RESPIRATION RATE: 16 BRPM | WEIGHT: 135 LBS | HEIGHT: 64 IN | HEART RATE: 67 BPM | BODY MASS INDEX: 23.05 KG/M2 | TEMPERATURE: 97.8 F | SYSTOLIC BLOOD PRESSURE: 119 MMHG | OXYGEN SATURATION: 98 % | DIASTOLIC BLOOD PRESSURE: 60 MMHG

## 2022-02-17 DIAGNOSIS — B37.0 ORAL THRUSH: ICD-10-CM

## 2022-02-17 DIAGNOSIS — R06.09 CHRONIC DYSPNEA: ICD-10-CM

## 2022-02-17 DIAGNOSIS — R91.1 LUNG NODULE: Primary | ICD-10-CM

## 2022-02-17 DIAGNOSIS — F17.201 TOBACCO ABUSE, IN REMISSION: ICD-10-CM

## 2022-02-17 DIAGNOSIS — J45.909 ASTHMA, UNSPECIFIED ASTHMA SEVERITY, UNSPECIFIED WHETHER COMPLICATED, UNSPECIFIED WHETHER PERSISTENT: ICD-10-CM

## 2022-02-17 DIAGNOSIS — J44.9 CHRONIC OBSTRUCTIVE PULMONARY DISEASE, UNSPECIFIED COPD TYPE: ICD-10-CM

## 2022-02-17 PROCEDURE — 99213 OFFICE O/P EST LOW 20 MIN: CPT | Performed by: NURSE PRACTITIONER

## 2022-02-17 RX ORDER — FLUCONAZOLE 150 MG/1
150 TABLET ORAL ONCE
Qty: 2 TABLET | Refills: 2 | Status: SHIPPED | OUTPATIENT
Start: 2022-02-17 | End: 2022-02-17

## 2022-02-17 NOTE — PROGRESS NOTES
Primary Care Provider  Sam Hampton Jr., MD   Referring Provider  No ref. provider found    Patient Complaint  Follow-up, COPD, and Asthma      SUBJECTIVE    History of Presenting Illness  Lianne Bardales is a 76 y.o. female who presents to [facility] for 3-month follow-up.  Patient is seen Dr. Mcgovern November 17, 2021.  Patient was having a history of mouth sores ulcers irritation of her mouth.  Was felt that it was related to Trelegy.  So patient was started on Anoro at her last visit with Dr. Mcgovern.  Patient feels its not helping is not really working.  She still feels short of air at times with exertion.  She would like to go back on Trelegy if at all possible.  She did have a CT scan January 12, 2022 which showed no pulmonary nodule present at the time of scan.  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  In addition she has a diagnosis of a COPD, emphysema.  She has not been on steroids or antibiotics for her lungs in the past 3 months.    Denies coughing, wheezing, headaches, chest pain, weight loss or hemoptysis. Denies fevers, chills and night sweats. Lianne Bardales is able to perform ADLs without difficulties and denies any swollen glands/lymph nodes in the head or neck.    I have personally reviewed the review of systems, past family, social, medical and surgical histories; and agree with their findings.    Review of Systems  Constitutional symptoms:  Denied complaints   Ear, nose, throat: Denied complaints  Cardiovascular:  Denied complaints  Respiratory: Dyspnea with exertion gastrointestinal: Denied complaints  Musculoskeletal: Denied complaints    Family History   Problem Relation Age of Onset   • Cancer Mother    • Diabetes Mother    • Heart failure Mother    • Cancer Brother         Social History     Socioeconomic History   • Marital status:    Tobacco Use   • Smoking  status: Former Smoker     Quit date: 1990     Years since quittin.1   • Smokeless tobacco: Former User   Vaping Use   • Vaping Use: Never used   Substance and Sexual Activity   • Alcohol use: Defer   • Drug use: Defer   • Sexual activity: Defer        Past Medical History:   Diagnosis Date   • COPD (chronic obstructive pulmonary disease) (HCC)    • Lung nodule         Immunization History   Administered Date(s) Administered   • COVID-19 (PFIZER) PURPLE CAP 2021, 2021, 11/15/2021   • Flu Vaccine Quad PF >18YRS 2018, 2018, 2019   • Fluzone High Dose =>65 Years ([employer] ONLY) 2016   • Fluzone High-Dose 65+yrs 10/30/2020, 10/18/2021   • Hepatitis A 2018, 2019   • Influenza TIV (IM) 2020   • Pneumococcal Conjugate 13-Valent (PCV13) 2017   • Pneumococcal Polysaccharide (PPSV23) 2021   • Zostavax 2017, 2018, 2019       No Known Allergies       Current Outpatient Medications:   •  albuterol sulfate  (90 Base) MCG/ACT inhaler, Inhale 2 puffs Every 4 (Four) Hours As Needed., Disp: , Rfl:   •  aspirin 81 MG chewable tablet, Chew 81 mg Daily., Disp: , Rfl:   •  Cetirizine HCl 10 MG capsule, Take  by mouth., Disp: , Rfl:   •  Cholecalciferol (Vitamin D3) 250 MCG (12480 UT) tablet, Take  by mouth., Disp: , Rfl:   •  Diclofenac Sodium (VOLTAREN) 1 % gel gel, diclofenac sodium 1 % topical gel apply to the affected area(s) by topical route 2 times per day 2021  Active, Disp: , Rfl:   •  esomeprazole (nexIUM) 40 MG capsule, Take 1 capsule by mouth Every Morning Before Breakfast., Disp: 90 capsule, Rfl: 2  •  metoprolol succinate XL (Toprol XL) 50 MG 24 hr tablet, Take 1 tablet by mouth Daily., Disp: 90 tablet, Rfl: 3  •  multivitamin with minerals tablet tablet, Take 1 tablet by mouth Daily., Disp: , Rfl:   •  ondansetron (Zofran) 4 MG tablet, Zofran 4 mg oral tablet take 1 tablet by oral route every 6 hours as needed PRN 2021   "Active, Disp: , Rfl:   •  rosuvastatin (CRESTOR) 40 MG tablet, Take 1 tablet by mouth Every Night., Disp: 90 tablet, Rfl: 3  •  spironolactone-hydrochlorothiazide (Aldactazide) 25-25 MG tablet, Take 1 tablet by mouth Daily., Disp: 90 tablet, Rfl: 1  •  fluconazole (Diflucan) 150 MG tablet, Take 1 tablet by mouth 1 (One) Time for 1 dose. May repeat in 7 days if symptomatic, Disp: 2 tablet, Rfl: 2  •  Fluticasone-Umeclidin-Vilant (Trelegy Ellipta) 100-62.5-25 MCG/INH inhaler, Inhale 1 puff Daily., Disp: 28 each, Rfl: 5  •  nystatin (MYCOSTATIN) 100,000 unit/mL suspension, Take 5 mL by mouth 4 (Four) Times a Day., Disp: 280 mL, Rfl: 2       OBJECTIVE    Vital Signs   /60 (BP Location: Left arm, Patient Position: Sitting, Cuff Size: Adult)   Pulse 67   Temp 97.8 °F (36.6 °C)   Resp 16   Ht 162.6 cm (64\")   Wt 61.2 kg (135 lb)   SpO2 98%   BMI 23.17 kg/m²     Physical Exam  Vital Signs Reviewed   WDWN, Alert, NAD.    HEENT:  PERRL, EOMI.  OP, nares clear  Neck:  Supple, no JVD, no thyromegaly  Chest:  good aeration, clear to auscultation bilaterally, tympanic to percussion bilaterally, no work of breathing noted  CV: RRR, no MGR, pulses 2+, equal.  Abd:  Soft, NT, ND, + BS, no HSM  EXT:  no clubbing, no cyanosis, no edema  Neuro:  A&Ox3, CN grossly intact, no focal deficits.  Skin: No rashes or lesions noted    Results Review  I have personally reviewed the CT scan from January 12, 2022 with the following:Study Result    Narrative & Impression   PROCEDURE:  CT CHEST WO CONTRAST DIAGNOSTIC     COMPARISON: [facility], CT, CT CHEST WO CONTRAST DIAGNOSTIC, 10/12/2021, 8:43.     INDICATIONS:  lung nodules/opacities     TECHNIQUE:    CT images were created without the administration of contrast material.       PROTOCOL:     Standard imaging protocol performed                 RADIATION:      DLP: 397mGy*cm               Automated exposure control was utilized to minimize radiation dose.    "   FINDINGS:          Stable postoperative changes in the right upper lobe.  Significant emphysema.  Previously   demonstrated left lung opacities have resolved and may have been infectious or inflammatory.  There   is no suspicious pulmonary nodule.  No adenopathy in the chest.  Coronary artery calcification.  No   acute findings in the included upper abdomen.     IMPRESSION:               Postoperative change in the right upper lobe are unchanged.     Emphysema.     Interval resolution of the previously demonstrated left lung opacities.            ASHLEY OSUNA MD         Electronically Signed and Approved By: ASHLEY OSUNA MD on 1/12/2022 at 11:21        ASSESSMENT & PLAN    Patient Active Problem List   Diagnosis   • Acid reflux   • Anemia, iron deficiency   • Asthma   • COPD (chronic obstructive pulmonary disease) (HCC)   • Gall stones   • Chronic dyspnea   • History of fungal infection   • Lung nodule   • Tobacco abuse, in remission   • Primary hypertension   • Other hyperlipidemia       Diagnoses and all orders for this visit:    1. Lung nodule (Primary)  -     CT Chest Without Contrast Diagnostic; Future    2. Chronic dyspnea    3. Chronic obstructive pulmonary disease, unspecified COPD type (HCC)  -     Fluticasone-Umeclidin-Vilant (Trelegy Ellipta) 100-62.5-25 MCG/INH inhaler; Inhale 1 puff Daily.  Dispense: 28 each; Refill: 5    4. Asthma, unspecified asthma severity, unspecified whether complicated, unspecified whether persistent    5. Tobacco abuse, in remission    6. Oral thrush  -     Fluticasone-Umeclidin-Vilant (Trelegy Ellipta) 100-62.5-25 MCG/INH inhaler; Inhale 1 puff Daily.  Dispense: 28 each; Refill: 5  -     fluconazole (Diflucan) 150 MG tablet; Take 1 tablet by mouth 1 (One) Time for 1 dose. May repeat in 7 days if symptomatic  Dispense: 2 tablet; Refill: 2  -     nystatin (MYCOSTATIN) 100,000 unit/mL suspension; Take 5 mL by mouth 4 (Four) Times a Day.  Dispense: 280 mL; Refill: 2            Plan:  We will proceed to reorder Trelegy at this time patient to stop the Anoro.  We will add Diflucan and nystatin to her regimen for oral thrush treatment.  Discussed with patient surveillance of history of lung nodule lung status and we will plan to repeat a CT in July 2022 with a follow-up appointment to review the results.  Patient encouraged to rinse her mouth out after each use of her Trelegy inhalers.  In addition encourage patient to return to office sooner if she continues to have irritation in her mouth from use of Trelegy.    Smoking status: Former quit 28 years ago  Vaccination status: Up to date  Medications personally reviewed    Follow Up  Return in about 6 months (around 8/17/2022).    Patient was given instructions and counseling regarding her condition or for health maintenance advice. Please see specific information pulled into the AVS if appropriate.

## 2022-04-13 DIAGNOSIS — I10 PRIMARY HYPERTENSION: ICD-10-CM

## 2022-04-13 RX ORDER — HYDROCHLOROTHIAZIDE 25 MG/1
25 TABLET ORAL DAILY
Qty: 90 TABLET | Refills: 2 | OUTPATIENT
Start: 2022-04-13

## 2022-04-13 NOTE — TELEPHONE ENCOUNTER
Diuretics Protocol Failed 04/13/2022 02:48 PM   Protocol Details  Active on medication list    No active pregnancy on record    Normal serum potassium in past 12 months    Normal serum sodium in past 12 months    No positive pregnancy test in past 12 months    Recent or future visit with authorizing provider    Blood pressure on record    GFR> 30 ml/min in past year

## 2022-04-14 RX ORDER — SPIRONOLACTONE AND HYDROCHLOROTHIAZIDE 25; 25 MG/1; MG/1
1 TABLET ORAL DAILY
Qty: 90 TABLET | Refills: 2 | Status: SHIPPED | OUTPATIENT
Start: 2022-04-14 | End: 2022-09-29

## 2022-04-19 ENCOUNTER — TELEPHONE (OUTPATIENT)
Dept: INTERNAL MEDICINE | Facility: CLINIC | Age: 77
End: 2022-04-19

## 2022-04-19 NOTE — TELEPHONE ENCOUNTER
SPOKE WITH PATIENT. VERIFIED .       PATIENT IS SCHEDULED FOR MAMMOGRAM ON 2022. PATIENT IS WANTING TO HAVE THE DEXA BONE DENSITY SCAN SCAN DONE HOWEVER SHE STATES THAT INSURANCE WON'T APPROVE IT THIS YEAR. SHE DOESN'T BELIEVE THIS IS TRUE SO SHE IS GOING TO CALL AND VERIFY WHEN HER LAST DEXA BONE DENSITY SCAN WAS DONE AND CALL HER INSURANCE AGAIN. I TOLD HER IF SHE HADN'T HAD IT COMPLETED IN A MONTH WE WILL FOLLOW-UP.

## 2022-04-26 ENCOUNTER — HOSPITAL ENCOUNTER (OUTPATIENT)
Dept: OTHER | Facility: HOSPITAL | Age: 77
Discharge: HOME | End: 2022-04-26

## 2022-05-05 ENCOUNTER — HOSPITAL ENCOUNTER (OUTPATIENT)
Dept: OTHER | Facility: HOSPITAL | Age: 77
Discharge: HOME | End: 2022-05-05

## 2022-07-12 ENCOUNTER — OFFICE VISIT (OUTPATIENT)
Dept: INTERNAL MEDICINE | Facility: CLINIC | Age: 77
End: 2022-07-12

## 2022-07-12 VITALS
HEART RATE: 64 BPM | DIASTOLIC BLOOD PRESSURE: 83 MMHG | TEMPERATURE: 97.2 F | WEIGHT: 136.6 LBS | BODY MASS INDEX: 23.32 KG/M2 | SYSTOLIC BLOOD PRESSURE: 135 MMHG | HEIGHT: 64 IN | OXYGEN SATURATION: 95 %

## 2022-07-12 DIAGNOSIS — I10 PRIMARY HYPERTENSION: ICD-10-CM

## 2022-07-12 DIAGNOSIS — M79.10 MYALGIA: ICD-10-CM

## 2022-07-12 DIAGNOSIS — J44.9 CHRONIC OBSTRUCTIVE PULMONARY DISEASE, UNSPECIFIED COPD TYPE: ICD-10-CM

## 2022-07-12 DIAGNOSIS — R91.1 LUNG NODULE: ICD-10-CM

## 2022-07-12 DIAGNOSIS — N95.1 POSTMENOPAUSAL SYNDROME: ICD-10-CM

## 2022-07-12 DIAGNOSIS — Z00.00 ANNUAL PHYSICAL EXAM: Primary | ICD-10-CM

## 2022-07-12 DIAGNOSIS — E78.49 OTHER HYPERLIPIDEMIA: ICD-10-CM

## 2022-07-12 LAB
ALBUMIN SERPL-MCNC: 4.4 G/DL (ref 3.5–5.2)
ALBUMIN/GLOB SERPL: 1.6 G/DL
ALP SERPL-CCNC: 68 U/L (ref 39–117)
ALT SERPL W P-5'-P-CCNC: 18 U/L (ref 1–33)
ANION GAP SERPL CALCULATED.3IONS-SCNC: 12.9 MMOL/L (ref 5–15)
AST SERPL-CCNC: 29 U/L (ref 1–32)
BASOPHILS # BLD AUTO: 0.05 10*3/MM3 (ref 0–0.2)
BASOPHILS NFR BLD AUTO: 0.7 % (ref 0–1.5)
BILIRUB SERPL-MCNC: 0.4 MG/DL (ref 0–1.2)
BUN SERPL-MCNC: 21 MG/DL (ref 8–23)
BUN/CREAT SERPL: 25.3 (ref 7–25)
CALCIUM SPEC-SCNC: 9.1 MG/DL (ref 8.6–10.5)
CHLORIDE SERPL-SCNC: 101 MMOL/L (ref 98–107)
CHOLEST SERPL-MCNC: 158 MG/DL (ref 0–200)
CO2 SERPL-SCNC: 25.1 MMOL/L (ref 22–29)
CREAT SERPL-MCNC: 0.83 MG/DL (ref 0.57–1)
DEPRECATED RDW RBC AUTO: 47.3 FL (ref 37–54)
EGFRCR SERPLBLD CKD-EPI 2021: 73.2 ML/MIN/1.73
EOSINOPHIL # BLD AUTO: 0.08 10*3/MM3 (ref 0–0.4)
EOSINOPHIL NFR BLD AUTO: 1.2 % (ref 0.3–6.2)
ERYTHROCYTE [DISTWIDTH] IN BLOOD BY AUTOMATED COUNT: 13 % (ref 12.3–15.4)
GLOBULIN UR ELPH-MCNC: 2.7 GM/DL
GLUCOSE SERPL-MCNC: 91 MG/DL (ref 65–99)
HCT VFR BLD AUTO: 42.4 % (ref 34–46.6)
HDLC SERPL-MCNC: 76 MG/DL (ref 40–60)
HGB BLD-MCNC: 14.2 G/DL (ref 12–15.9)
IMM GRANULOCYTES # BLD AUTO: 0.02 10*3/MM3 (ref 0–0.05)
IMM GRANULOCYTES NFR BLD AUTO: 0.3 % (ref 0–0.5)
LDLC SERPL CALC-MCNC: 61 MG/DL (ref 0–100)
LDLC/HDLC SERPL: 0.76 {RATIO}
LYMPHOCYTES # BLD AUTO: 1.12 10*3/MM3 (ref 0.7–3.1)
LYMPHOCYTES NFR BLD AUTO: 16.4 % (ref 19.6–45.3)
MAGNESIUM SERPL-MCNC: 1.9 MG/DL (ref 1.6–2.4)
MCH RBC QN AUTO: 33.3 PG (ref 26.6–33)
MCHC RBC AUTO-ENTMCNC: 33.5 G/DL (ref 31.5–35.7)
MCV RBC AUTO: 99.5 FL (ref 79–97)
MONOCYTES # BLD AUTO: 0.74 10*3/MM3 (ref 0.1–0.9)
MONOCYTES NFR BLD AUTO: 10.8 % (ref 5–12)
NEUTROPHILS NFR BLD AUTO: 4.83 10*3/MM3 (ref 1.7–7)
NEUTROPHILS NFR BLD AUTO: 70.6 % (ref 42.7–76)
NRBC BLD AUTO-RTO: 0 /100 WBC (ref 0–0.2)
PLATELET # BLD AUTO: 246 10*3/MM3 (ref 140–450)
PMV BLD AUTO: 9.4 FL (ref 6–12)
POTASSIUM SERPL-SCNC: 4.2 MMOL/L (ref 3.5–5.2)
PROT SERPL-MCNC: 7.1 G/DL (ref 6–8.5)
RBC # BLD AUTO: 4.26 10*6/MM3 (ref 3.77–5.28)
SODIUM SERPL-SCNC: 139 MMOL/L (ref 136–145)
TRIGL SERPL-MCNC: 122 MG/DL (ref 0–150)
VLDLC SERPL-MCNC: 21 MG/DL (ref 5–40)
WBC NRBC COR # BLD: 6.84 10*3/MM3 (ref 3.4–10.8)

## 2022-07-12 PROCEDURE — 99214 OFFICE O/P EST MOD 30 MIN: CPT | Performed by: INTERNAL MEDICINE

## 2022-07-12 PROCEDURE — 1170F FXNL STATUS ASSESSED: CPT | Performed by: INTERNAL MEDICINE

## 2022-07-12 PROCEDURE — 1159F MED LIST DOCD IN RCRD: CPT | Performed by: INTERNAL MEDICINE

## 2022-07-12 PROCEDURE — G0439 PPPS, SUBSEQ VISIT: HCPCS | Performed by: INTERNAL MEDICINE

## 2022-07-12 PROCEDURE — 80053 COMPREHEN METABOLIC PANEL: CPT | Performed by: INTERNAL MEDICINE

## 2022-07-12 PROCEDURE — 85025 COMPLETE CBC W/AUTO DIFF WBC: CPT | Performed by: INTERNAL MEDICINE

## 2022-07-12 PROCEDURE — 83735 ASSAY OF MAGNESIUM: CPT | Performed by: INTERNAL MEDICINE

## 2022-07-12 PROCEDURE — 80061 LIPID PANEL: CPT | Performed by: INTERNAL MEDICINE

## 2022-07-12 RX ORDER — ALBUTEROL SULFATE 90 UG/1
2 AEROSOL, METERED RESPIRATORY (INHALATION) EVERY 4 HOURS PRN
Qty: 18 G | Refills: 3 | Status: SHIPPED | OUTPATIENT
Start: 2022-07-12 | End: 2022-12-21

## 2022-07-12 RX ORDER — ONDANSETRON 4 MG/1
4 TABLET, FILM COATED ORAL EVERY 8 HOURS PRN
Qty: 90 TABLET | Refills: 1 | Status: SHIPPED | OUTPATIENT
Start: 2022-07-12 | End: 2023-02-06

## 2022-07-12 NOTE — PROGRESS NOTES
The ABCs of the Annual Wellness Visit  Subsequent Medicare Wellness Visit    Chief Complaint   Patient presents with   • Hypertension     Follow up    • leg and arm cramps     5-6 month    • breathing medication      Pt is not currently taking Trelegy due to it hurting her tongue and give her thrust       Subjective    History of Present Illness:  Lianne Bardales is a 77 y.o. female who presents for a Subsequent Medicare Wellness Visit.    COPD- patient follow-up with pulm in 1 month. Patient without pulmonary function test in about 2 years. Patient intolerant to trelegy due to thrush despite use of diflucan and nystatin.   hypertension- patient denies HEADACHES, dizziness, chest pain. Patient without home readings.   hyperlipidemia- patient tolerating crestor previously .   Patient also with myalgias. Patient has been on statin for years without issue. Patient has stopped taking multivitamin.     The following portions of the patient's history were reviewed and   updated as appropriate: allergies, current medications, past family history, past medical history, past social history, past surgical history and problem list.    Compared to one year ago, the patient feels her physical   health is the same.    Compared to one year ago, the patient feels her mental   health is the same.    Recent Hospitalizations:  She was not admitted to the hospital during the last year.       Current Medical Providers:  Patient Care Team:  Sam Hampton Jr., MD as PCP - General (Internal Medicine)    Outpatient Medications Prior to Visit   Medication Sig Dispense Refill   • aspirin 81 MG chewable tablet Chew 81 mg Daily.     • Cetirizine HCl 10 MG capsule Take  by mouth.     • Cholecalciferol (Vitamin D3) 250 MCG (26000 UT) tablet Take  by mouth.     • esomeprazole (nexIUM) 40 MG capsule Take 1 capsule by mouth Every Morning Before Breakfast. 90 capsule 2   • metoprolol succinate XL (Toprol XL) 50 MG 24 hr tablet Take 1  tablet by mouth Daily. 90 tablet 3   • nystatin (MYCOSTATIN) 100,000 unit/mL suspension Take 5 mL by mouth 4 (Four) Times a Day. 280 mL 2   • rosuvastatin (CRESTOR) 40 MG tablet Take 1 tablet by mouth Every Night. 90 tablet 3   • spironolactone-hydrochlorothiazide (ALDACTAZIDE) 25-25 MG tablet TAKE 1 TABLET BY MOUTH DAILY 90 tablet 2   • Diclofenac Sodium (VOLTAREN) 1 % gel gel diclofenac sodium 1 % topical gel apply to the affected area(s) by topical route 2 times per day 2/8/2021  Active     • ondansetron (ZOFRAN) 4 MG tablet Zofran 4 mg oral tablet take 1 tablet by oral route every 6 hours as needed PRN 2/8/2021  Active     • albuterol sulfate  (90 Base) MCG/ACT inhaler Inhale 2 puffs Every 4 (Four) Hours As Needed.     • Fluticasone-Umeclidin-Vilant (Trelegy Ellipta) 100-62.5-25 MCG/INH inhaler Inhale 1 puff Daily. 28 each 5   • Fluticasone-Umeclidin-Vilant (Trelegy Ellipta) 100-62.5-25 MCG/INH inhaler Inhale 1 puff Daily. 2 each 0   • multivitamin with minerals tablet tablet Take 1 tablet by mouth Daily.       No facility-administered medications prior to visit.       No opioid medication identified on active medication list. I have reviewed chart for other potential  high risk medication/s and harmful drug interactions in the elderly.        Aspirin is on active medication list. Aspirin use is indicated based on review of current medical condition/s. Pros and cons of this therapy have been discussed today. Benefits of this medication outweigh potential harm.  Patient has been encouraged to continue taking this medication.  .      Patient Active Problem List   Diagnosis   • Acid reflux   • Anemia, iron deficiency   • Asthma   • COPD (chronic obstructive pulmonary disease) (HCC)   • Gall stones   • Chronic dyspnea   • History of fungal infection   • Lung nodule   • Tobacco abuse, in remission   • Primary hypertension   • Other hyperlipidemia     Advance Care Planning  Advance Directive is not on file.   "ACP discussion was held with the patient during this visit. Patient does not have an advance directive, information provided.          Objective    Vitals:    22 0801   BP: 135/83   BP Location: Left arm   Pulse: 64   Temp: 97.2 °F (36.2 °C)   TempSrc: Temporal   SpO2: 95%   Weight: 62 kg (136 lb 9.6 oz)   Height: 162.6 cm (64\")     BP Readings from Last 3 Encounters:   22 135/83   22 119/60   21 128/84         Estimated body mass index is 23.45 kg/m² as calculated from the following:    Height as of this encounter: 162.6 cm (64\").    Weight as of this encounter: 62 kg (136 lb 9.6 oz).    BMI is within normal parameters. No other follow-up for BMI required.      Does the patient have evidence of cognitive impairment? No    Physical Exam  Appearance: No acute distress, well-nourished  Head: normocephalic, atraumatic  Eyes: extraocular movements intact, no scleral icterus, no conjunctival injection  Ears, Nose, and Throat: external ears normal, nares patent, moist mucous membranes  Cardiovascular: regular rate and rhythm. no murmurs, rubs, or gallops. no edema  Respiratory: breathing comfortably, symmetric chest rise, clear to auscultation bilaterally. No wheezes, rales, or rhonchi.  Neuro: alert and oriented to time, place, and person. Normal gait  Psych: normal mood and affect       HEALTH RISK ASSESSMENT    Smoking Status:  Social History     Tobacco Use   Smoking Status Former Smoker   • Quit date: 1990   • Years since quittin.5   Smokeless Tobacco Former User     Alcohol Consumption:  Social History     Substance and Sexual Activity   Alcohol Use Defer     Fall Risk Screen:    STEADI Fall Risk Assessment was completed, and patient is at LOW risk for falls.Assessment completed on:2022    Depression Screening:  PHQ-2/PHQ-9 Depression Screening 2022   Retired PHQ-9 Total Score -   Retired Total Score -   Little Interest or Pleasure in Doing Things 0-->not at all   Feeling " Down, Depressed or Hopeless 0-->not at all   PHQ-9: Brief Depression Severity Measure Score 0       Health Habits and Functional and Cognitive Screening:  Functional & Cognitive Status 7/12/2022   Do you have difficulty preparing food and eating? No   Do you have difficulty bathing yourself, getting dressed or grooming yourself? No   Do you have difficulty using the toilet? No   Do you have difficulty moving around from place to place? No   Do you have trouble with steps or getting out of a bed or a chair? No   Current Diet Unhealthy Diet   Dental Exam Up to date   Eye Exam Up to date   Exercise (times per week) 3 times per week   Current Exercises Include Yard Work;Walking   Do you need help using the phone?  No   Are you deaf or do you have serious difficulty hearing?  No   Do you need help with transportation? No   Do you need help shopping? No   Do you need help preparing meals?  No   Do you need help with housework?  No   Do you need help with laundry? No   Do you need help taking your medications? No   Do you need help managing money? No   Do you ever drive or ride in a car without wearing a seat belt? No   Have you felt unusual stress, anger or loneliness in the last month? No   Who do you live with? Spouse   If you need help, do you have trouble finding someone available to you? No   Have you been bothered in the last four weeks by sexual problems? No   Do you have difficulty concentrating, remembering or making decisions? No       Age-appropriate Screening Schedule:  Refer to the list below for future screening recommendations based on patient's age, sex and/or medical conditions. Orders for these recommended tests are listed in the plan section. The patient has been provided with a written plan.    Health Maintenance   Topic Date Due   • DXA SCAN  Never done   • TDAP/TD VACCINES (1 - Tdap) Never done   • ZOSTER VACCINE (3 of 3) 01/02/2020   • INFLUENZA VACCINE  10/01/2022   • LIPID PANEL  11/29/2022               Assessment & Plan    CMS Preventative Services Quick Reference  Risk Factors Identified During Encounter  None Identified  The above risks/problems have been discussed with the patient.  Follow up actions/plans if indicated are seen below in the Assessment/Plan Section.  Pertinent information has been shared with the patient in the After Visit Summary.    Diagnoses and all orders for this visit:    1. Annual physical exam (Primary)    2. Chronic obstructive pulmonary disease, unspecified COPD type (HCC)  Comments:  pt unable to tolerate trelegy. will start stiolto to avoid ICS due to thrush. recheck PFT. pulm notes reviewed.   Orders:  -     albuterol sulfate  (90 Base) MCG/ACT inhaler; Inhale 2 puffs Every 4 (Four) Hours As Needed for Wheezing or Shortness of Air.  Dispense: 18 g; Refill: 3  -     tiotropium bromide-olodaterol (STIOLTO RESPIMAT) 2.5-2.5 MCG/ACT aerosol solution inhaler; Inhale 2 puffs Daily.  Dispense: 4 g; Refill: 3  -     Full Pulmonary Function Test With Bronchodilator; Future    3. Primary hypertension  Comments:  cont current regimen. check labs.   Orders:  -     Comprehensive Metabolic Panel  -     CBC & Differential    4. Other hyperlipidemia  Comments:  cont statin. check labs.   Orders:  -     Lipid Panel  -     Comprehensive Metabolic Panel    5. Lung nodule  Comments:  chest CT pending in about 2 weeks.     6. Postmenopausal syndrome  -     DEXA Bone Density Axial; Future    7. Myalgia  Comments:  check electrolytes. possibly due to higher dose statin and consider reducing if labs normal.   Orders:  -     Magnesium    Other orders  -     ondansetron (ZOFRAN) 4 MG tablet; Take 1 tablet by mouth Every 8 (Eight) Hours As Needed for Nausea or Vomiting.  Dispense: 90 tablet; Refill: 1  -     Diclofenac Sodium (VOLTAREN) 1 % gel gel; Apply  topically to the appropriate area as directed 2 (Two) Times a Day.  Dispense: 350 g; Refill: 1        Follow Up:   No follow-ups on  file.     An After Visit Summary and PPPS were made available to the patient.    {Optional Chart Navigation Links Wrapup  Review (Popup)  Advance Care Planning  Labs  CC  Problem List  Visit Diagnosis  Medications  Result Review  Imaging  [facility]  BestPractice  SmartSets  SnapShot  Encounters  Notes  Media  Procedures :23}

## 2022-07-13 ENCOUNTER — TELEPHONE (OUTPATIENT)
Dept: INTERNAL MEDICINE | Facility: CLINIC | Age: 77
End: 2022-07-13

## 2022-07-13 RX ORDER — METOPROLOL SUCCINATE 50 MG/1
50 TABLET, EXTENDED RELEASE ORAL DAILY
Qty: 90 TABLET | Refills: 3 | Status: SHIPPED | OUTPATIENT
Start: 2022-07-13

## 2022-07-13 RX ORDER — ESOMEPRAZOLE MAGNESIUM 40 MG/1
40 CAPSULE, DELAYED RELEASE ORAL
Qty: 90 CAPSULE | Refills: 3 | Status: SHIPPED | OUTPATIENT
Start: 2022-07-13

## 2022-07-13 RX ORDER — ROSUVASTATIN CALCIUM 40 MG/1
TABLET, COATED ORAL
Qty: 90 TABLET | Refills: 3 | Status: SHIPPED | OUTPATIENT
Start: 2022-07-13

## 2022-07-13 NOTE — TELEPHONE ENCOUNTER
HMG-CoA Reductase Inhibitors (Statins) Protocol Passed 07/13/2022 08:53 AM   Protocol Details  No active pregnancy on record    Lipid panel in past 12 months    No positive pregnancy test in past 12 months    ALK Phos in past 12 months    ALT in past 12 months    AST in past 12 months    Recent or future visit with authorizing provider     Proton Pump Inhibitors Protocol Passed 07/13/2022 08:53 AM    No active pregnancy on record    No positive pregnancy test in past 12 months    No history of clostridium difficile diagnosis on record    Not on Clopidogrel (Plavix) or if on, refill is for Pantoprazole (Protonix)    No osteoporosis diagnosis on problem list    GFR>30 ml/min in past year    Recent or future visit with authorizing provider     Beta-Blockers Protocol Passed 07/13/2022 08:53 AM   Protocol Details  No active pregnancy on record    BP under 140/90 in past year    No positive pregnancy test in past 12 months    Recent or future visit with authorizing provider

## 2022-07-13 NOTE — TELEPHONE ENCOUNTER
----- Message from Sam Hampton Jr., MD sent at 7/13/2022 12:25 PM EDT -----  Fairly reassuring labs. Slightly low magnesium and would recommend replace with 400mg daily.

## 2022-07-26 ENCOUNTER — HOSPITAL ENCOUNTER (OUTPATIENT)
Dept: BONE DENSITY | Facility: HOSPITAL | Age: 77
Discharge: HOME | End: 2022-07-26

## 2022-07-26 ENCOUNTER — HOSPITAL ENCOUNTER (OUTPATIENT)
Dept: CT IMAGING | Facility: HOSPITAL | Age: 77
Discharge: HOME | End: 2022-07-26

## 2022-07-26 ENCOUNTER — TELEPHONE (OUTPATIENT)
Dept: INTERNAL MEDICINE | Facility: CLINIC | Age: 77
End: 2022-07-26

## 2022-07-26 DIAGNOSIS — R91.1 LUNG NODULE: ICD-10-CM

## 2022-07-26 DIAGNOSIS — N95.1 POSTMENOPAUSAL SYNDROME: ICD-10-CM

## 2022-07-26 PROCEDURE — 71250 CT THORAX DX C-: CPT

## 2022-07-26 PROCEDURE — 77080 DXA BONE DENSITY AXIAL: CPT

## 2022-07-26 NOTE — TELEPHONE ENCOUNTER
I recorded that last one she had done was at outside facility [facility]. If patient has that report, I can certainly compare the numbers.

## 2022-07-26 NOTE — TELEPHONE ENCOUNTER
PT(PATIENT) VERIFIED     CONTACTED PT(PATIENT) PER PROVIDER'S INSTRUCTIONS    PT(PATIENT) WOULD LIKE TO KNOW IF THERE ARE ANY SIGNIFICANT CHANGES SINCE PREVIOUS REPORT    DXA Bone Density Screening (2020 13:35)    I DO NOT SEE ANY MENTION OF PREVIOUS REPORT IN THE CURRENTLY REPORT    DEXA Bone Density Axial (2022 10:20)    PLEASE ADVISE

## 2022-07-26 NOTE — TELEPHONE ENCOUNTER
ATTEMPTED TO CONTACT PT PER PROVIDER'S INSTRUCTIONS     NO ANSWER     LEFT VOICEMAIL WITH INSTRUCTIONS TO RETURN CALL TO OFFICE AT [phone]    OK FOR HUB TO ADVISE/READ    Sam Hampton Jr., MD   7/26/2022 11:10 AM EDT       Osteopenia noted - would recommend Calcium + vitamin D supplementation as well as weight bearing exercise to help prevent progression.

## 2022-07-26 NOTE — TELEPHONE ENCOUNTER
ATTEMPTED TO CONTACT PT PER PROVIDER'S INSTRUCTIONS     NO ANSWER     LEFT VOICEMAIL WITH INSTRUCTIONS TO RETURN CALL TO OFFICE AT [phone]    OK FOR HUB TO ADVISE/READ   Sam Hampton Jr., MD 1 hour ago (2:01 PM)        I recorded that last one she had done was at outside facility [facility]. If patient has that report, I can certainly compare the numbers.

## 2022-07-26 NOTE — TELEPHONE ENCOUNTER
PATIENT GIVEN HUB TO READ MESSAGE AND VERBALIZED UNDERSTANDING. PATIENTS ONLY QUESTION WAS THAT SHE WANTED TO KNOW IF THERE HAS BEEN PROGRESSION SINCE HER PREVIOUS SCAN.

## 2022-07-26 NOTE — TELEPHONE ENCOUNTER
----- Message from Sam Hampton Jr., MD sent at 7/26/2022 11:10 AM EDT -----  Osteopenia noted - would recommend Calcium + vitamin D supplementation as well as weight bearing exercise to help prevent progression.

## 2022-07-27 ENCOUNTER — APPOINTMENT (OUTPATIENT)
Dept: BONE DENSITY | Facility: HOSPITAL | Age: 77
End: 2022-07-27

## 2022-08-19 NOTE — PROGRESS NOTES
Primary Care Provider  Sam Hampton Jr., MD   Referring Provider  No ref. provider found    Patient Complaint  Asthma, Lung Nodule, COPD, Follow-up (3 Month Follow Up ), Shortness of Breath (With ambulation, getting worse ), and Dry Mouth      SUBJECTIVE    History of Presenting Illness  Lianne Bardales is a pleasant 77 y.o. female who presents to [facility] for 3-month follow-up.  Patient is here with her daughter today.  Patient had a CT scan done 7/26/2022  Results of that.  Patient also saw  Answered her PCP and has an order for pulmonary function test November 17, 2022.   Answered I provided patient with Stiolto as Trelegy was causing her to have a sore throat and thrush.  Patient does not tolerate the Stiolto very well due to dry mouth.  Does not feel that it is working as well for her.  Patient does have an albuterol inhaler but has not used it.  She does have shortness of air when she exerts herself of moderate severity but recovers with rest.  Patient continues to walk daily for exercise.  Patient with a diagnosis of asthma, COPD lung nodules.  Patient states she has a history of right upper lobe lobectomy secondary to infectious organism which was done in 2013.    Denies coughing, wheezing, headaches, chest pain, weight loss or hemoptysis. Denies fevers, chills and night sweats. Lianne Bardales is able to perform ADLs without difficulties and denies any swollen glands/lymph nodes in the head or neck.    I have personally reviewed the review of systems, past family, social, medical and surgical histories; and agree with their findings.    Review of Systems   Constitutional: Negative.  Negative for chills, fatigue, fever and unexpected weight change.   Eyes: Negative.    Respiratory: Positive for shortness of breath. Negative for cough and wheezing.    Cardiovascular: Negative.  Negative for chest pain, palpitations and leg  swelling.   Musculoskeletal: Negative.    Skin: Negative.         Family History   Problem Relation Age of Onset   • Cancer Mother    • Diabetes Mother    • Heart failure Mother    • Cancer Brother         Social History     Socioeconomic History   • Marital status:    Tobacco Use   • Smoking status: Former Smoker     Quit date: 1990     Years since quittin.6   • Smokeless tobacco: Never Used   Vaping Use   • Vaping Use: Never used   Substance and Sexual Activity   • Alcohol use: Defer   • Drug use: Defer   • Sexual activity: Defer        Past Medical History:   Diagnosis Date   • COPD (chronic obstructive pulmonary disease) (HCC)    • Lung nodule         Immunization History   Administered Date(s) Administered   • COVID-19 (PFIZER) PURPLE CAP 2021, 2021, 11/15/2021   • Fluzone High Dose =>65 Years ([employer] ONLY) 2016   • Fluzone High-Dose 65+yrs 10/30/2020, 10/18/2021   • Fluzone Quad >6mos (Multi-dose) 2018, 2018, 2019   • Hepatitis A 2018, 2019   • Influenza TIV (IM) 2020   • Pneumococcal Conjugate 13-Valent (PCV13) 2017   • Pneumococcal Polysaccharide (PPSV23) 2021   • Shingrix 2019, 2019   • Zostavax 2017, 2018, 2019       No Known Allergies       Current Outpatient Medications:   •  albuterol sulfate  (90 Base) MCG/ACT inhaler, Inhale 2 puffs Every 4 (Four) Hours As Needed for Wheezing or Shortness of Air., Disp: 18 g, Rfl: 3  •  aspirin 81 MG chewable tablet, Chew 81 mg Daily., Disp: , Rfl:   •  Cetirizine HCl 10 MG capsule, Take  by mouth., Disp: , Rfl:   •  Cholecalciferol (Vitamin D3) 250 MCG (81586 UT) tablet, Take  by mouth., Disp: , Rfl:   •  Diclofenac Sodium (VOLTAREN) 1 % gel gel, Apply  topically to the appropriate area as directed 2 (Two) Times a Day., Disp: 350 g, Rfl: 1  •  esomeprazole (nexIUM) 40 MG capsule, Take 1 capsule by mouth Every Morning Before Breakfast., Disp: 90  "capsule, Rfl: 3  •  metoprolol succinate XL (TOPROL-XL) 50 MG 24 hr tablet, TAKE 1 TABLET BY MOUTH DAILY, Disp: 90 tablet, Rfl: 3  •  ondansetron (ZOFRAN) 4 MG tablet, Take 1 tablet by mouth Every 8 (Eight) Hours As Needed for Nausea or Vomiting., Disp: 90 tablet, Rfl: 1  •  rosuvastatin (CRESTOR) 40 MG tablet, TAKE 1 TABLET BY MOUTH EVERY NIGHT AT BEDTIME., Disp: 90 tablet, Rfl: 3  •  spironolactone-hydrochlorothiazide (ALDACTAZIDE) 25-25 MG tablet, TAKE 1 TABLET BY MOUTH DAILY, Disp: 90 tablet, Rfl: 2  •  Budeson-Glycopyrrol-Formoterol (BREZTRI) 160-9-4.8 MCG/ACT aerosol inhaler, Inhale 2 puffs 2 (Two) Times a Day., Disp: 3 each, Rfl: 4  •  nystatin (MYCOSTATIN) 100,000 unit/mL suspension, Take 5 mL by mouth 4 (Four) Times a Day., Disp: 280 mL, Rfl: 2       OBJECTIVE    Vital Signs   /83 (BP Location: Left arm, Patient Position: Sitting, Cuff Size: Adult)   Pulse 66   Temp 96.6 °F (35.9 °C) (Infrared)   Resp 16   Ht 163.8 cm (64.5\")   Wt 61.2 kg (135 lb)   SpO2 97% Comment: Room air  BMI 22.81 kg/m²     Physical Exam  Vitals reviewed.   Constitutional:       Appearance: Normal appearance.   HENT:      Head: Normocephalic and atraumatic.      Nose: Nose normal.      Mouth/Throat:      Mouth: Mucous membranes are moist.      Pharynx: Oropharynx is clear.   Eyes:      Extraocular Movements: Extraocular movements intact.      Conjunctiva/sclera: Conjunctivae normal.      Pupils: Pupils are equal, round, and reactive to light.   Cardiovascular:      Rate and Rhythm: Normal rate and regular rhythm.      Pulses: Normal pulses.      Heart sounds: Normal heart sounds.   Pulmonary:      Effort: Pulmonary effort is normal. No respiratory distress.      Breath sounds: Normal breath sounds. No stridor. No wheezing, rhonchi or rales.   Abdominal:      General: Bowel sounds are normal.   Musculoskeletal:         General: Normal range of motion.      Cervical back: Normal range of motion and neck supple.      Right " lower leg: No edema.      Left lower leg: No edema.   Skin:     General: Skin is warm and dry.   Neurological:      General: No focal deficit present.      Mental Status: She is alert and oriented to person, place, and time.   Psychiatric:         Mood and Affect: Mood normal.         Behavior: Behavior normal.          Results Review  I have personally reviewed the CT scan from 7/26/2022 with the following:  CT Chest Without Contrast Diagnostic [[id number]] (Order [id number])  Order  Status: Final result             Appointment Information      Display Notes    V/LMK                   PACS Images     Radiology Images    Study Result    Narrative & Impression   PROCEDURE:  CT CHEST WO CONTRAST DIAGNOSTIC     COMPARISON: [facility], CT, CHEST W/O CONTRAST, 6/15/2020, 10:37.  [facility], CT, CT CHEST WO CONTRAST DIAGNOSTIC, 1/12/2022, 8:55.     INDICATIONS:  lung nodule, emphysema, right upper lung lobectomy due to infectious organism     TECHNIQUE:    CT images were created without the administration of contrast material.       PROTOCOL:     Standard imaging protocol performed                 RADIATION:      DLP: 347mGy*cm               Automated exposure control was utilized to minimize radiation dose.      FINDINGS:          Sub cm mediastinal lymph nodes are nonpathologic by size criteria.  No hilar or axillary adenopathy   is seen.  Severe coronary artery atherosclerotic calcification is present.  No pleural or   pericardial effusion is evident.     Irregular density in the right upper lung field is consistent with scarring or atelectasis.    Postoperative changes are noted in the right upper lobe.  A 0.3 cm nodule in the left lower lobe on   image 50 is noted, new in the interval.  A 0.3 cm nodular density in the left upper lobe on image   34 appears more prominent in the interval.  Other small irregular densities in the lung fields   bilaterally appear stable in the interval  and are favored to represent scarring or atelectasis.     A cholecystectomy has been performed.  Nonobstructing right renal stones are noted measuring up to   0.4 cm.  Mild right renal scarring is noted.  No focal osseous lesion is seen.     IMPRESSION:                 1. Status post right upper lobe wedge resection.  2. Small nodular densities in the left lung which appear more prominent in the interval.  Continued   follow-up is recommended.  3. Previous cholecystectomy  4. Nonobstructing right renal stones  5. Severe coronary artery atherosclerotic disease            Oli Dunn M.D.         Electronically Signed and Approved By: Oli Dunn M.D. on 7/26/2022 at 12:30            ASSESSMENT & PLAN    Patient Active Problem List   Diagnosis   • Acid reflux   • Anemia, iron deficiency   • Asthma   • COPD (chronic obstructive pulmonary disease) (HCC)   • Gall stones   • Chronic dyspnea   • History of fungal infection   • Lung nodule   • Tobacco abuse, in remission   • Primary hypertension   • Other hyperlipidemia       Diagnoses and all orders for this visit:    1. Chronic dyspnea (Primary)  -     Budeson-Glycopyrrol-Formoterol (BREZTRI) 160-9-4.8 MCG/ACT aerosol inhaler; Inhale 2 puffs 2 (Two) Times a Day.  Dispense: 3 each; Refill: 4    2. Lung nodule  -     CT Chest Without Contrast Diagnostic; Future    3. Asthma, unspecified asthma severity, unspecified whether complicated, unspecified whether persistent  -     Budeson-Glycopyrrol-Formoterol (BREZTRI) 160-9-4.8 MCG/ACT aerosol inhaler; Inhale 2 puffs 2 (Two) Times a Day.  Dispense: 3 each; Refill: 4    4. Chronic obstructive pulmonary disease, unspecified COPD type (HCC)  -     Budeson-Glycopyrrol-Formoterol (BREZTRI) 160-9-4.8 MCG/ACT aerosol inhaler; Inhale 2 puffs 2 (Two) Times a Day.  Dispense: 3 each; Refill: 4           Plan:  We will start patient on Breztri today 2 puffs twice daily patient instructed in use and rinsing her mouth out after each use  to prevent oral thrush.  Discontinuing Stiolto at this time.  Patient instructed to use her albuterol inhaler as needed for when she has experiencing shortness of air with exertion.  Patient verbalizes understanding.  Patient is scheduled for pulmonary function test 11/17/2022 recommend patient to follow-up after to review results.  We will also schedule patient for follow-up CT scan in 6 months to monitor status of left lobe nodules.  Return to office sooner if needed.    Smoking status: Former   vaccination status: To date  Medications personally reviewed    Follow Up  Return in about 16 weeks (around 12/12/2022).    Patient was given instructions and counseling regarding her condition or for health maintenance advice. Please see specific information pulled into the AVS if appropriate.

## 2022-08-22 ENCOUNTER — OFFICE VISIT (OUTPATIENT)
Dept: PULMONOLOGY | Facility: CLINIC | Age: 77
End: 2022-08-22

## 2022-08-22 VITALS
BODY MASS INDEX: 22.49 KG/M2 | WEIGHT: 135 LBS | OXYGEN SATURATION: 97 % | HEART RATE: 66 BPM | TEMPERATURE: 96.6 F | SYSTOLIC BLOOD PRESSURE: 128 MMHG | HEIGHT: 65 IN | RESPIRATION RATE: 16 BRPM | DIASTOLIC BLOOD PRESSURE: 83 MMHG

## 2022-08-22 DIAGNOSIS — J44.9 CHRONIC OBSTRUCTIVE PULMONARY DISEASE, UNSPECIFIED COPD TYPE: ICD-10-CM

## 2022-08-22 DIAGNOSIS — R06.09 CHRONIC DYSPNEA: Primary | ICD-10-CM

## 2022-08-22 DIAGNOSIS — R91.1 LUNG NODULE: ICD-10-CM

## 2022-08-22 DIAGNOSIS — J45.909 ASTHMA, UNSPECIFIED ASTHMA SEVERITY, UNSPECIFIED WHETHER COMPLICATED, UNSPECIFIED WHETHER PERSISTENT: ICD-10-CM

## 2022-08-22 PROCEDURE — 99213 OFFICE O/P EST LOW 20 MIN: CPT | Performed by: NURSE PRACTITIONER

## 2022-08-30 RX ORDER — BUDESONIDE, GLYCOPYRROLATE, AND FORMOTEROL FUMARATE 160; 9; 4.8 UG/1; UG/1; UG/1
2 AEROSOL, METERED RESPIRATORY (INHALATION) 2 TIMES DAILY
Qty: 4 EACH | Refills: 0 | COMMUNITY
Start: 2022-08-30 | End: 2022-09-29

## 2022-09-23 ENCOUNTER — TELEPHONE (OUTPATIENT)
Dept: INTERNAL MEDICINE | Facility: CLINIC | Age: 77
End: 2022-09-23

## 2022-09-23 ENCOUNTER — READMISSION MANAGEMENT (OUTPATIENT)
Dept: CALL CENTER | Facility: HOSPITAL | Age: 77
End: 2022-09-23

## 2022-09-23 NOTE — TELEPHONE ENCOUNTER
Caller: [facility]      Best call back number: [phone]    New or established patient?  [] New  [x] Established    Date of discharge: 09/23/2022    Facility discharged from: [facility]     Diagnosis/Symptoms: POST PCI    Length of stay (If applicable): 2 DAYS

## 2022-09-23 NOTE — OUTREACH NOTE
Prep Survey    Flowsheet Row Responses   [facility] patient discharged from? Non-[facility]   Is LACE score < 7 ? Non-BH Discharge   Emergency Room discharge w/ pulse ox? No   Eligibility [facility]   [facility]   Date of Discharge 09/23/22   Discharge Disposition Home or Self Care   Discharge diagnosis Post PCI   Does the patient have one of the following disease processes/diagnoses(primary or secondary)? Other   Prep survey completed? Yes          GISELE GOTTLIEB - Registered Nurse

## 2022-09-26 ENCOUNTER — TRANSITIONAL CARE MANAGEMENT TELEPHONE ENCOUNTER (OUTPATIENT)
Dept: CALL CENTER | Facility: HOSPITAL | Age: 77
End: 2022-09-26

## 2022-09-26 NOTE — OUTREACH NOTE
Call Center TCM Note    Flowsheet Row Responses   [facility] patient discharged from? Non-  [[facility]]   Does the patient have one of the following disease processes/diagnoses(primary or secondary)? Other   TCM attempt successful? No   Unsuccessful attempts Attempt 2          Grace Pascual RN    9/26/2022, 12:21 EDT

## 2022-09-26 NOTE — OUTREACH NOTE
Call Center TCM Note    Flowsheet Row Responses   [facility] patient discharged from? Non-  [[facility]]   Does the patient have one of the following disease processes/diagnoses(primary or secondary)? Other   TCM attempt successful? No  [verbal release for Annabelle Lehman dtr on file but no number listed]   Unsuccessful attempts Attempt 1          Grace Pascual RN    9/26/2022, 08:35 EDT

## 2022-09-27 ENCOUNTER — TRANSITIONAL CARE MANAGEMENT TELEPHONE ENCOUNTER (OUTPATIENT)
Dept: CALL CENTER | Facility: HOSPITAL | Age: 77
End: 2022-09-27

## 2022-09-27 NOTE — OUTREACH NOTE
Call Center TCM Note    Flowsheet Row Responses   [facility] patient discharged from? Non-   Does the patient have one of the following disease processes/diagnoses(primary or secondary)? Other   TCM attempt successful? No   Unsuccessful attempts Attempt 3  [Daughter on verbal release (no number listed)]          Gege Brown RN    9/27/2022, 08:44 EDT

## 2022-09-29 ENCOUNTER — OFFICE VISIT (OUTPATIENT)
Dept: INTERNAL MEDICINE | Facility: CLINIC | Age: 77
End: 2022-09-29

## 2022-09-29 VITALS
TEMPERATURE: 98.6 F | DIASTOLIC BLOOD PRESSURE: 75 MMHG | OXYGEN SATURATION: 97 % | SYSTOLIC BLOOD PRESSURE: 113 MMHG | HEIGHT: 65 IN | BODY MASS INDEX: 22.56 KG/M2 | HEART RATE: 86 BPM | WEIGHT: 135.4 LBS

## 2022-09-29 DIAGNOSIS — R91.1 LUNG NODULE: ICD-10-CM

## 2022-09-29 DIAGNOSIS — T14.8XXA HEMATOMA: ICD-10-CM

## 2022-09-29 DIAGNOSIS — Z23 NEED FOR INFLUENZA VACCINATION: ICD-10-CM

## 2022-09-29 DIAGNOSIS — I10 PRIMARY HYPERTENSION: ICD-10-CM

## 2022-09-29 DIAGNOSIS — E78.49 OTHER HYPERLIPIDEMIA: ICD-10-CM

## 2022-09-29 DIAGNOSIS — I25.118 CORONARY ARTERY DISEASE OF NATIVE ARTERY OF NATIVE HEART WITH STABLE ANGINA PECTORIS: Primary | ICD-10-CM

## 2022-09-29 LAB
BASOPHILS # BLD AUTO: 0.05 10*3/MM3 (ref 0–0.2)
BASOPHILS NFR BLD AUTO: 0.6 % (ref 0–1.5)
DEPRECATED RDW RBC AUTO: 46.8 FL (ref 37–54)
EOSINOPHIL # BLD AUTO: 0.17 10*3/MM3 (ref 0–0.4)
EOSINOPHIL NFR BLD AUTO: 1.9 % (ref 0.3–6.2)
ERYTHROCYTE [DISTWIDTH] IN BLOOD BY AUTOMATED COUNT: 12.6 % (ref 12.3–15.4)
HCT VFR BLD AUTO: 36.9 % (ref 34–46.6)
HGB BLD-MCNC: 12 G/DL (ref 12–15.9)
IMM GRANULOCYTES # BLD AUTO: 0.03 10*3/MM3 (ref 0–0.05)
IMM GRANULOCYTES NFR BLD AUTO: 0.3 % (ref 0–0.5)
LYMPHOCYTES # BLD AUTO: 0.83 10*3/MM3 (ref 0.7–3.1)
LYMPHOCYTES NFR BLD AUTO: 9.2 % (ref 19.6–45.3)
MCH RBC QN AUTO: 32.8 PG (ref 26.6–33)
MCHC RBC AUTO-ENTMCNC: 32.5 G/DL (ref 31.5–35.7)
MCV RBC AUTO: 100.8 FL (ref 79–97)
MONOCYTES # BLD AUTO: 0.87 10*3/MM3 (ref 0.1–0.9)
MONOCYTES NFR BLD AUTO: 9.6 % (ref 5–12)
NEUTROPHILS NFR BLD AUTO: 7.07 10*3/MM3 (ref 1.7–7)
NEUTROPHILS NFR BLD AUTO: 78.4 % (ref 42.7–76)
NRBC BLD AUTO-RTO: 0 /100 WBC (ref 0–0.2)
PLATELET # BLD AUTO: 299 10*3/MM3 (ref 140–450)
PMV BLD AUTO: 9.3 FL (ref 6–12)
RBC # BLD AUTO: 3.66 10*6/MM3 (ref 3.77–5.28)
WBC NRBC COR # BLD: 9.02 10*3/MM3 (ref 3.4–10.8)

## 2022-09-29 PROCEDURE — 80053 COMPREHEN METABOLIC PANEL: CPT | Performed by: INTERNAL MEDICINE

## 2022-09-29 PROCEDURE — 1111F DSCHRG MED/CURRENT MED MERGE: CPT | Performed by: INTERNAL MEDICINE

## 2022-09-29 PROCEDURE — 85025 COMPLETE CBC W/AUTO DIFF WBC: CPT | Performed by: INTERNAL MEDICINE

## 2022-09-29 PROCEDURE — 99495 TRANSJ CARE MGMT MOD F2F 14D: CPT | Performed by: INTERNAL MEDICINE

## 2022-09-29 RX ORDER — ACETAMINOPHEN 325 MG/1
325 TABLET ORAL
COMMUNITY
Start: 2022-09-23

## 2022-09-29 RX ORDER — LORATADINE 10 MG/1
10 TABLET ORAL
COMMUNITY
Start: 2022-09-22

## 2022-09-29 RX ORDER — CLOPIDOGREL BISULFATE 75 MG/1
75 TABLET ORAL DAILY
COMMUNITY
Start: 2022-09-23

## 2022-09-29 RX ORDER — ASPIRIN 81 MG/1
81 TABLET, COATED ORAL DAILY
COMMUNITY
Start: 2022-09-23

## 2022-09-29 NOTE — PROGRESS NOTES
Transitional Care Follow Up Visit  Subjective     Lianne Ryanne Bardales is a 77 y.o. female who presents for a transitional care management visit.    Within 48 business hours after discharge our office contacted her via telephone to coordinate her care and needs.      I reviewed and discussed the details of that call along with the discharge summary, hospital problems, inpatient lab results, inpatient diagnostic studies, and consultation reports with Lianne.     Current outpatient and discharge medications have been reconciled for the patient.  Reviewed by: Sam Hampton Jr., MD      Date of TCM Phone Call 9/23/2022   [facility]   Date of Discharge 9/23/2022   Discharge Disposition Home or Self Care     Risk for Readmission (LACE) No data recorded    History of Present Illness     Course During Hospital Stay:    Admitted for cath. Patient found to have multivessel coronary artery disease. Complications including hematoma at cath insertion site. Patient also with some soreness at site of hematoma. Patient is taking tylenol to help with discomfort. Patient on statin and plavix and 1/2 tab metoprolol right now. Patient has follow-up cath scheduled in 3 weeks. Patient is holding aldactazide due to some lower Bps. Patient and daughter thinks dyspnea on exertion is worse today than baseline. Patient denies chest pain.      The following portions of the patient's history were reviewed and updated as appropriate: allergies, current medications, past family history, past medical history, past social history, past surgical history, and problem list.      Objective   Vitals:    09/29/22 1325   BP: 113/75   Pulse: 86   Temp: 98.6 °F (37 °C)   SpO2: 97%       Appearance: No acute distress, well-nourished  Head: normocephalic, atraumatic  Eyes: extraocular movements intact, no scleral icterus, no conjunctival injection  Ears, Nose, and Throat: external ears normal, nares patent, moist mucous  membranes  Cardiovascular: regular rate and rhythm. no murmurs, rales, or rhonchi. no edema  Respiratory: breathing comfortably, symmetric chest rise, clear to auscultation bilaterally. No wheezes, rales, or rhonchi.  Neuro: alert and oriented to time, place, and person. Normal gait  Psych: normal mood and affect       Assessment & Plan     Diagnoses and all orders for this visit:    1. Coronary artery disease of native artery of native heart with stable angina pectoris (HCC) (Primary)  Comments:  inc metoprolol to whole tab (50mg) reduce HR. monitor BP. may need to add med such as diltiazem. to ER for worsening SOB. close f/u with cards. Check comprehensive metabolic panel given contrast load. Monitor for anemia as well.   Orders:  -     CBC & Differential  -     Comprehensive Metabolic Panel    2. Need for influenza vaccination  Comments:  defers until f/u appt.     3. Primary hypertension  Comments:  well controlled. monitor closely with inc BB. stay off aldactazide.   Orders:  -     CBC & Differential  -     Comprehensive Metabolic Panel    4. Other hyperlipidemia  Comments:  cont statin    5. Lung nodule  Comments:  chest CT reviewed. follow-up CT schedule din 1/2023.    6. Hematoma  Comments:  stabilized. discussed diagnosis and prognosis.         Medications Discontinued During This Encounter   Medication Reason   • nystatin (MYCOSTATIN) 100,000 unit/mL suspension *Therapy completed   • Diclofenac Sodium (VOLTAREN) 1 % gel gel *Therapy completed   • Budeson-Glycopyrrol-Formoterol (Breztri Aerosphere) 160-9-4.8 MCG/ACT aerosol inhaler *Error   • aspirin 81 MG chewable tablet *Error   • spironolactone-hydrochlorothiazide (ALDACTAZIDE) 25-25 MG tablet *Error       Return in about 4 weeks (around 10/27/2022) for Recheck.           Sam Hampton Jr, MD  09/29/22  14:01 EDT

## 2022-09-30 LAB
ALBUMIN SERPL-MCNC: 4.1 G/DL (ref 3.5–5.2)
ALBUMIN/GLOB SERPL: 1.5 G/DL
ALP SERPL-CCNC: 68 U/L (ref 39–117)
ALT SERPL W P-5'-P-CCNC: 22 U/L (ref 1–33)
ANION GAP SERPL CALCULATED.3IONS-SCNC: 11 MMOL/L (ref 5–15)
AST SERPL-CCNC: 33 U/L (ref 1–32)
BILIRUB SERPL-MCNC: 0.4 MG/DL (ref 0–1.2)
BUN SERPL-MCNC: 14 MG/DL (ref 8–23)
BUN/CREAT SERPL: 15.7 (ref 7–25)
CALCIUM SPEC-SCNC: 9.2 MG/DL (ref 8.6–10.5)
CHLORIDE SERPL-SCNC: 99 MMOL/L (ref 98–107)
CO2 SERPL-SCNC: 25 MMOL/L (ref 22–29)
CREAT SERPL-MCNC: 0.89 MG/DL (ref 0.57–1)
EGFRCR SERPLBLD CKD-EPI 2021: 67.3 ML/MIN/1.73
GLOBULIN UR ELPH-MCNC: 2.7 GM/DL
GLUCOSE SERPL-MCNC: 87 MG/DL (ref 65–99)
POTASSIUM SERPL-SCNC: 4.8 MMOL/L (ref 3.5–5.2)
PROT SERPL-MCNC: 6.8 G/DL (ref 6–8.5)
SODIUM SERPL-SCNC: 135 MMOL/L (ref 136–145)

## 2022-10-18 ENCOUNTER — OFFICE VISIT (OUTPATIENT)
Dept: CARDIAC REHAB | Facility: HOSPITAL | Age: 77
End: 2022-10-18

## 2022-10-18 VITALS
OXYGEN SATURATION: 95 % | DIASTOLIC BLOOD PRESSURE: 82 MMHG | HEIGHT: 65 IN | SYSTOLIC BLOOD PRESSURE: 108 MMHG | WEIGHT: 131.39 LBS | HEART RATE: 76 BPM | BODY MASS INDEX: 21.89 KG/M2

## 2022-10-18 DIAGNOSIS — Z98.61 HISTORY OF PERCUTANEOUS CORONARY INTERVENTION: Primary | ICD-10-CM

## 2022-10-18 PROCEDURE — 93798 PHYS/QHP OP CAR RHAB W/ECG: CPT

## 2022-10-18 RX ORDER — SPIRONOLACTONE 25 MG/1
25 TABLET ORAL DAILY
COMMUNITY

## 2022-10-18 NOTE — PROGRESS NOTES
Phase II and III Education    Discipline Teaching:  Cardiac Rehab Phase II [x]      Cardiac Rehab Phase III []      Pulmonary Rehab II []      Pulmonary Rehab III []      Peripheral Artery Disease []        Class Given:  Asthma Management []      Beginners Cardiac Rehab [x]      Beginners Pulmonary Rehab []      CAD I []      CAD II []      CHF []      Diabetes Mellitus []     Diet & Cholesterol []     Diet Consult []      Energy Conservation []     Exercise [x]     Grocery Store Tour []     Harmonica Therapy []     High Blood Pressure []     Living with COPD []     Medication Safety []     Peripheral Artery Disease []     S & S of Infection [x]      Stress []        Education Topics:  Angina [x]      Arrhythmias []      Asthma Management []      Beginning Cardiac Rehab [x]      Blood Pressure [x]     Blood Sugar []     Breathing Retrainment []     CHF []     Cooking []     Daily Weight []     Diabetes Mellitus []      Diet [x]     Energy Conservation []     Exercise [x]      Foot Care []      Grocery Store Tour []      Heart Disease [x]      Heart Function []      Incision Care []     Living with COPD []     Medication Safety []     PAD Symptoms/Treatment []     Reconditioning Exercises []     S & S Infection [x]     Smoking Consult []     Stress Management []     Test Results []       Teaching Aids:  Video [x]      PAD Handout []      Pulmonary Workbook []      CAD Teaching Packet [x]      Stress Teaching Packet []     Exercise Teaching Packet [x]      Diet Teaching Packet [x]      CHF Teaching Packet []      Blood Pressure Teaching Packet [x]      Smoking Cessation Packet []      Medication Safety Handout []      Pflex Training []      Diabetes Teaching Packet []      Harmonica Therapy Packet []        Teaching Method:  Discussion [x]      Written Information [x]      Audio Visual []      Demonstration []        Teaching Recipient:  Patient [x]      Family []      Friend []      Legal Guardian []      Primary  Care Giver []      Significant Other []        Barriers To Learning:  None [x]      Auditory []      Cultural []      Emotional []      Financial []      Language []    Mental []      Motivation []      Physical []      Reading Skills []      Rastafari []      Verbal/Cognitive []      Visual []      Written/Cognitive []        Teaching Response:  Verified Via Teach back [x]      Return Demo Via Teach Back []      Reinforcement Needed []      Unable to Return Demo []      Unable to Comprehend []      Declines Teaching  []        Additional Education Topics:    Follow Up Instruction Comment:      Pt tolerated exercises see scanned report.

## 2022-10-18 NOTE — PROGRESS NOTES
Chief Complaint  Cardiac Rehab Phase II    Lianne Bardales presents to [facility] CARDI[facility]ULMON[facility]    Physical Exam  Vitals reviewed.   Constitutional:       Appearance: Normal appearance. She is normal weight.   Cardiovascular:      Rate and Rhythm: Normal rate and regular rhythm.      Pulses: Normal pulses.      Heart sounds: Normal heart sounds, S1 normal and S2 normal.   Pulmonary:      Effort: Pulmonary effort is normal.      Breath sounds: Normal breath sounds.   Musculoskeletal:         General: Normal range of motion.      Cervical back: Normal range of motion.      Right lower leg: No edema.      Left lower leg: No edema.   Skin:     General: Skin is warm and dry.   Neurological:      General: No focal deficit present.      Mental Status: She is alert and oriented to person, place, and time. Mental status is at baseline.   Psychiatric:         Attention and Perception: Attention and perception normal.         Mood and Affect: Mood normal.         Speech: Speech normal.         Behavior: Behavior normal. Behavior is cooperative.         Thought Content: Thought content normal.         Judgment: Judgment normal.        Result Review :          Assessment and Plan    Diagnoses and all orders for this visit:    1. History of percutaneous coronary intervention (Primary)        Garo Montes RN

## 2022-10-20 ENCOUNTER — TREATMENT (OUTPATIENT)
Dept: CARDIAC REHAB | Facility: HOSPITAL | Age: 77
End: 2022-10-20

## 2022-10-20 DIAGNOSIS — Z98.61 HISTORY OF PERCUTANEOUS CORONARY INTERVENTION: Primary | ICD-10-CM

## 2022-10-20 PROCEDURE — 93798 PHYS/QHP OP CAR RHAB W/ECG: CPT

## 2022-10-25 ENCOUNTER — APPOINTMENT (OUTPATIENT)
Dept: CARDIAC REHAB | Facility: HOSPITAL | Age: 77
End: 2022-10-25

## 2022-10-25 ENCOUNTER — TREATMENT (OUTPATIENT)
Dept: CARDIAC REHAB | Facility: HOSPITAL | Age: 77
End: 2022-10-25

## 2022-10-25 DIAGNOSIS — Z98.61 HISTORY OF PERCUTANEOUS CORONARY INTERVENTION: Primary | ICD-10-CM

## 2022-10-25 PROCEDURE — 93798 PHYS/QHP OP CAR RHAB W/ECG: CPT

## 2022-10-27 ENCOUNTER — APPOINTMENT (OUTPATIENT)
Dept: CARDIAC REHAB | Facility: HOSPITAL | Age: 77
End: 2022-10-27

## 2022-11-01 ENCOUNTER — TREATMENT (OUTPATIENT)
Dept: CARDIAC REHAB | Facility: HOSPITAL | Age: 77
End: 2022-11-01

## 2022-11-01 DIAGNOSIS — Z98.61 HISTORY OF PERCUTANEOUS CORONARY INTERVENTION: Primary | ICD-10-CM

## 2022-11-01 PROCEDURE — 93798 PHYS/QHP OP CAR RHAB W/ECG: CPT

## 2022-11-03 ENCOUNTER — TREATMENT (OUTPATIENT)
Dept: CARDIAC REHAB | Facility: HOSPITAL | Age: 77
End: 2022-11-03

## 2022-11-03 DIAGNOSIS — Z98.61 HISTORY OF PERCUTANEOUS CORONARY INTERVENTION: Primary | ICD-10-CM

## 2022-11-03 PROCEDURE — 93798 PHYS/QHP OP CAR RHAB W/ECG: CPT

## 2022-11-08 ENCOUNTER — TREATMENT (OUTPATIENT)
Dept: CARDIAC REHAB | Facility: HOSPITAL | Age: 77
End: 2022-11-08

## 2022-11-08 DIAGNOSIS — Z98.61 HISTORY OF PERCUTANEOUS CORONARY INTERVENTION: Primary | ICD-10-CM

## 2022-11-08 PROCEDURE — 93798 PHYS/QHP OP CAR RHAB W/ECG: CPT

## 2022-11-10 ENCOUNTER — TREATMENT (OUTPATIENT)
Dept: CARDIAC REHAB | Facility: HOSPITAL | Age: 77
End: 2022-11-10

## 2022-11-10 DIAGNOSIS — Z98.61 HISTORY OF PERCUTANEOUS CORONARY INTERVENTION: Primary | ICD-10-CM

## 2022-11-10 PROCEDURE — 93798 PHYS/QHP OP CAR RHAB W/ECG: CPT

## 2022-11-15 ENCOUNTER — APPOINTMENT (OUTPATIENT)
Dept: CARDIAC REHAB | Facility: HOSPITAL | Age: 77
End: 2022-11-15

## 2022-11-16 ENCOUNTER — TREATMENT (OUTPATIENT)
Dept: CARDIAC REHAB | Facility: HOSPITAL | Age: 77
End: 2022-11-16

## 2022-11-16 DIAGNOSIS — Z98.61 HISTORY OF PERCUTANEOUS CORONARY INTERVENTION: Primary | ICD-10-CM

## 2022-11-16 PROCEDURE — 93798 PHYS/QHP OP CAR RHAB W/ECG: CPT

## 2022-11-17 ENCOUNTER — HOSPITAL ENCOUNTER (OUTPATIENT)
Dept: RESPIRATORY THERAPY | Facility: HOSPITAL | Age: 77
Discharge: HOME | End: 2022-11-17
Admitting: INTERNAL MEDICINE

## 2022-11-17 ENCOUNTER — APPOINTMENT (OUTPATIENT)
Dept: CARDIAC REHAB | Facility: HOSPITAL | Age: 77
End: 2022-11-17

## 2022-11-17 DIAGNOSIS — J44.9 CHRONIC OBSTRUCTIVE PULMONARY DISEASE, UNSPECIFIED COPD TYPE: ICD-10-CM

## 2022-11-17 PROCEDURE — 94726 PLETHYSMOGRAPHY LUNG VOLUMES: CPT | Performed by: INTERNAL MEDICINE

## 2022-11-17 PROCEDURE — 94060 EVALUATION OF WHEEZING: CPT | Performed by: INTERNAL MEDICINE

## 2022-11-17 PROCEDURE — 94060 EVALUATION OF WHEEZING: CPT

## 2022-11-17 PROCEDURE — 94726 PLETHYSMOGRAPHY LUNG VOLUMES: CPT

## 2022-11-17 RX ORDER — ALBUTEROL SULFATE 2.5 MG/3ML
2.5 SOLUTION RESPIRATORY (INHALATION) ONCE
Status: COMPLETED | OUTPATIENT
Start: 2022-11-17 | End: 2022-11-17

## 2022-11-17 RX ADMIN — ALBUTEROL SULFATE 2.5 MG: 2.5 SOLUTION RESPIRATORY (INHALATION) at 12:55

## 2022-11-18 ENCOUNTER — TREATMENT (OUTPATIENT)
Dept: CARDIAC REHAB | Facility: HOSPITAL | Age: 77
End: 2022-11-18

## 2022-11-18 DIAGNOSIS — Z98.61 HISTORY OF PERCUTANEOUS CORONARY INTERVENTION: Primary | ICD-10-CM

## 2022-11-18 PROCEDURE — 93798 PHYS/QHP OP CAR RHAB W/ECG: CPT

## 2022-11-22 ENCOUNTER — TREATMENT (OUTPATIENT)
Dept: CARDIAC REHAB | Facility: HOSPITAL | Age: 77
End: 2022-11-22

## 2022-11-22 DIAGNOSIS — Z98.61 HISTORY OF PERCUTANEOUS CORONARY INTERVENTION: Primary | ICD-10-CM

## 2022-11-22 PROCEDURE — 93798 PHYS/QHP OP CAR RHAB W/ECG: CPT

## 2022-11-24 ENCOUNTER — APPOINTMENT (OUTPATIENT)
Dept: CARDIAC REHAB | Facility: HOSPITAL | Age: 77
End: 2022-11-24

## 2022-11-29 ENCOUNTER — TREATMENT (OUTPATIENT)
Dept: CARDIAC REHAB | Facility: HOSPITAL | Age: 77
End: 2022-11-29

## 2022-11-29 DIAGNOSIS — Z98.61 HISTORY OF PERCUTANEOUS CORONARY INTERVENTION: Primary | ICD-10-CM

## 2022-11-29 PROCEDURE — 93798 PHYS/QHP OP CAR RHAB W/ECG: CPT

## 2022-11-30 ENCOUNTER — TELEPHONE (OUTPATIENT)
Dept: INTERNAL MEDICINE | Facility: CLINIC | Age: 77
End: 2022-11-30

## 2022-11-30 NOTE — TELEPHONE ENCOUNTER
----- Message from Sam Hampton Jr., MD sent at 11/30/2022  8:37 AM EST -----  Obstructive lung disease and reduced diffusion capacity. Continue follow-up with pulmonary for further management

## 2022-12-01 ENCOUNTER — TREATMENT (OUTPATIENT)
Dept: CARDIAC REHAB | Facility: HOSPITAL | Age: 77
End: 2022-12-01
Payer: MEDICARE

## 2022-12-01 ENCOUNTER — OFFICE VISIT (OUTPATIENT)
Dept: PULMONOLOGY | Facility: CLINIC | Age: 77
End: 2022-12-01

## 2022-12-01 VITALS
DIASTOLIC BLOOD PRESSURE: 62 MMHG | HEART RATE: 85 BPM | RESPIRATION RATE: 18 BRPM | WEIGHT: 134.4 LBS | BODY MASS INDEX: 22.39 KG/M2 | OXYGEN SATURATION: 100 % | SYSTOLIC BLOOD PRESSURE: 130 MMHG | TEMPERATURE: 97.4 F | HEIGHT: 65 IN

## 2022-12-01 DIAGNOSIS — J44.9 CHRONIC OBSTRUCTIVE PULMONARY DISEASE, UNSPECIFIED COPD TYPE: Primary | ICD-10-CM

## 2022-12-01 DIAGNOSIS — R06.09 CHRONIC DYSPNEA: ICD-10-CM

## 2022-12-01 DIAGNOSIS — R91.8 LUNG NODULES: ICD-10-CM

## 2022-12-01 DIAGNOSIS — Z98.61 HISTORY OF PERCUTANEOUS CORONARY INTERVENTION: Primary | ICD-10-CM

## 2022-12-01 PROCEDURE — 93798 PHYS/QHP OP CAR RHAB W/ECG: CPT

## 2022-12-01 PROCEDURE — 99213 OFFICE O/P EST LOW 20 MIN: CPT | Performed by: NURSE PRACTITIONER

## 2022-12-01 RX ORDER — ISOSORBIDE MONONITRATE 30 MG/1
30 TABLET, EXTENDED RELEASE ORAL DAILY
COMMUNITY
Start: 2022-10-18

## 2022-12-01 RX ORDER — SPIRONOLACTONE AND HYDROCHLOROTHIAZIDE 25; 25 MG/1; MG/1
1 TABLET ORAL DAILY
COMMUNITY
Start: 2022-10-18 | End: 2023-01-03

## 2022-12-01 RX ORDER — BUDESONIDE, GLYCOPYRROLATE, AND FORMOTEROL FUMARATE 160; 9; 4.8 UG/1; UG/1; UG/1
2 AEROSOL, METERED RESPIRATORY (INHALATION) 2 TIMES DAILY
Qty: 4 EACH | Refills: 0 | COMMUNITY
Start: 2022-12-01 | End: 2022-12-02

## 2022-12-06 ENCOUNTER — TREATMENT (OUTPATIENT)
Dept: CARDIAC REHAB | Facility: HOSPITAL | Age: 77
End: 2022-12-06
Payer: MEDICARE

## 2022-12-06 DIAGNOSIS — Z98.61 HISTORY OF PERCUTANEOUS CORONARY INTERVENTION: Primary | ICD-10-CM

## 2022-12-06 PROCEDURE — 93798 PHYS/QHP OP CAR RHAB W/ECG: CPT

## 2022-12-08 ENCOUNTER — TREATMENT (OUTPATIENT)
Dept: CARDIAC REHAB | Facility: HOSPITAL | Age: 77
End: 2022-12-08
Payer: MEDICARE

## 2022-12-08 DIAGNOSIS — Z98.61 HISTORY OF PERCUTANEOUS CORONARY INTERVENTION: Primary | ICD-10-CM

## 2022-12-08 PROCEDURE — 93798 PHYS/QHP OP CAR RHAB W/ECG: CPT

## 2022-12-13 ENCOUNTER — TREATMENT (OUTPATIENT)
Dept: CARDIAC REHAB | Facility: HOSPITAL | Age: 77
End: 2022-12-13
Payer: MEDICARE

## 2022-12-13 DIAGNOSIS — Z98.61 HISTORY OF PERCUTANEOUS CORONARY INTERVENTION: Primary | ICD-10-CM

## 2022-12-13 PROCEDURE — 93798 PHYS/QHP OP CAR RHAB W/ECG: CPT

## 2022-12-15 ENCOUNTER — TREATMENT (OUTPATIENT)
Dept: CARDIAC REHAB | Facility: HOSPITAL | Age: 77
End: 2022-12-15
Payer: MEDICARE

## 2022-12-15 DIAGNOSIS — Z98.61 HISTORY OF PERCUTANEOUS CORONARY INTERVENTION: Primary | ICD-10-CM

## 2022-12-15 PROCEDURE — 93798 PHYS/QHP OP CAR RHAB W/ECG: CPT

## 2022-12-16 ENCOUNTER — TELEPHONE (OUTPATIENT)
Dept: PULMONOLOGY | Facility: CLINIC | Age: 77
End: 2022-12-16

## 2022-12-16 NOTE — TELEPHONE ENCOUNTER
Pt called stated she had an office visit with Annabelle and Annabelle placed order for flutter valve or acapella.   Pt stated it has been 2 weeks and she has not heard anything.

## 2022-12-19 NOTE — TELEPHONE ENCOUNTER
Spoke with Roseann from [facility], there was an issue with the order.  She will be set up this week. Roseann is calling her.

## 2022-12-20 ENCOUNTER — APPOINTMENT (OUTPATIENT)
Dept: CARDIAC REHAB | Facility: HOSPITAL | Age: 77
End: 2022-12-20
Payer: MEDICARE

## 2022-12-21 DIAGNOSIS — J44.9 CHRONIC OBSTRUCTIVE PULMONARY DISEASE, UNSPECIFIED COPD TYPE: ICD-10-CM

## 2022-12-21 RX ORDER — ALBUTEROL SULFATE 90 UG/1
2 AEROSOL, METERED RESPIRATORY (INHALATION) EVERY 4 HOURS PRN
Qty: 18 G | Refills: 3 | Status: SHIPPED | OUTPATIENT
Start: 2022-12-21

## 2022-12-22 ENCOUNTER — APPOINTMENT (OUTPATIENT)
Dept: CARDIAC REHAB | Facility: HOSPITAL | Age: 77
End: 2022-12-22
Payer: MEDICARE

## 2022-12-27 ENCOUNTER — APPOINTMENT (OUTPATIENT)
Dept: CARDIAC REHAB | Facility: HOSPITAL | Age: 77
End: 2022-12-27
Payer: MEDICARE

## 2022-12-29 ENCOUNTER — TREATMENT (OUTPATIENT)
Dept: CARDIAC REHAB | Facility: HOSPITAL | Age: 77
End: 2022-12-29
Payer: MEDICARE

## 2022-12-29 DIAGNOSIS — Z98.61 HISTORY OF PERCUTANEOUS CORONARY INTERVENTION: Primary | ICD-10-CM

## 2022-12-29 PROCEDURE — 93798 PHYS/QHP OP CAR RHAB W/ECG: CPT

## 2023-01-01 DIAGNOSIS — I10 ESSENTIAL (PRIMARY) HYPERTENSION: ICD-10-CM

## 2023-01-03 ENCOUNTER — APPOINTMENT (OUTPATIENT)
Dept: CARDIAC REHAB | Facility: HOSPITAL | Age: 78
End: 2023-01-03
Payer: MEDICARE

## 2023-01-03 RX ORDER — SPIRONOLACTONE AND HYDROCHLOROTHIAZIDE 25; 25 MG/1; MG/1
1 TABLET ORAL DAILY
Qty: 90 TABLET | Refills: 2 | Status: SHIPPED | OUTPATIENT
Start: 2023-01-03

## 2023-01-05 ENCOUNTER — TREATMENT (OUTPATIENT)
Dept: CARDIAC REHAB | Facility: HOSPITAL | Age: 78
End: 2023-01-05
Payer: MEDICARE

## 2023-01-05 DIAGNOSIS — Z98.61 HISTORY OF PERCUTANEOUS CORONARY INTERVENTION: Primary | ICD-10-CM

## 2023-01-05 PROCEDURE — 93798 PHYS/QHP OP CAR RHAB W/ECG: CPT

## 2023-01-06 ENCOUNTER — HOSPITAL ENCOUNTER (OUTPATIENT)
Dept: CT IMAGING | Facility: HOSPITAL | Age: 78
Discharge: HOME | End: 2023-01-06
Admitting: NURSE PRACTITIONER
Payer: MEDICARE

## 2023-01-06 DIAGNOSIS — R91.1 LUNG NODULE: ICD-10-CM

## 2023-01-06 PROCEDURE — 71250 CT THORAX DX C-: CPT

## 2023-01-10 ENCOUNTER — APPOINTMENT (OUTPATIENT)
Dept: CARDIAC REHAB | Facility: HOSPITAL | Age: 78
End: 2023-01-10
Payer: MEDICARE

## 2023-01-12 ENCOUNTER — APPOINTMENT (OUTPATIENT)
Dept: CARDIAC REHAB | Facility: HOSPITAL | Age: 78
End: 2023-01-12
Payer: MEDICARE

## 2023-01-17 ENCOUNTER — APPOINTMENT (OUTPATIENT)
Dept: CARDIAC REHAB | Facility: HOSPITAL | Age: 78
End: 2023-01-17
Payer: MEDICARE

## 2023-01-18 ENCOUNTER — OFFICE VISIT (OUTPATIENT)
Dept: PULMONOLOGY | Facility: CLINIC | Age: 78
End: 2023-01-18
Payer: MEDICARE

## 2023-01-18 VITALS
RESPIRATION RATE: 18 BRPM | HEART RATE: 56 BPM | SYSTOLIC BLOOD PRESSURE: 134 MMHG | OXYGEN SATURATION: 93 % | DIASTOLIC BLOOD PRESSURE: 77 MMHG | BODY MASS INDEX: 21.66 KG/M2 | TEMPERATURE: 95.7 F | HEIGHT: 65 IN | WEIGHT: 130 LBS

## 2023-01-18 DIAGNOSIS — J44.9 CHRONIC OBSTRUCTIVE PULMONARY DISEASE, UNSPECIFIED COPD TYPE: Primary | ICD-10-CM

## 2023-01-18 DIAGNOSIS — R91.8 LUNG NODULES: ICD-10-CM

## 2023-01-18 PROCEDURE — 99214 OFFICE O/P EST MOD 30 MIN: CPT | Performed by: INTERNAL MEDICINE

## 2023-01-18 NOTE — PROGRESS NOTES
Pulmonary Office Follow-up    Subjective     Lianne Bardales is seen today at the office for   Chief Complaint   Patient presents with   • Results     CT results   • Follow-up     6 Week follow up         HPI  Lianne Bardales is a 77 y.o. female with a PMH significant for tobacco abuse and COPD with coronary artery disease and status post stent placement presents for follow-up she feels much better she does still have dyspnea on exertion with wheezing but denies any significant expectoration patient denies any chest pain fever or hemoptysis      Tobacco use history:  Former smoker      Patient Active Problem List   Diagnosis   • Acid reflux   • Anemia, iron deficiency   • Asthma   • COPD (chronic obstructive pulmonary disease) (HCC)   • Gall stones   • Chronic dyspnea   • History of fungal infection   • Lung nodule   • Tobacco abuse, in remission   • Primary hypertension   • Other hyperlipidemia   • Coronary artery disease of native artery of native heart with stable angina pectoris (HCC)       Review of Systems  Review of Systems   Respiratory: Positive for shortness of breath and wheezing.    All other systems reviewed and are negative.    As described in the HPI. Otherwise, remainder of ROS (14 systems) were negative.    Medications, Allergies, Social, and Family Histories reviewed as per EMR.    Objective     Vitals:    01/18/23 1056   BP: 134/77   Pulse: 56   Resp: 18   Temp: 95.7 °F (35.4 °C)   SpO2: 93%         01/18/23  1056   Weight: 59 kg (130 lb)       Physical Exam  Vitals and nursing note reviewed.   Constitutional:       Appearance: Normal appearance.   HENT:      Head: Normocephalic and atraumatic.      Nose: Nose normal.      Mouth/Throat:      Mouth: Mucous membranes are moist.      Pharynx: Oropharynx is clear.   Eyes:      Conjunctiva/sclera: Conjunctivae normal.      Pupils: Pupils are equal, round, and reactive to light.   Cardiovascular:      Rate and Rhythm: Normal rate and regular  rhythm.      Pulses: Normal pulses.      Heart sounds: Normal heart sounds.   Pulmonary:      Effort: Pulmonary effort is normal.      Breath sounds: Rhonchi present.   Abdominal:      General: Abdomen is flat. Bowel sounds are normal.      Palpations: Abdomen is soft.   Musculoskeletal:         General: Normal range of motion.      Cervical back: Normal range of motion and neck supple.   Skin:     General: Skin is warm.      Capillary Refill: Capillary refill takes less than 2 seconds.   Neurological:      General: No focal deficit present.      Mental Status: She is alert.   Psychiatric:         Mood and Affect: Mood normal.         CT Chest Without Contrast Diagnostic    Result Date: 1/7/2023    1. Previously identified left-sided lung nodules have resolved. 2. Stable scarring and suture ring in the right upper lobe from prior wedge resection. 3. Emphysema, atherosclerosis and coronary artery disease.     QUETA REDDY MD       Electronically Signed and Approved By: QUETA REDDY MD on 1/07/2023 at 5:05                Assessment & Plan     Diagnoses and all orders for this visit:    1. Chronic obstructive pulmonary disease, unspecified COPD type (HCC) (Primary)    2. Lung nodules    Other orders  -     Fluticasone-Umeclidin-Vilant 200-62.5-25 MCG/ACT aerosol powder ; Inhale 1 puff Daily.  Dispense: 1 each; Refill: 5         Discussion/ Recommendations:   PFT and CT results were reviewed with the patient  She was reassured  Patient is advised regular exercise  She has already quit smoking  Will place her on Trelegy once a month as her insurance coverage is better with that  Vaccinations discussed and recommended    BMI is within normal parameters. No other follow-up for BMI required.        Return in about 3 months (around 4/18/2023).          This document has been electronically signed by Mathew Mcgovern MD on January 18, 2023 11:10 EST

## 2023-01-19 ENCOUNTER — APPOINTMENT (OUTPATIENT)
Dept: CARDIAC REHAB | Facility: HOSPITAL | Age: 78
End: 2023-01-19
Payer: MEDICARE

## 2023-01-24 ENCOUNTER — APPOINTMENT (OUTPATIENT)
Dept: CARDIAC REHAB | Facility: HOSPITAL | Age: 78
End: 2023-01-24
Payer: MEDICARE

## 2023-01-26 ENCOUNTER — APPOINTMENT (OUTPATIENT)
Dept: CARDIAC REHAB | Facility: HOSPITAL | Age: 78
End: 2023-01-26
Payer: MEDICARE

## 2023-01-31 ENCOUNTER — APPOINTMENT (OUTPATIENT)
Dept: CARDIAC REHAB | Facility: HOSPITAL | Age: 78
End: 2023-01-31
Payer: MEDICARE

## 2023-02-06 ENCOUNTER — OFFICE VISIT (OUTPATIENT)
Dept: INTERNAL MEDICINE | Facility: CLINIC | Age: 78
End: 2023-02-06
Payer: MEDICARE

## 2023-02-06 VITALS
HEART RATE: 62 BPM | WEIGHT: 131.8 LBS | BODY MASS INDEX: 21.96 KG/M2 | HEIGHT: 65 IN | SYSTOLIC BLOOD PRESSURE: 121 MMHG | OXYGEN SATURATION: 95 % | DIASTOLIC BLOOD PRESSURE: 77 MMHG | TEMPERATURE: 97.4 F

## 2023-02-06 DIAGNOSIS — I25.118 CORONARY ARTERY DISEASE OF NATIVE ARTERY OF NATIVE HEART WITH STABLE ANGINA PECTORIS: ICD-10-CM

## 2023-02-06 DIAGNOSIS — I10 PRIMARY HYPERTENSION: Primary | ICD-10-CM

## 2023-02-06 DIAGNOSIS — J44.9 CHRONIC OBSTRUCTIVE PULMONARY DISEASE, UNSPECIFIED COPD TYPE: ICD-10-CM

## 2023-02-06 DIAGNOSIS — R11.0 NAUSEA: ICD-10-CM

## 2023-02-06 DIAGNOSIS — E78.49 OTHER HYPERLIPIDEMIA: ICD-10-CM

## 2023-02-06 DIAGNOSIS — Z23 NEED FOR PNEUMOCOCCAL VACCINATION: ICD-10-CM

## 2023-02-06 LAB
ALBUMIN SERPL-MCNC: 4.5 G/DL (ref 3.5–5.2)
ALBUMIN/GLOB SERPL: 2 G/DL
ALP SERPL-CCNC: 50 U/L (ref 39–117)
ALT SERPL W P-5'-P-CCNC: 30 U/L (ref 1–33)
ANION GAP SERPL CALCULATED.3IONS-SCNC: 10 MMOL/L (ref 5–15)
AST SERPL-CCNC: 38 U/L (ref 1–32)
BASOPHILS # BLD AUTO: 0.05 10*3/MM3 (ref 0–0.2)
BASOPHILS NFR BLD AUTO: 0.8 % (ref 0–1.5)
BILIRUB SERPL-MCNC: 0.4 MG/DL (ref 0–1.2)
BUN SERPL-MCNC: 21 MG/DL (ref 8–23)
BUN/CREAT SERPL: 22.6 (ref 7–25)
CALCIUM SPEC-SCNC: 9.6 MG/DL (ref 8.6–10.5)
CHLORIDE SERPL-SCNC: 99 MMOL/L (ref 98–107)
CHOLEST SERPL-MCNC: 145 MG/DL (ref 0–200)
CO2 SERPL-SCNC: 26 MMOL/L (ref 22–29)
CREAT SERPL-MCNC: 0.93 MG/DL (ref 0.57–1)
DEPRECATED RDW RBC AUTO: 46.2 FL (ref 37–54)
EGFRCR SERPLBLD CKD-EPI 2021: 63.8 ML/MIN/1.73
EOSINOPHIL # BLD AUTO: 0.07 10*3/MM3 (ref 0–0.4)
EOSINOPHIL NFR BLD AUTO: 1.1 % (ref 0.3–6.2)
ERYTHROCYTE [DISTWIDTH] IN BLOOD BY AUTOMATED COUNT: 12.9 % (ref 12.3–15.4)
GLOBULIN UR ELPH-MCNC: 2.3 GM/DL
GLUCOSE SERPL-MCNC: 99 MG/DL (ref 65–99)
HCT VFR BLD AUTO: 40.1 % (ref 34–46.6)
HDLC SERPL-MCNC: 80 MG/DL (ref 40–60)
HGB BLD-MCNC: 13.6 G/DL (ref 12–15.9)
IMM GRANULOCYTES # BLD AUTO: 0.02 10*3/MM3 (ref 0–0.05)
IMM GRANULOCYTES NFR BLD AUTO: 0.3 % (ref 0–0.5)
LDLC SERPL CALC-MCNC: 51 MG/DL (ref 0–100)
LDLC/HDLC SERPL: 0.64 {RATIO}
LIPASE SERPL-CCNC: 29 U/L (ref 13–60)
LYMPHOCYTES # BLD AUTO: 0.97 10*3/MM3 (ref 0.7–3.1)
LYMPHOCYTES NFR BLD AUTO: 14.8 % (ref 19.6–45.3)
MCH RBC QN AUTO: 33 PG (ref 26.6–33)
MCHC RBC AUTO-ENTMCNC: 33.9 G/DL (ref 31.5–35.7)
MCV RBC AUTO: 97.3 FL (ref 79–97)
MONOCYTES # BLD AUTO: 0.7 10*3/MM3 (ref 0.1–0.9)
MONOCYTES NFR BLD AUTO: 10.7 % (ref 5–12)
NEUTROPHILS NFR BLD AUTO: 4.76 10*3/MM3 (ref 1.7–7)
NEUTROPHILS NFR BLD AUTO: 72.3 % (ref 42.7–76)
NRBC BLD AUTO-RTO: 0.2 /100 WBC (ref 0–0.2)
PLATELET # BLD AUTO: 211 10*3/MM3 (ref 140–450)
PMV BLD AUTO: 10 FL (ref 6–12)
POTASSIUM SERPL-SCNC: 4.1 MMOL/L (ref 3.5–5.2)
PROT SERPL-MCNC: 6.8 G/DL (ref 6–8.5)
RBC # BLD AUTO: 4.12 10*6/MM3 (ref 3.77–5.28)
SODIUM SERPL-SCNC: 135 MMOL/L (ref 136–145)
TRIGL SERPL-MCNC: 71 MG/DL (ref 0–150)
VLDLC SERPL-MCNC: 14 MG/DL (ref 5–40)
WBC NRBC COR # BLD: 6.57 10*3/MM3 (ref 3.4–10.8)

## 2023-02-06 PROCEDURE — G0009 ADMIN PNEUMOCOCCAL VACCINE: HCPCS | Performed by: INTERNAL MEDICINE

## 2023-02-06 PROCEDURE — 80053 COMPREHEN METABOLIC PANEL: CPT | Performed by: INTERNAL MEDICINE

## 2023-02-06 PROCEDURE — 85025 COMPLETE CBC W/AUTO DIFF WBC: CPT | Performed by: INTERNAL MEDICINE

## 2023-02-06 PROCEDURE — 80061 LIPID PANEL: CPT | Performed by: INTERNAL MEDICINE

## 2023-02-06 PROCEDURE — 83690 ASSAY OF LIPASE: CPT | Performed by: INTERNAL MEDICINE

## 2023-02-06 PROCEDURE — 90677 PCV20 VACCINE IM: CPT | Performed by: INTERNAL MEDICINE

## 2023-02-06 PROCEDURE — 99214 OFFICE O/P EST MOD 30 MIN: CPT | Performed by: INTERNAL MEDICINE

## 2023-02-06 RX ORDER — FLUTICASONE FUROATE, UMECLIDINIUM BROMIDE AND VILANTEROL TRIFENATATE 100; 62.5; 25 UG/1; UG/1; UG/1
1 POWDER RESPIRATORY (INHALATION)
COMMUNITY

## 2023-02-06 RX ORDER — SCOLOPAMINE TRANSDERMAL SYSTEM 1 MG/1
1 PATCH, EXTENDED RELEASE TRANSDERMAL
Qty: 30 PATCH | Refills: 1 | Status: SHIPPED | OUTPATIENT
Start: 2023-02-06

## 2023-02-06 RX ORDER — DIPHENHYDRAMINE, LIDOCAINE, NYSTATIN
5 KIT ORAL 4 TIMES DAILY
Qty: 60 ML | Refills: 1 | Status: SHIPPED | OUTPATIENT
Start: 2023-02-06

## 2023-02-06 RX ORDER — ONDANSETRON 4 MG/1
4 TABLET, FILM COATED ORAL EVERY 8 HOURS PRN
Qty: 90 TABLET | Refills: 1 | Status: SHIPPED | OUTPATIENT
Start: 2023-02-06

## 2023-02-06 NOTE — PROGRESS NOTES
Chief Complaint  Follow-up and Nausea    Subjective          Lianne Bardales presents to [facility]  History of Present Illness  Patient with intermittent vomiting and nausea. Patient cannot associate with eating. Patient reports zofran does not help as much as she would prefer.   hypertension- patient is tolerating higher dosage of metoprolol.   coronary artery disease- patient with recent catheterization with no more than 50% noted. Patient is on BB and imdur as well as DAPT as recommended by cardiology.   COPD- patient does report recurrent sore sin her mouth. She is now on trelegy to help.     Current Outpatient Medications   Medication Instructions   • acetaminophen (TYLENOL) 325 mg   • albuterol sulfate  (90 Base) MCG/ACT inhaler 2 puffs, Inhalation, Every 4 Hours PRN   • Aspirin Low Dose 81 mg, Oral, Daily   • Cetirizine HCl 10 MG capsule Oral   • Cholecalciferol (Vitamin D3) 250 MCG (89113 UT) tablet Oral   • clopidogrel (PLAVIX) 75 mg, Oral, Daily   • esomeprazole (NEXIUM) 40 mg, Oral, Every Morning Before Breakfast   • Fluticasone-Umeclidin-Vilant (Trelegy Ellipta) 100-62.5-25 MCG/ACT inhaler 1 puff, Inhalation, Daily - RT   • isosorbide mononitrate (IMDUR) 30 mg, Oral, Daily   • loratadine (CLARITIN) 10 mg   • metoprolol succinate XL (TOPROL-XL) 50 mg, Oral, Daily   • nystatin susp + lidocaine viscous (MAGIC MOUTHWASH) oral suspension 5 mL, Swish & Swallow, 4 Times Daily   • ondansetron (ZOFRAN) 4 mg, Oral, Every 8 Hours PRN   • rosuvastatin (CRESTOR) 40 MG tablet TAKE 1 TABLET BY MOUTH EVERY NIGHT AT BEDTIME.   • Scopolamine 1 MG/3DAYS patch 1 patch, Transdermal, Every 72 Hours   • spironolactone (ALDACTONE) 25 mg, Oral, Daily   • spironolactone-hydrochlorothiazide (ALDACTAZIDE) 25-25 MG tablet 1 tablet, Oral, Daily       The following portions of the patient's history were reviewed and updated as appropriate: allergies, current medications, past  "family history, past medical history, past social history, past surgical history, and problem list.    Objective   Vital Signs:   /77 (BP Location: Left arm, Patient Position: Sitting, Cuff Size: Adult)   Pulse 62   Temp 97.4 °F (36.3 °C) (Temporal)   Ht 163.8 cm (64.5\")   Wt 59.8 kg (131 lb 12.8 oz)   SpO2 95%   BMI 22.27 kg/m²     Wt Readings from Last 3 Encounters:   02/06/23 59.8 kg (131 lb 12.8 oz)   01/18/23 59 kg (130 lb)   12/01/22 61 kg (134 lb 6.4 oz)     BP Readings from Last 3 Encounters:   02/06/23 121/77   01/18/23 134/77   12/01/22 130/62     Physical Exam   Appearance: No acute distress, well-nourished  Head: normocephalic, atraumatic  Eyes: extraocular movements intact, no scleral icterus, no conjunctival injection  Ears, Nose, and Throat: external ears normal, nares patent, moist mucous membranes  Cardiovascular: regular rate and rhythm. no murmurs, rubs, or gallops. no edema  Respiratory: breathing comfortably, symmetric chest rise, clear to auscultation bilaterally. No wheezes, rales, or rhonchi.  Neuro: alert and oriented to time, place, and person. Normal gait  Psych: normal mood and affect     Result Review :   The following data was reviewed by: Sam Hampton Jr, MD on 02/06/2023:  Common labs    Common Labs 7/12/22 7/12/22 7/12/22 9/29/22 9/29/22    0840 0840 0840 1411 1411   Glucose  91   87   BUN  21   14   Creatinine  0.83   0.89   Sodium  139   135 (A)   Potassium  4.2   4.8   Chloride  101   99   Calcium  9.1   9.2   Albumin  4.40   4.10   Total Bilirubin  0.4   0.4   Alkaline Phosphatase  68   68   AST (SGOT)  29   33 (A)   ALT (SGPT)  18   22   WBC 6.84   9.02    Hemoglobin 14.2   12.0    Hematocrit 42.4   36.9    Platelets 246   299    Total Cholesterol   158     Triglycerides   122     HDL Cholesterol   76 (A)     LDL Cholesterol    61     (A) Abnormal value                Lab Results   Component Value Date    COVID19 NOT DETECTED 08/21/2020    INR 1.0 " 01/11/2023            Assessment and Plan    Diagnoses and all orders for this visit:    1. Primary hypertension (Primary)  -     Comprehensive Metabolic Panel  -     CBC & Differential    2. Other hyperlipidemia  -     Lipid Panel    3. Coronary artery disease of native artery of native heart with stable angina pectoris (HCC)  Comments:  cath reviewed. cont BB, statin, imdura, and DAPT.     4. Nausea  -     Scopolamine 1 MG/3DAYS patch; Place 1 patch on the skin as directed by provider Every 72 (Seventy-Two) Hours.  Dispense: 30 patch; Refill: 1  -     Lipase  -     ondansetron (ZOFRAN) 4 MG tablet; Take 1 tablet by mouth Every 8 (Eight) Hours As Needed for Nausea or Vomiting.  Dispense: 90 tablet; Refill: 1    5. Chronic obstructive pulmonary disease, unspecified COPD type (HCC)  Comments:  cont trelegy and monitor efficacy. will Rx magic mouthwash to help with recurrent sores.     6. Need for pneumococcal vaccination  -     Pneumococcal Conjugate Vaccine 20-Valent All    Other orders  -     nystatin susp + lidocaine viscous (MAGIC MOUTHWASH) oral suspension; Swish and swallow 5 mL 4 (Four) Times a Day.  Dispense: 60 mL; Refill: 1          Medications Discontinued During This Encounter   Medication Reason   • ondansetron (ZOFRAN) 4 MG tablet *Therapy completed   • nystatin (MYCOSTATIN) 100,000 unit/mL suspension *Therapy completed   • Fluticasone-Umeclidin-Vilant 200-62.5-25 MCG/ACT aerosol powder  Alternate therapy          Follow Up   Return in about 4 months (around 6/6/2023) for Recheck.  Patient was given instructions and counseling regarding her condition or for health maintenance advice. Please see specific information pulled into the AVS if appropriate.       Sam Hampton Jr, MD  02/06/23  13:10 EST

## 2023-02-13 ENCOUNTER — TREATMENT (OUTPATIENT)
Dept: CARDIAC REHAB | Facility: HOSPITAL | Age: 78
End: 2023-02-13
Payer: MEDICARE

## 2023-02-13 DIAGNOSIS — Z98.61 HISTORY OF PERCUTANEOUS CORONARY INTERVENTION: Primary | ICD-10-CM

## 2023-02-13 PROCEDURE — 93798 PHYS/QHP OP CAR RHAB W/ECG: CPT

## 2023-03-02 ENCOUNTER — APPOINTMENT (OUTPATIENT)
Dept: CARDIAC REHAB | Facility: HOSPITAL | Age: 78
End: 2023-03-02
Payer: MEDICARE

## 2023-03-07 ENCOUNTER — APPOINTMENT (OUTPATIENT)
Dept: CARDIAC REHAB | Facility: HOSPITAL | Age: 78
End: 2023-03-07
Payer: MEDICARE

## 2023-03-09 ENCOUNTER — APPOINTMENT (OUTPATIENT)
Dept: CARDIAC REHAB | Facility: HOSPITAL | Age: 78
End: 2023-03-09
Payer: MEDICARE

## 2023-03-14 ENCOUNTER — APPOINTMENT (OUTPATIENT)
Dept: CARDIAC REHAB | Facility: HOSPITAL | Age: 78
End: 2023-03-14
Payer: MEDICARE

## 2023-04-18 ENCOUNTER — OFFICE VISIT (OUTPATIENT)
Dept: PULMONOLOGY | Facility: CLINIC | Age: 78
End: 2023-04-18
Payer: MEDICARE

## 2023-04-18 VITALS
SYSTOLIC BLOOD PRESSURE: 117 MMHG | TEMPERATURE: 97.8 F | OXYGEN SATURATION: 94 % | WEIGHT: 127 LBS | RESPIRATION RATE: 18 BRPM | BODY MASS INDEX: 21.16 KG/M2 | DIASTOLIC BLOOD PRESSURE: 77 MMHG | HEIGHT: 65 IN | HEART RATE: 67 BPM

## 2023-04-18 DIAGNOSIS — J44.9 CHRONIC OBSTRUCTIVE PULMONARY DISEASE, UNSPECIFIED COPD TYPE: Primary | ICD-10-CM

## 2023-04-18 DIAGNOSIS — R91.8 LUNG NODULES: ICD-10-CM

## 2023-04-18 PROCEDURE — 1160F RVW MEDS BY RX/DR IN RCRD: CPT | Performed by: INTERNAL MEDICINE

## 2023-04-18 PROCEDURE — 3078F DIAST BP <80 MM HG: CPT | Performed by: INTERNAL MEDICINE

## 2023-04-18 PROCEDURE — 3074F SYST BP LT 130 MM HG: CPT | Performed by: INTERNAL MEDICINE

## 2023-04-18 PROCEDURE — 99213 OFFICE O/P EST LOW 20 MIN: CPT | Performed by: INTERNAL MEDICINE

## 2023-04-18 PROCEDURE — 1159F MED LIST DOCD IN RCRD: CPT | Performed by: INTERNAL MEDICINE

## 2023-04-18 RX ORDER — FLUTICASONE FUROATE, UMECLIDINIUM BROMIDE AND VILANTEROL TRIFENATATE 100; 62.5; 25 UG/1; UG/1; UG/1
1 POWDER RESPIRATORY (INHALATION)
Qty: 1 EACH | Refills: 5 | Status: SHIPPED | OUTPATIENT
Start: 2023-04-18

## 2023-04-18 RX ORDER — FLUTICASONE FUROATE, UMECLIDINIUM BROMIDE AND VILANTEROL TRIFENATATE 100; 62.5; 25 UG/1; UG/1; UG/1
1 POWDER RESPIRATORY (INHALATION)
Qty: 2 EACH | Refills: 0 | COMMUNITY
Start: 2023-04-18 | End: 2023-04-19

## 2023-04-18 NOTE — PROGRESS NOTES
Pulmonary Office Follow-up    Subjective     Lianne Bardales is seen today at the office for   Chief Complaint   Patient presents with   • COPD   • Lung Nodule   • Asthma   • Follow-up     3 Month follow-up         HPI  Lianne Bardales is a 77 y.o. female with a PMH significant for COPD and tobacco abuse presents for follow-up patient has been doing pretty well on her Trelegy she denies any significant cough or expectoration and has been fairly active      Tobacco use history:  Former smoker      Patient Active Problem List   Diagnosis   • Acid reflux   • Anemia, iron deficiency   • Asthma   • COPD (chronic obstructive pulmonary disease) (HCC)   • Gall stones   • Chronic dyspnea   • History of fungal infection   • Lung nodule   • Tobacco abuse, in remission   • Primary hypertension   • Other hyperlipidemia   • Coronary artery disease of native artery of native heart with stable angina pectoris       Review of Systems  Review of Systems   Respiratory: Positive for shortness of breath.    All other systems reviewed and are negative.    As described in the HPI. Otherwise, remainder of ROS (14 systems) were negative.    Medications, Allergies, Social, and Family Histories reviewed as per EMR.    Objective     Vitals:    04/18/23 1123   BP: 117/77   Pulse: 67   Resp: 18   Temp: 97.8 °F (36.6 °C)   SpO2: 94%         04/18/23  1123   Weight: 57.6 kg (127 lb)       Physical Exam  Vitals and nursing note reviewed.   Constitutional:       Appearance: Normal appearance.   HENT:      Head: Normocephalic and atraumatic.      Nose: Nose normal.      Mouth/Throat:      Mouth: Mucous membranes are moist.      Pharynx: Oropharynx is clear.   Eyes:      Extraocular Movements: Extraocular movements intact.      Conjunctiva/sclera: Conjunctivae normal.      Pupils: Pupils are equal, round, and reactive to light.   Cardiovascular:      Rate and Rhythm: Normal rate and regular rhythm.      Pulses: Normal pulses.      Heart  sounds: Normal heart sounds.   Pulmonary:      Effort: Pulmonary effort is normal.      Breath sounds: Normal breath sounds.   Abdominal:      General: Abdomen is flat. Bowel sounds are normal.      Palpations: Abdomen is soft.   Musculoskeletal:         General: Normal range of motion.      Cervical back: Normal range of motion and neck supple.   Skin:     General: Skin is warm.      Capillary Refill: Capillary refill takes 2 to 3 seconds.   Neurological:      General: No focal deficit present.      Mental Status: She is alert and oriented to person, place, and time.   Psychiatric:         Mood and Affect: Mood normal.         Behavior: Behavior normal.         No radiology results for the last 90 days.     Assessment & Plan     Diagnoses and all orders for this visit:    1. Chronic obstructive pulmonary disease, unspecified COPD type (Primary)    2. Lung nodules    Other orders  -     Fluticasone-Umeclidin-Vilant (Trelegy Ellipta) 100-62.5-25 MCG/ACT inhaler; Inhale 1 puff Daily.  Dispense: 1 each; Refill: 5         Discussion/ Recommendations:   We will refill her Trelegy  Advise regular exercise  Albuterol inhaler as needed  Vaccinations discussed and recommended    BMI is within normal parameters. No other follow-up for BMI required.        Return in about 3 months (around 7/18/2023).          This document has been electronically signed by Mathew Mcgovern MD on April 18, 2023 11:30 EDT

## 2023-04-28 DIAGNOSIS — E78.49 OTHER HYPERLIPIDEMIA: ICD-10-CM

## 2023-04-28 DIAGNOSIS — I10 PRIMARY HYPERTENSION: Primary | ICD-10-CM

## 2023-04-28 DIAGNOSIS — K21.9 GASTROESOPHAGEAL REFLUX DISEASE, UNSPECIFIED WHETHER ESOPHAGITIS PRESENT: ICD-10-CM

## 2023-04-28 RX ORDER — ESOMEPRAZOLE MAGNESIUM 40 MG/1
40 CAPSULE, DELAYED RELEASE ORAL
Qty: 90 CAPSULE | Refills: 3 | Status: SHIPPED | OUTPATIENT
Start: 2023-04-28

## 2023-04-28 RX ORDER — ROSUVASTATIN CALCIUM 40 MG/1
40 TABLET, COATED ORAL
Qty: 90 TABLET | Refills: 3 | Status: SHIPPED | OUTPATIENT
Start: 2023-04-28

## 2023-04-28 RX ORDER — METOPROLOL SUCCINATE 50 MG/1
50 TABLET, EXTENDED RELEASE ORAL DAILY
Qty: 90 TABLET | Refills: 3 | Status: SHIPPED | OUTPATIENT
Start: 2023-04-28

## 2023-05-01 RX ORDER — FLUTICASONE FUROATE, UMECLIDINIUM BROMIDE AND VILANTEROL TRIFENATATE 100; 62.5; 25 UG/1; UG/1; UG/1
1 POWDER RESPIRATORY (INHALATION)
Qty: 3 EACH | Refills: 2 | Status: SHIPPED | OUTPATIENT
Start: 2023-05-01 | End: 2023-07-30

## 2023-07-05 PROBLEM — R07.89 INTERMITTENT RIGHT-SIDED CHEST PAIN: Status: ACTIVE | Noted: 2023-07-05

## 2023-07-17 PROBLEM — E78.2 MIXED HYPERLIPIDEMIA: Status: ACTIVE | Noted: 2021-11-29

## 2023-07-25 ENCOUNTER — OFFICE VISIT (OUTPATIENT)
Dept: PULMONOLOGY | Facility: CLINIC | Age: 78
End: 2023-07-25
Payer: MEDICARE

## 2023-07-25 ENCOUNTER — TELEPHONE (OUTPATIENT)
Dept: INTERNAL MEDICINE | Facility: CLINIC | Age: 78
End: 2023-07-25
Payer: MEDICARE

## 2023-07-25 VITALS
OXYGEN SATURATION: 95 % | HEART RATE: 73 BPM | BODY MASS INDEX: 20.49 KG/M2 | WEIGHT: 123 LBS | HEIGHT: 65 IN | RESPIRATION RATE: 18 BRPM | TEMPERATURE: 98.7 F | DIASTOLIC BLOOD PRESSURE: 72 MMHG | SYSTOLIC BLOOD PRESSURE: 108 MMHG

## 2023-07-25 DIAGNOSIS — R91.8 LUNG NODULES: ICD-10-CM

## 2023-07-25 DIAGNOSIS — R07.89 CHEST PAIN, ATYPICAL: Primary | ICD-10-CM

## 2023-07-25 DIAGNOSIS — J44.9 CHRONIC OBSTRUCTIVE PULMONARY DISEASE, UNSPECIFIED COPD TYPE: ICD-10-CM

## 2023-07-25 PROCEDURE — 3078F DIAST BP <80 MM HG: CPT | Performed by: INTERNAL MEDICINE

## 2023-07-25 PROCEDURE — 3074F SYST BP LT 130 MM HG: CPT | Performed by: INTERNAL MEDICINE

## 2023-07-25 PROCEDURE — 1159F MED LIST DOCD IN RCRD: CPT | Performed by: INTERNAL MEDICINE

## 2023-07-25 PROCEDURE — 99214 OFFICE O/P EST MOD 30 MIN: CPT | Performed by: INTERNAL MEDICINE

## 2023-07-25 PROCEDURE — 1160F RVW MEDS BY RX/DR IN RCRD: CPT | Performed by: INTERNAL MEDICINE

## 2023-07-25 NOTE — PROGRESS NOTES
Pulmonary Office Follow-up    Subjective     Lianne Bardales is seen today at the office for   Chief Complaint   Patient presents with    Follow-up    COPD    Asthma    LUNG NODULES    CRONIC DYSPNEA         HPI  Lianne Bardales is a 78 y.o. female with a PMH significant for COPD and lung nodules presents for follow-up patient has been having intermittent right-sided chest pain merrily posteriorly at times worse on taking a deep breath for the past 2 to 3 weeks she denies any fever or hemoptysis      Tobacco use history:  Former smoker      Patient Active Problem List   Diagnosis    Acid reflux    Anemia, iron deficiency    Asthma    COPD (chronic obstructive pulmonary disease)    Gall stones    Chronic dyspnea    History of fungal infection    Lung nodule    Tobacco abuse, in remission    Primary hypertension    Mixed hyperlipidemia    Coronary artery disease of native artery of native heart with stable angina pectoris    Intermittent right-sided chest pain       Review of Systems  Review of Systems   Cardiovascular:  Positive for chest pain.   All other systems reviewed and are negative.  As described in the HPI. Otherwise, remainder of ROS (14 systems) were negative.    Medications, Allergies, Social, and Family Histories reviewed as per EMR.    Objective     Vitals:    07/25/23 1131   BP: 108/72   Pulse: 73   Resp: 18   Temp: 98.7 °F (37.1 °C)   SpO2: 95%         07/25/23  1131   Weight: 55.8 kg (123 lb)       Physical Exam  Vitals and nursing note reviewed.   Constitutional:       Appearance: Normal appearance.   HENT:      Head: Normocephalic and atraumatic.      Nose: Nose normal.      Mouth/Throat:      Mouth: Mucous membranes are moist.      Pharynx: Oropharynx is clear.   Eyes:      Extraocular Movements: Extraocular movements intact.      Conjunctiva/sclera: Conjunctivae normal.      Pupils: Pupils are equal, round, and reactive to light.   Cardiovascular:      Rate and Rhythm: Normal rate and  regular rhythm.      Pulses: Normal pulses.      Heart sounds: Normal heart sounds.   Pulmonary:      Effort: Pulmonary effort is normal.      Breath sounds: Normal breath sounds.   Abdominal:      General: Abdomen is flat. Bowel sounds are normal.      Palpations: Abdomen is soft.   Musculoskeletal:         General: Normal range of motion.      Cervical back: Normal range of motion and neck supple.   Skin:     General: Skin is warm.      Capillary Refill: Capillary refill takes 2 to 3 seconds.   Neurological:      General: No focal deficit present.      Mental Status: She is alert and oriented to person, place, and time.   Psychiatric:         Mood and Affect: Mood normal.         Behavior: Behavior normal.       XR Chest PA & Lateral    Result Date: 7/5/2023    1. No acute cardiopulmonary disease.     BLANCA PALOMARES MD       Electronically Signed and Approved By: BLANCA PALOMARES MD on 7/05/2023 at 13:54               Assessment & Plan     Diagnoses and all orders for this visit:    1. Chest pain, atypical (Primary)  -     CT Chest With Contrast; Future    2. Chronic obstructive pulmonary disease, unspecified COPD type  -     CT Chest With Contrast; Future    3. Lung nodules  -     CT Chest With Contrast; Future         Discussion/ Recommendations:   We will order CT scan of the chest for further evaluation of chest pain  Continue [facility] daily  Patient has already quit smoking  Regular exercise  Vaccinations discussed and recommended    BMI is within normal parameters. No other follow-up for BMI required.        Return in about 3 months (around 10/25/2023).          This document has been electronically signed by Mathew Mcgovern MD on July 25, 2023 11:36 EDT

## 2023-07-25 NOTE — TELEPHONE ENCOUNTER
Caller: Lianne Bardales    Relationship: Self    Best call back number: [phone]    What is the best time to reach you: ANY    Who are you requesting to speak with (clinical staff, provider,  specific staff member): CLINICAL    Do you know the name of the person who called: PATIENT    What was the call regarding: PATIENT HAS A QUESTION SHE WOULD LIKE TO DISCUSS WITH DR ENG OR HIS NURSE. PLEASE CALL PATIENT BACK AND ADVISE.    Is it okay if the provider responds through MyChart: N/A

## 2023-07-27 NOTE — TELEPHONE ENCOUNTER
Patient called back she was wondering about the b12 shot I explaiend to her the last time she was here dr. Hampton did her labs and the b12 looks great

## 2023-08-24 DIAGNOSIS — R11.0 NAUSEA: ICD-10-CM

## 2023-08-24 RX ORDER — ONDANSETRON 4 MG/1
TABLET, FILM COATED ORAL
Qty: 90 TABLET | Refills: 1 | Status: SHIPPED | OUTPATIENT
Start: 2023-08-24

## 2023-08-31 ENCOUNTER — HOSPITAL ENCOUNTER (OUTPATIENT)
Dept: CT IMAGING | Facility: HOSPITAL | Age: 78
Discharge: HOME | End: 2023-08-31
Admitting: INTERNAL MEDICINE
Payer: MEDICARE

## 2023-08-31 DIAGNOSIS — R91.8 LUNG NODULES: ICD-10-CM

## 2023-08-31 DIAGNOSIS — J44.9 CHRONIC OBSTRUCTIVE PULMONARY DISEASE, UNSPECIFIED COPD TYPE: ICD-10-CM

## 2023-08-31 DIAGNOSIS — R07.89 CHEST PAIN, ATYPICAL: ICD-10-CM

## 2023-08-31 LAB
CREAT BLDA-MCNC: 1.1 MG/DL
EGFRCR SERPLBLD CKD-EPI 2021: 51.9 ML/MIN/1.73

## 2023-08-31 PROCEDURE — 82565 ASSAY OF CREATININE: CPT

## 2023-08-31 PROCEDURE — 25510000001 IOPAMIDOL PER 1 ML: Performed by: INTERNAL MEDICINE

## 2023-08-31 PROCEDURE — 71260 CT THORAX DX C+: CPT

## 2023-08-31 RX ADMIN — IOPAMIDOL 100 ML: 755 INJECTION, SOLUTION INTRAVENOUS at 11:00

## 2023-09-08 DIAGNOSIS — J44.9 CHRONIC OBSTRUCTIVE PULMONARY DISEASE, UNSPECIFIED COPD TYPE: ICD-10-CM

## 2023-09-08 DIAGNOSIS — J45.909 ASTHMA, UNSPECIFIED ASTHMA SEVERITY, UNSPECIFIED WHETHER COMPLICATED, UNSPECIFIED WHETHER PERSISTENT: Primary | ICD-10-CM

## 2023-09-11 RX ORDER — ALBUTEROL SULFATE 90 UG/1
2 AEROSOL, METERED RESPIRATORY (INHALATION) EVERY 4 HOURS PRN
Qty: 18 G | Refills: 3 | Status: SHIPPED | OUTPATIENT
Start: 2023-09-11

## 2023-10-02 RX ORDER — FLUTICASONE FUROATE, UMECLIDINIUM BROMIDE AND VILANTEROL TRIFENATATE 100; 62.5; 25 UG/1; UG/1; UG/1
POWDER RESPIRATORY (INHALATION)
Qty: 1 EACH | Refills: 5 | Status: SHIPPED | OUTPATIENT
Start: 2023-10-02

## 2023-10-25 ENCOUNTER — OFFICE VISIT (OUTPATIENT)
Dept: PULMONOLOGY | Facility: CLINIC | Age: 78
End: 2023-10-25
Payer: MEDICARE

## 2023-10-25 VITALS
HEIGHT: 65 IN | RESPIRATION RATE: 16 BRPM | DIASTOLIC BLOOD PRESSURE: 79 MMHG | BODY MASS INDEX: 21.59 KG/M2 | TEMPERATURE: 97.7 F | WEIGHT: 129.6 LBS | OXYGEN SATURATION: 95 % | SYSTOLIC BLOOD PRESSURE: 125 MMHG | HEART RATE: 66 BPM

## 2023-10-25 DIAGNOSIS — F17.201 TOBACCO ABUSE, IN REMISSION: ICD-10-CM

## 2023-10-25 DIAGNOSIS — J44.9 CHRONIC OBSTRUCTIVE PULMONARY DISEASE, UNSPECIFIED COPD TYPE: Primary | ICD-10-CM

## 2023-10-25 DIAGNOSIS — J45.909 ASTHMA, UNSPECIFIED ASTHMA SEVERITY, UNSPECIFIED WHETHER COMPLICATED, UNSPECIFIED WHETHER PERSISTENT: ICD-10-CM

## 2023-10-25 PROCEDURE — 3074F SYST BP LT 130 MM HG: CPT | Performed by: INTERNAL MEDICINE

## 2023-10-25 PROCEDURE — 1159F MED LIST DOCD IN RCRD: CPT | Performed by: INTERNAL MEDICINE

## 2023-10-25 PROCEDURE — 99214 OFFICE O/P EST MOD 30 MIN: CPT | Performed by: INTERNAL MEDICINE

## 2023-10-25 PROCEDURE — 3078F DIAST BP <80 MM HG: CPT | Performed by: INTERNAL MEDICINE

## 2023-10-25 PROCEDURE — 1160F RVW MEDS BY RX/DR IN RCRD: CPT | Performed by: INTERNAL MEDICINE

## 2023-10-25 NOTE — PROGRESS NOTES
Pulmonary Office Follow-up    Subjective     Lianne Bardales is seen today at the office for   Chief Complaint   Patient presents with    Follow-up    Asthma    COPD    Shortness of Breath         HPI  Lianne Bardales is a 78 y.o. female with a PMH significant for COPD and bronchial asthma presents for follow-up patient has been using her Trelegy daily and has been doing well she denies any significant cough expectoration chest pain fever or hemoptysis she denies any leg pain or swelling and has been stable      Tobacco use history:  Former smoker      Patient Active Problem List   Diagnosis    Acid reflux    Anemia, iron deficiency    Asthma    COPD (chronic obstructive pulmonary disease)    Gall stones    Chronic dyspnea    History of fungal infection    Lung nodule    Tobacco abuse, in remission    Primary hypertension    Mixed hyperlipidemia    Coronary artery disease of native artery of native heart with stable angina pectoris    Intermittent right-sided chest pain       Review of Systems  Review of Systems   All other systems reviewed and are negative.    As described in the HPI. Otherwise, remainder of ROS (14 systems) were negative.    Medications, Allergies, Social, and Family Histories reviewed as per EMR.    Objective     Vitals:    10/25/23 0940   BP: 125/79   Pulse: 66   Resp: 16   Temp: 97.7 °F (36.5 °C)   SpO2: 95%         10/25/23  0940   Weight: 58.8 kg (129 lb 9.6 oz)       Physical Exam  Vitals and nursing note reviewed.   Constitutional:       Appearance: Normal appearance.   HENT:      Head: Normocephalic and atraumatic.      Nose: Nose normal.      Mouth/Throat:      Mouth: Mucous membranes are moist.      Pharynx: Oropharynx is clear.   Eyes:      Extraocular Movements: Extraocular movements intact.      Conjunctiva/sclera: Conjunctivae normal.      Pupils: Pupils are equal, round, and reactive to light.   Cardiovascular:      Rate and Rhythm: Normal rate and regular rhythm.       Pulses: Normal pulses.      Heart sounds: Normal heart sounds.   Pulmonary:      Effort: Pulmonary effort is normal.      Breath sounds: Normal breath sounds.   Abdominal:      General: Abdomen is flat. Bowel sounds are normal.      Palpations: Abdomen is soft.   Musculoskeletal:         General: Normal range of motion.      Cervical back: Normal range of motion and neck supple.   Skin:     General: Skin is warm.      Capillary Refill: Capillary refill takes 2 to 3 seconds.   Neurological:      General: No focal deficit present.      Mental Status: She is alert and oriented to person, place, and time.   Psychiatric:         Mood and Affect: Mood normal.         Behavior: Behavior normal.         CT Chest With Contrast Diagnostic    Result Date: 8/31/2023    1. Postoperative changes within the right upper lobe with some a associated minimal parenchymal scarring unchanged from prior exam. 2. No acute cardiopulmonary disease 3. 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 Chronic obstructive pulmonary disease, unspecified COPD type (Primary)    2. Asthma, unspecified asthma severity, unspecified whether complicated, unspecified whether persistent    3. Tobacco abuse, in remission         Discussion/ Recommendations:   Patient is advised to continue her Trelegy  Albuterol inhaler as needed  Patient is good on her shots and vaccinations  Patient is advised regular exercise  CT scan reviewed no lung masses or infiltrates were identified patient has some postsurgical scarring  Vaccinations discussed and recommended    BMI is within normal parameters. No other follow-up for BMI required.        Return in about 4 months (around 2/25/2024).          This document has been electronically signed by Mathew Mcgovern MD on October 25,  2023 09:53 EDT

## 2024-01-18 ENCOUNTER — OFFICE VISIT (OUTPATIENT)
Dept: INTERNAL MEDICINE | Facility: CLINIC | Age: 79
End: 2024-01-18
Payer: MEDICARE

## 2024-01-18 VITALS
OXYGEN SATURATION: 93 % | SYSTOLIC BLOOD PRESSURE: 131 MMHG | BODY MASS INDEX: 22.26 KG/M2 | HEIGHT: 65 IN | WEIGHT: 133.6 LBS | HEART RATE: 61 BPM | TEMPERATURE: 98 F | DIASTOLIC BLOOD PRESSURE: 85 MMHG

## 2024-01-18 DIAGNOSIS — J44.9 CHRONIC OBSTRUCTIVE PULMONARY DISEASE, UNSPECIFIED COPD TYPE: ICD-10-CM

## 2024-01-18 DIAGNOSIS — E55.9 VITAMIN D DEFICIENCY: ICD-10-CM

## 2024-01-18 DIAGNOSIS — I25.118 CORONARY ARTERY DISEASE OF NATIVE ARTERY OF NATIVE HEART WITH STABLE ANGINA PECTORIS: ICD-10-CM

## 2024-01-18 DIAGNOSIS — Z29.11 NEED FOR RSV VACCINATION: ICD-10-CM

## 2024-01-18 DIAGNOSIS — E78.2 MIXED HYPERLIPIDEMIA: ICD-10-CM

## 2024-01-18 DIAGNOSIS — I10 PRIMARY HYPERTENSION: Primary | ICD-10-CM

## 2024-01-18 LAB
25(OH)D3 SERPL-MCNC: 47.2 NG/ML (ref 30–100)
ALBUMIN SERPL-MCNC: 4.2 G/DL (ref 3.5–5.2)
ALBUMIN/GLOB SERPL: 1.4 G/DL
ALP SERPL-CCNC: 76 U/L (ref 39–117)
ALT SERPL W P-5'-P-CCNC: 22 U/L (ref 1–33)
ANION GAP SERPL CALCULATED.3IONS-SCNC: 11.6 MMOL/L (ref 5–15)
AST SERPL-CCNC: 34 U/L (ref 1–32)
BASOPHILS # BLD AUTO: 0.06 10*3/MM3 (ref 0–0.2)
BASOPHILS NFR BLD AUTO: 0.7 % (ref 0–1.5)
BILIRUB SERPL-MCNC: 0.6 MG/DL (ref 0–1.2)
BUN SERPL-MCNC: 24 MG/DL (ref 8–23)
BUN/CREAT SERPL: 23.3 (ref 7–25)
CALCIUM SPEC-SCNC: 9.3 MG/DL (ref 8.6–10.5)
CHLORIDE SERPL-SCNC: 99 MMOL/L (ref 98–107)
CHOLEST SERPL-MCNC: 176 MG/DL (ref 0–200)
CO2 SERPL-SCNC: 25.4 MMOL/L (ref 22–29)
CREAT SERPL-MCNC: 1.03 MG/DL (ref 0.57–1)
DEPRECATED RDW RBC AUTO: 44.2 FL (ref 37–54)
EGFRCR SERPLBLD CKD-EPI 2021: 56.1 ML/MIN/1.73
EOSINOPHIL # BLD AUTO: 0.2 10*3/MM3 (ref 0–0.4)
EOSINOPHIL NFR BLD AUTO: 2.5 % (ref 0.3–6.2)
ERYTHROCYTE [DISTWIDTH] IN BLOOD BY AUTOMATED COUNT: 12.5 % (ref 12.3–15.4)
GLOBULIN UR ELPH-MCNC: 2.9 GM/DL
GLUCOSE SERPL-MCNC: 93 MG/DL (ref 65–99)
HCT VFR BLD AUTO: 41.9 % (ref 34–46.6)
HDLC SERPL-MCNC: 69 MG/DL (ref 40–60)
HGB BLD-MCNC: 14.3 G/DL (ref 12–15.9)
IMM GRANULOCYTES # BLD AUTO: 0.03 10*3/MM3 (ref 0–0.05)
IMM GRANULOCYTES NFR BLD AUTO: 0.4 % (ref 0–0.5)
LDLC SERPL CALC-MCNC: 80 MG/DL (ref 0–100)
LDLC/HDLC SERPL: 1.08 {RATIO}
LYMPHOCYTES # BLD AUTO: 1.14 10*3/MM3 (ref 0.7–3.1)
LYMPHOCYTES NFR BLD AUTO: 14 % (ref 19.6–45.3)
MCH RBC QN AUTO: 33.2 PG (ref 26.6–33)
MCHC RBC AUTO-ENTMCNC: 34.1 G/DL (ref 31.5–35.7)
MCV RBC AUTO: 97.2 FL (ref 79–97)
MONOCYTES # BLD AUTO: 1.05 10*3/MM3 (ref 0.1–0.9)
MONOCYTES NFR BLD AUTO: 12.9 % (ref 5–12)
NEUTROPHILS NFR BLD AUTO: 5.65 10*3/MM3 (ref 1.7–7)
NEUTROPHILS NFR BLD AUTO: 69.5 % (ref 42.7–76)
NRBC BLD AUTO-RTO: 0 /100 WBC (ref 0–0.2)
PLATELET # BLD AUTO: 240 10*3/MM3 (ref 140–450)
PMV BLD AUTO: 9.7 FL (ref 6–12)
POTASSIUM SERPL-SCNC: 4.4 MMOL/L (ref 3.5–5.2)
PROT SERPL-MCNC: 7.1 G/DL (ref 6–8.5)
RBC # BLD AUTO: 4.31 10*6/MM3 (ref 3.77–5.28)
SODIUM SERPL-SCNC: 136 MMOL/L (ref 136–145)
TRIGL SERPL-MCNC: 163 MG/DL (ref 0–150)
VLDLC SERPL-MCNC: 27 MG/DL (ref 5–40)
WBC NRBC COR # BLD AUTO: 8.13 10*3/MM3 (ref 3.4–10.8)

## 2024-01-18 PROCEDURE — 82306 VITAMIN D 25 HYDROXY: CPT | Performed by: INTERNAL MEDICINE

## 2024-01-18 PROCEDURE — 99214 OFFICE O/P EST MOD 30 MIN: CPT | Performed by: INTERNAL MEDICINE

## 2024-01-18 PROCEDURE — 3075F SYST BP GE 130 - 139MM HG: CPT | Performed by: INTERNAL MEDICINE

## 2024-01-18 PROCEDURE — 3079F DIAST BP 80-89 MM HG: CPT | Performed by: INTERNAL MEDICINE

## 2024-01-18 PROCEDURE — 85025 COMPLETE CBC W/AUTO DIFF WBC: CPT | Performed by: INTERNAL MEDICINE

## 2024-01-18 PROCEDURE — 80061 LIPID PANEL: CPT | Performed by: INTERNAL MEDICINE

## 2024-01-18 PROCEDURE — 80053 COMPREHEN METABOLIC PANEL: CPT | Performed by: INTERNAL MEDICINE

## 2024-01-18 RX ORDER — MAGNESIUM 30 MG
30 TABLET ORAL 2 TIMES DAILY
COMMUNITY

## 2024-01-18 NOTE — PROGRESS NOTES
"Chief Complaint  Hypertension (Follow up )    Subjective        Lianne Bardales presents to [facility]  History of Present Illness  hypertension- patient denies headaches, dizziness, chest pain.   coronary artery disease- patient reports stopping her AC on her own volition. She follow-up with cardiology in spring. Patient reports cardiology stopped her imdur.   COPD- patient's breathing at baseline. She is now on trelegy to help.   Patient has started taking magnesium with 400mg.     Current Outpatient Medications   Medication Instructions    albuterol sulfate  (90 Base) MCG/ACT inhaler 2 puffs, Inhalation, Every 4 Hours PRN    Aspirin Low Dose 81 mg, Oral, Daily    Cetirizine HCl 10 MG capsule Oral    clopidogrel (PLAVIX) 75 mg, Oral, Daily    esomeprazole (NEXIUM) 40 mg, Oral, 2 Times Daily    magnesium 30 mg, Oral, 2 Times Daily    metoprolol succinate XL (TOPROL-XL) 50 mg, Oral, Daily    ondansetron (ZOFRAN) 4 MG tablet TAKE 1 TABLET BY MOUTH EVERY 8  HOURS AS NEEDED NAUSEA AND  VOMITING    rosuvastatin (CRESTOR) 40 mg, Oral, Every Night at Bedtime    spironolactone-hydrochlorothiazide (ALDACTAZIDE) 25-25 MG tablet 1 tablet, Oral, Daily    Trelegy Ellipta 100-62.5-25 MCG/ACT inhaler USE 1 INHALATION BY MOUTH ONCE  DAILY AT THE SAME TIME EACH DAY    vitamin D (ERGOCALCIFEROL) 50,000 Units, Oral, Weekly       The following portions of the patient's history were reviewed and updated as appropriate: allergies, current medications, past family history, past medical history, past social history, past surgical history, and problem list.    Objective   Vital Signs:   /85 (BP Location: Right arm)   Pulse 61   Temp 98 °F (36.7 °C) (Temporal)   Ht 163.8 cm (64.5\")   Wt 60.6 kg (133 lb 9.6 oz)   SpO2 93%   BMI 22.58 kg/m²     Wt Readings from Last 3 Encounters:   01/18/24 60.6 kg (133 lb 9.6 oz)   10/25/23 58.8 kg (129 lb 9.6 oz)   07/25/23 55.8 kg (123 " lb)     BP Readings from Last 3 Encounters:   01/18/24 131/85   10/25/23 125/79   07/25/23 108/72     Physical Exam   Appearance: No acute distress, well-nourished  Head: normocephalic, atraumatic  Eyes: extraocular movements intact, no scleral icterus, no conjunctival injection  Ears, Nose, and Throat: external ears normal, nares patent, moist mucous membranes  Cardiovascular: regular rate and rhythm. no murmurs, rubs, or gallops. no edema  Respiratory: breathing comfortably, symmetric chest rise, clear to auscultation bilaterally. No wheezes, rales, or rhonchi.  Neuro: alert and oriented to time, place, and person. Normal gait  Psych: normal mood and affect     Result Review :   The following data was reviewed by: Sam Hampton Jr, MD on 02/06/2023:  Common labs          2/6/2023    12:29 7/17/2023    12:14 8/31/2023    10:59   Common Labs   Glucose 99  73     BUN 21  24     Creatinine 0.93  1.13  1.10    Sodium 135  138     Potassium 4.1  3.6     Chloride 99  98     Calcium 9.6  9.7     Albumin 4.5  4.4     Total Bilirubin 0.4  0.4     Alkaline Phosphatase 50  76     AST (SGOT) 38  37     ALT (SGPT) 30  18     WBC 6.57  8.50     Hemoglobin 13.6  13.7     Hematocrit 40.1  41.6     Platelets 211  311     Total Cholesterol 145  148     Triglycerides 71  126     HDL Cholesterol 80  58     LDL Cholesterol  51  68         Lab Results   Component Value Date    COVID19 NOT DETECTED 08/21/2020    INR 1.0 01/11/2023    BILIRUBINUR Negative 07/17/2023          Assessment and Plan    Diagnoses and all orders for this visit:    1. Primary hypertension (Primary)  Comments:  good control off imdur. monitor  Orders:  -     Comprehensive Metabolic Panel  -     CBC & Differential    2. Mixed hyperlipidemia  -     Lipid Panel  -     Coenzyme Q10, Total    3. Chronic obstructive pulmonary disease, unspecified COPD type  Comments:  at baseline. cont trelegy.    4. Vitamin D deficiency  -     Vitamin D,25-Hydroxy    5. Need  for RSV vaccination    6. Coronary artery disease of native artery of native heart with stable angina pectoris  Comments:  encouraged restart DAPT. cont BB and statin as well. f/u with cards.        Medications Discontinued During This Encounter   Medication Reason    acetaminophen (TYLENOL) 325 MG tablet *Therapy completed    isosorbide mononitrate (IMDUR) 30 MG 24 hr tablet *Therapy completed        Follow Up   Return in about 6 months (around 7/18/2024) for Recheck, HTN.  Patient was given instructions and counseling regarding her condition or for health maintenance advice. Please see specific information pulled into the AVS if appropriate.       Sam Hampton Jr, MD  01/18/24  13:13 EST

## 2024-02-02 ENCOUNTER — TELEPHONE (OUTPATIENT)
Dept: INTERNAL MEDICINE | Facility: CLINIC | Age: 79
End: 2024-02-02
Payer: MEDICARE

## 2024-02-07 DIAGNOSIS — I10 PRIMARY HYPERTENSION: ICD-10-CM

## 2024-02-07 DIAGNOSIS — E78.49 OTHER HYPERLIPIDEMIA: ICD-10-CM

## 2024-02-07 RX ORDER — ROSUVASTATIN CALCIUM 40 MG/1
40 TABLET, COATED ORAL
Qty: 90 TABLET | Refills: 3 | Status: SHIPPED | OUTPATIENT
Start: 2024-02-07

## 2024-02-07 RX ORDER — METOPROLOL SUCCINATE 50 MG/1
50 TABLET, EXTENDED RELEASE ORAL DAILY
Qty: 90 TABLET | Refills: 3 | Status: SHIPPED | OUTPATIENT
Start: 2024-02-07

## 2024-02-22 ENCOUNTER — OFFICE VISIT (OUTPATIENT)
Dept: PULMONOLOGY | Facility: CLINIC | Age: 79
End: 2024-02-22
Payer: MEDICARE

## 2024-02-22 VITALS
RESPIRATION RATE: 16 BRPM | WEIGHT: 139 LBS | HEIGHT: 65 IN | DIASTOLIC BLOOD PRESSURE: 73 MMHG | HEART RATE: 73 BPM | TEMPERATURE: 97.8 F | SYSTOLIC BLOOD PRESSURE: 134 MMHG | OXYGEN SATURATION: 92 % | BODY MASS INDEX: 23.16 KG/M2

## 2024-02-22 DIAGNOSIS — F17.201 TOBACCO ABUSE, IN REMISSION: ICD-10-CM

## 2024-02-22 DIAGNOSIS — J45.909 ASTHMA, UNSPECIFIED ASTHMA SEVERITY, UNSPECIFIED WHETHER COMPLICATED, UNSPECIFIED WHETHER PERSISTENT: ICD-10-CM

## 2024-02-22 DIAGNOSIS — J44.9 CHRONIC OBSTRUCTIVE PULMONARY DISEASE, UNSPECIFIED COPD TYPE: Primary | ICD-10-CM

## 2024-02-22 PROCEDURE — 1160F RVW MEDS BY RX/DR IN RCRD: CPT | Performed by: INTERNAL MEDICINE

## 2024-02-22 PROCEDURE — 3075F SYST BP GE 130 - 139MM HG: CPT | Performed by: INTERNAL MEDICINE

## 2024-02-22 PROCEDURE — 99213 OFFICE O/P EST LOW 20 MIN: CPT | Performed by: INTERNAL MEDICINE

## 2024-02-22 PROCEDURE — 3078F DIAST BP <80 MM HG: CPT | Performed by: INTERNAL MEDICINE

## 2024-02-22 PROCEDURE — 1159F MED LIST DOCD IN RCRD: CPT | Performed by: INTERNAL MEDICINE

## 2024-02-22 RX ORDER — VALACYCLOVIR HYDROCHLORIDE 500 MG/1
500 TABLET, FILM COATED ORAL
COMMUNITY
Start: 2024-02-14

## 2024-02-22 NOTE — PROGRESS NOTES
Pulmonary Office Follow-up    Subjective     Lianne Bardales is seen today at the office for   Chief Complaint   Patient presents with    COPD    Asthma    Follow-up    Shortness of Breath    Cough         HPI  Lianne Bardales is a 78 y.o. female with a PMH significant for COPD with asthma presents for follow-up patient has been doing well and complains of mild cough which is mostly dry she denies any wheezing or shortness of breath and seems to be doing pretty well on her present medications patient denies any chest pain fever or hemoptysis she has already quit smoking      Tobacco use history:  Former smoker      Patient Active Problem List   Diagnosis    Acid reflux    Anemia, iron deficiency    Asthma    COPD (chronic obstructive pulmonary disease)    Gall stones    Chronic dyspnea    History of fungal infection    Lung nodule    Tobacco abuse, in remission    Primary hypertension    Mixed hyperlipidemia    Coronary artery disease of native artery of native heart with stable angina pectoris    Intermittent right-sided chest pain       Review of Systems  Review of Systems   Respiratory:  Positive for cough and shortness of breath.    All other systems reviewed and are negative.    As described in the HPI. Otherwise, remainder of ROS (14 systems) were negative.    Medications, Allergies, Social, and Family Histories reviewed as per EMR.    Result Review :            Objective     Vitals:    02/22/24 0950   BP: 134/73   Pulse: 73   Resp: 16   Temp: 97.8 °F (36.6 °C)   SpO2: 92%         02/22/24  0950   Weight: 63 kg (139 lb)       Physical Exam  Vitals and nursing note reviewed.   Constitutional:       Appearance: Normal appearance.   HENT:      Head: Normocephalic and atraumatic.      Nose: Nose normal.      Mouth/Throat:      Mouth: Mucous membranes are moist.      Pharynx: Oropharynx is clear.   Eyes:      Extraocular Movements: Extraocular movements intact.      Conjunctiva/sclera: Conjunctivae  normal.      Pupils: Pupils are equal, round, and reactive to light.   Cardiovascular:      Rate and Rhythm: Normal rate and regular rhythm.      Pulses: Normal pulses.      Heart sounds: Normal heart sounds.   Pulmonary:      Effort: Pulmonary effort is normal.      Breath sounds: Normal breath sounds.   Abdominal:      General: Abdomen is flat. Bowel sounds are normal.      Palpations: Abdomen is soft.   Musculoskeletal:         General: Normal range of motion.      Cervical back: Normal range of motion and neck supple.   Skin:     General: Skin is warm.      Capillary Refill: Capillary refill takes 2 to 3 seconds.   Neurological:      General: No focal deficit present.      Mental Status: She is alert and oriented to person, place, and time.   Psychiatric:         Mood and Affect: Mood normal.         Behavior: Behavior normal.         No radiology results for the last 90 days.     Assessment & Plan     Diagnoses and all orders for this visit:    1. Chronic obstructive pulmonary disease, unspecified COPD type (Primary)    2. Asthma, unspecified asthma severity, unspecified whether complicated, unspecified whether persistent    3. Tobacco abuse, in remission         Discussion/ Recommendations:   Advise regular exercise  Advised to continue her present medications  Advised Acapella device  Vaccinations discussed and recommended    BMI is within normal parameters. No other follow-up for BMI required.        Return in about 4 months (around 6/22/2024).          This document has been electronically signed by Mathew Mcgovern MD on February 22, 2024 09:59 EST

## 2024-02-23 DIAGNOSIS — I10 ESSENTIAL (PRIMARY) HYPERTENSION: ICD-10-CM

## 2024-02-23 RX ORDER — SPIRONOLACTONE AND HYDROCHLOROTHIAZIDE 25; 25 MG/1; MG/1
1 TABLET ORAL DAILY
Qty: 90 TABLET | Refills: 1 | Status: SHIPPED | OUTPATIENT
Start: 2024-02-23 | End: 2024-02-26 | Stop reason: SDUPTHER

## 2024-02-26 DIAGNOSIS — I10 ESSENTIAL (PRIMARY) HYPERTENSION: ICD-10-CM

## 2024-02-26 RX ORDER — SPIRONOLACTONE AND HYDROCHLOROTHIAZIDE 25; 25 MG/1; MG/1
1 TABLET ORAL DAILY
Qty: 90 TABLET | Refills: 1 | Status: SHIPPED | OUTPATIENT
Start: 2024-02-26

## 2024-02-26 NOTE — TELEPHONE ENCOUNTER
Caller: Lianne Bardales    Relationship: Self    Best call back number: [phone]    Requested Prescriptions:   Requested Prescriptions     Pending Prescriptions Disp Refills    spironolactone-hydrochlorothiazide (ALDACTAZIDE) 25-25 MG tablet 90 tablet 1     Sig: Take 1 tablet by mouth Daily.        Pharmacy where request should be sent: [address] [phone] [facility] [phone]      Last office visit with prescribing clinician: 1/18/2024   Last telemedicine visit with prescribing clinician: Visit date not found   Next office visit with prescribing clinician: 7/18/2024     Additional details provided by patient:[facility] WAS SENT TO MAIL ORDER 02/23/2024 PATIENT IS OUT OF MEDICATION AND NEEDS A SHORT PRESCRIPTION TO LAST UNTIL THE MAIL ORDER ARRIVES     Does the patient have less than a 3 day supply:  [x] Yes  [] No    Would you like a call back once the refill request has been completed: [] Yes [x] No    If the office needs to give you a call back, can they leave a voicemail: [] Yes [x] No    Paul Saldana Rep   02/26/24 09:55 EST

## 2024-03-08 DIAGNOSIS — K21.9 GASTROESOPHAGEAL REFLUX DISEASE, UNSPECIFIED WHETHER ESOPHAGITIS PRESENT: ICD-10-CM

## 2024-03-08 RX ORDER — ESOMEPRAZOLE MAGNESIUM 40 MG/1
40 CAPSULE, DELAYED RELEASE ORAL
Qty: 90 CAPSULE | Refills: 3 | Status: SHIPPED | OUTPATIENT
Start: 2024-03-08

## 2024-04-18 DIAGNOSIS — J45.909 ASTHMA, UNSPECIFIED ASTHMA SEVERITY, UNSPECIFIED WHETHER COMPLICATED, UNSPECIFIED WHETHER PERSISTENT: ICD-10-CM

## 2024-04-18 DIAGNOSIS — J44.9 CHRONIC OBSTRUCTIVE PULMONARY DISEASE, UNSPECIFIED COPD TYPE: ICD-10-CM

## 2024-04-18 RX ORDER — ALBUTEROL SULFATE 90 UG/1
AEROSOL, METERED RESPIRATORY (INHALATION)
Qty: 18 G | Refills: 11 | Status: SHIPPED | OUTPATIENT
Start: 2024-04-18

## 2024-05-20 ENCOUNTER — OFFICE VISIT (OUTPATIENT)
Dept: INTERNAL MEDICINE | Facility: CLINIC | Age: 79
End: 2024-05-20
Payer: MEDICARE

## 2024-05-20 ENCOUNTER — DOCUMENTATION (OUTPATIENT)
Dept: INTERNAL MEDICINE | Facility: CLINIC | Age: 79
End: 2024-05-20
Payer: MEDICARE

## 2024-05-20 VITALS
OXYGEN SATURATION: 94 % | TEMPERATURE: 96.5 F | WEIGHT: 125.4 LBS | BODY MASS INDEX: 20.89 KG/M2 | HEIGHT: 65 IN | HEART RATE: 96 BPM | SYSTOLIC BLOOD PRESSURE: 109 MMHG | DIASTOLIC BLOOD PRESSURE: 74 MMHG
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
## 2024-05-20 DIAGNOSIS — J44.9 CHRONIC OBSTRUCTIVE PULMONARY DISEASE, UNSPECIFIED COPD TYPE: ICD-10-CM

## 2024-05-20 DIAGNOSIS — E78.2 MIXED HYPERLIPIDEMIA: ICD-10-CM

## 2024-05-20 DIAGNOSIS — M25.50 POLYARTHRALGIA: ICD-10-CM

## 2024-05-20 DIAGNOSIS — S80.862A TICK BITE OF LEFT LOWER LEG, INITIAL ENCOUNTER: ICD-10-CM

## 2024-05-20 DIAGNOSIS — I25.118 CORONARY ARTERY DISEASE OF NATIVE ARTERY OF NATIVE HEART WITH STABLE ANGINA PECTORIS: ICD-10-CM

## 2024-05-20 DIAGNOSIS — I10 PRIMARY HYPERTENSION: Primary | ICD-10-CM

## 2024-05-20 PROBLEM — I50.32 CHRONIC DIASTOLIC CONGESTIVE HEART FAILURE: Status: ACTIVE | Noted: 2024-04-17

## 2024-05-20 LAB
ALBUMIN SERPL-MCNC: 4.5 G/DL (ref 3.5–5.2)
ALBUMIN/GLOB SERPL: 1.5 G/DL
ALP SERPL-CCNC: 95 U/L (ref 39–117)
ALT SERPL W P-5'-P-CCNC: 30 U/L (ref 1–33)
ANION GAP SERPL CALCULATED.3IONS-SCNC: 14 MMOL/L (ref 5–15)
AST SERPL-CCNC: 44 U/L (ref 1–32)
BASOPHILS # BLD AUTO: 0.05 10*3/MM3 (ref 0–0.2)
BASOPHILS NFR BLD AUTO: 0.5 % (ref 0–1.5)
BILIRUB SERPL-MCNC: 0.4 MG/DL (ref 0–1.2)
BUN SERPL-MCNC: 26 MG/DL (ref 8–23)
BUN/CREAT SERPL: 19.3 (ref 7–25)
CALCIUM SPEC-SCNC: 9.5 MG/DL (ref 8.6–10.5)
CHLORIDE SERPL-SCNC: 90 MMOL/L (ref 98–107)
CHOLEST SERPL-MCNC: 156 MG/DL (ref 0–200)
CO2 SERPL-SCNC: 26 MMOL/L (ref 22–29)
CREAT SERPL-MCNC: 1.35 MG/DL (ref 0.57–1)
CRP SERPL-MCNC: 7.6 MG/DL (ref 0–0.5)
DEPRECATED RDW RBC AUTO: 46.9 FL (ref 37–54)
EGFRCR SERPLBLD CKD-EPI 2021: 40.3 ML/MIN/1.73
EOSINOPHIL # BLD AUTO: 0.08 10*3/MM3 (ref 0–0.4)
EOSINOPHIL NFR BLD AUTO: 0.8 % (ref 0.3–6.2)
ERYTHROCYTE [DISTWIDTH] IN BLOOD BY AUTOMATED COUNT: 13.3 % (ref 12.3–15.4)
ERYTHROCYTE [SEDIMENTATION RATE] IN BLOOD: 52 MM/HR (ref 0–30)
GLOBULIN UR ELPH-MCNC: 3.1 GM/DL
GLUCOSE SERPL-MCNC: 88 MG/DL (ref 65–99)
HCT VFR BLD AUTO: 45.8 % (ref 34–46.6)
HDLC SERPL-MCNC: 93 MG/DL (ref 40–60)
HGB BLD-MCNC: 15.6 G/DL (ref 12–15.9)
IMM GRANULOCYTES # BLD AUTO: 0.03 10*3/MM3 (ref 0–0.05)
IMM GRANULOCYTES NFR BLD AUTO: 0.3 % (ref 0–0.5)
LDLC SERPL CALC-MCNC: 51 MG/DL (ref 0–100)
LDLC/HDLC SERPL: 0.55 {RATIO}
LYMPHOCYTES # BLD AUTO: 0.77 10*3/MM3 (ref 0.7–3.1)
LYMPHOCYTES NFR BLD AUTO: 7.4 % (ref 19.6–45.3)
MCH RBC QN AUTO: 32.7 PG (ref 26.6–33)
MCHC RBC AUTO-ENTMCNC: 34.1 G/DL (ref 31.5–35.7)
MCV RBC AUTO: 96 FL (ref 79–97)
MONOCYTES # BLD AUTO: 1.3 10*3/MM3 (ref 0.1–0.9)
MONOCYTES NFR BLD AUTO: 12.5 % (ref 5–12)
NEUTROPHILS NFR BLD AUTO: 78.5 % (ref 42.7–76)
NEUTROPHILS NFR BLD AUTO: 8.14 10*3/MM3 (ref 1.7–7)
NRBC BLD AUTO-RTO: 0 /100 WBC (ref 0–0.2)
PLATELET # BLD AUTO: 265 10*3/MM3 (ref 140–450)
PMV BLD AUTO: 9.7 FL (ref 6–12)
POTASSIUM SERPL-SCNC: 2.5 MMOL/L (ref 3.5–5.2)
PROT SERPL-MCNC: 7.6 G/DL (ref 6–8.5)
RBC # BLD AUTO: 4.77 10*6/MM3 (ref 3.77–5.28)
SODIUM SERPL-SCNC: 130 MMOL/L (ref 136–145)
TRIGL SERPL-MCNC: 57 MG/DL (ref 0–150)
URATE SERPL-MCNC: 2.9 MG/DL (ref 2.4–5.7)
VLDLC SERPL-MCNC: 12 MG/DL (ref 5–40)
WBC NRBC COR # BLD AUTO: 10.37 10*3/MM3 (ref 3.4–10.8)

## 2024-05-20 PROCEDURE — 86140 C-REACTIVE PROTEIN: CPT | Performed by: INTERNAL MEDICINE

## 2024-05-20 PROCEDURE — 99214 OFFICE O/P EST MOD 30 MIN: CPT | Performed by: INTERNAL MEDICINE

## 2024-05-20 PROCEDURE — 85025 COMPLETE CBC W/AUTO DIFF WBC: CPT | Performed by: INTERNAL MEDICINE

## 2024-05-20 PROCEDURE — 87484 EHRLICHA CHAFFEENSIS AMP PRB: CPT | Performed by: INTERNAL MEDICINE

## 2024-05-20 PROCEDURE — 87798 DETECT AGENT NOS DNA AMP: CPT | Performed by: INTERNAL MEDICINE

## 2024-05-20 PROCEDURE — 3078F DIAST BP <80 MM HG: CPT | Performed by: INTERNAL MEDICINE

## 2024-05-20 PROCEDURE — 87468 ANAPLSMA PHGCYTOPHLM AMP PRB: CPT | Performed by: INTERNAL MEDICINE

## 2024-05-20 PROCEDURE — 86038 ANTINUCLEAR ANTIBODIES: CPT | Performed by: INTERNAL MEDICINE

## 2024-05-20 PROCEDURE — 1126F AMNT PAIN NOTED NONE PRSNT: CPT | Performed by: INTERNAL MEDICINE

## 2024-05-20 PROCEDURE — 86618 LYME DISEASE ANTIBODY: CPT | Performed by: INTERNAL MEDICINE

## 2024-05-20 PROCEDURE — 80053 COMPREHEN METABOLIC PANEL: CPT | Performed by: INTERNAL MEDICINE

## 2024-05-20 PROCEDURE — 85652 RBC SED RATE AUTOMATED: CPT | Performed by: INTERNAL MEDICINE

## 2024-05-20 PROCEDURE — 84550 ASSAY OF BLOOD/URIC ACID: CPT | Performed by: INTERNAL MEDICINE

## 2024-05-20 PROCEDURE — 80061 LIPID PANEL: CPT | Performed by: INTERNAL MEDICINE

## 2024-05-20 PROCEDURE — 3074F SYST BP LT 130 MM HG: CPT | Performed by: INTERNAL MEDICINE

## 2024-05-20 RX ORDER — POTASSIUM CHLORIDE 750 MG/1
10 TABLET, FILM COATED, EXTENDED RELEASE ORAL DAILY
Qty: 30 TABLET | Refills: 0 | Status: SHIPPED | OUTPATIENT
Start: 2024-05-20

## 2024-05-20 RX ORDER — AMLODIPINE BESYLATE 10 MG/1
5 TABLET ORAL DAILY
COMMUNITY
Start: 2024-05-07 | End: 2024-11-03

## 2024-05-20 RX ORDER — DOXYCYCLINE HYCLATE 100 MG
100 TABLET ORAL 2 TIMES DAILY
Qty: 28 TABLET | Refills: 0 | Status: SHIPPED | OUTPATIENT
Start: 2024-05-20 | End: 2024-06-03

## 2024-05-20 RX ORDER — FLUCONAZOLE 150 MG/1
150 TABLET ORAL
Qty: 3 TABLET | Refills: 0 | Status: SHIPPED | OUTPATIENT
Start: 2024-05-20 | End: 2024-05-27

## 2024-05-20 NOTE — PROGRESS NOTES
Chief Complaint  Wrist Pain (Left wrist pain /Just came up Saturday night without injury ), Extremity Weakness (Leg weakness /May 12 ), Joint Swelling, Rash (The rash started that weekend after starting the mediation (she had a mediation change on April 17)  Due to medication change /Cardiology was made aware due to them changing the medication ), and Tick Removal (Patient found a tick April 24 she removed it then )    Subjective          Lianne Bardales presents to [facility]  History of Present Illness  Patient went to cardiology where stopped BB and started on amlodipine instead as well as farxiga. Patient reports continued leg pain with being on amlodipine. She also reports finding a tick bite and subsequently developing a rash. Patient unsure how long the tick was attached. Patient also with right wrist pain and inflammation and swelling.   Coronary artery disease status post percutaneous coronary intervention- patient reports he HR seems to stay up around 100 at home.   COPD- patient does not think breathing has changed with stopping toprol.   Patient has been trying to lose weight. She is cutting back on sugars. She has wedding in 1 month.     Current Outpatient Medications   Medication Instructions    albuterol sulfate  (90 Base) MCG/ACT inhaler USE 2 INHALATIONS BY MOUTH EVERY 4 HOURS AS NEEDED FOR WHEEZING  OR SHORTNESS OF AIR    amLODIPine (NORVASC) 5 mg, Oral, Daily    Aspirin Low Dose 81 mg, Oral, Daily    Cetirizine HCl 10 MG capsule Oral    doxycycline (VIBRAMYICN) 100 mg, Oral, 2 Times Daily, Avoid dairy within 2 hours of dose    esomeprazole (NEXIUM) 40 mg, Oral, Every Morning Before Breakfast    fluconazole (DIFLUCAN) 150 mg, Oral, Every 3 Days    magnesium 30 mg, Oral, 2 Times Daily    ondansetron (ZOFRAN) 4 MG tablet TAKE 1 TABLET BY MOUTH EVERY 8  HOURS AS NEEDED NAUSEA AND  VOMITING    rosuvastatin (CRESTOR) 40 mg, Oral, Every Night at  "Bedtime    spironolactone-hydrochlorothiazide (ALDACTAZIDE) 25-25 MG tablet 1 tablet, Oral, Daily    Trelegy Ellipta 100-62.5-25 MCG/ACT inhaler USE 1 INHALATION BY MOUTH ONCE  DAILY AT THE SAME TIME EACH DAY    valACYclovir (VALTREX) 500 mg, Oral     The following portions of the patient's history were reviewed and updated as appropriate: allergies, current medications, past family history, past medical history, past social history, past surgical history, and problem list.    Objective   Vital Signs:   /74 (BP Location: Left arm)   Pulse 96   Temp 96.5 °F (35.8 °C) (Temporal)   Ht 163.8 cm (64.5\")   Wt 56.9 kg (125 lb 6.4 oz)   SpO2 94%   BMI 21.19 kg/m²     BP Readings from Last 3 Encounters:   05/20/24 109/74   02/22/24 134/73   01/18/24 131/85     Wt Readings from Last 3 Encounters:   05/20/24 56.9 kg (125 lb 6.4 oz)   02/22/24 63 kg (139 lb)   01/18/24 60.6 kg (133 lb 9.6 oz)     BMI is within normal parameters. No other follow-up for BMI required.     Physical Exam   Appearance: No acute distress, well-nourished  Head: normocephalic, atraumatic  Eyes: extraocular movements intact, no scleral icterus, no conjunctival injection  Ears, Nose, and Throat: external ears normal, nares patent, moist mucous membranes  Cardiovascular: regular rate and rhythm. no murmurs, rubs, or gallops. no edema  Respiratory: breathing comfortably, symmetric chest rise, clear to auscultation bilaterally. No wheezes, rales, or rhonchi.  Neuro: alert and oriented to time, place, and person. Normal gait  Psych: normal mood and affect     Result Review :   The following data was reviewed by: Sam Hampton Jr, MD on 05/20/2024:  Common labs          7/17/2023    12:14 8/31/2023    10:59 1/18/2024    15:27   Common Labs   Glucose 73   93    BUN 24   24    Creatinine 1.13  1.10  1.03    Sodium 138   136    Potassium 3.6   4.4    Chloride 98   99    Calcium 9.7   9.3    Albumin 4.4   4.2    Total Bilirubin 0.4   0.6  "   Alkaline Phosphatase 76   76    AST (SGOT) 37   34    ALT (SGPT) 18   22    WBC 8.50   8.13    Hemoglobin 13.7   14.3    Hematocrit 41.6   41.9    Platelets 311   240    Total Cholesterol 148   176    Triglycerides 126   163    HDL Cholesterol 58   69    LDL Cholesterol  68   80        Lab Results   Component Value Date    COVID19 NOT DETECTED 08/21/2020    INR 1.0 01/11/2023    BILIRUBINUR Negative 07/17/2023        Assessment and Plan    Diagnoses and all orders for this visit:    1. Primary hypertension (Primary)  -     CBC & Differential  -     Comprehensive Metabolic Panel    2. Chronic obstructive pulmonary disease, unspecified COPD type  -     Cancel: Coenzyme Q10, Total; Future    3. Mixed hyperlipidemia  -     Lipid Panel  -     Coenzyme Q10, Total    4. Coronary artery disease of native artery of native heart with stable angina pectoris    5. Tick bite of left lower leg, initial encounter  -     Tick Panel  -     Cancel: Lyme Disease Total Antibody With Reflex to Immunoassay  -     Cancel: Ehrlichia Profile DNA PCR  -     Cancel: Rickettsia Species DNA, Real-Time PCR  -     doxycycline (VIBRAMYICN) 100 MG tablet; Take 1 tablet by mouth 2 (Two) Times a Day for 14 days. Avoid dairy within 2 hours of dose  Dispense: 28 tablet; Refill: 0  -     fluconazole (Diflucan) 150 MG tablet; Take 1 tablet by mouth Every 3 (Three) Days for 3 doses.  Dispense: 3 tablet; Refill: 0    6. Polyarthralgia  -     Uric Acid  -     C-reactive Protein  -     Sedimentation Rate  -     ANTWON        Medications Discontinued During This Encounter   Medication Reason    clopidogrel (PLAVIX) 75 MG tablet *Therapy completed    vitamin D (ERGOCALCIFEROL) 1.25 MG (02754 UT) capsule capsule *Therapy completed    metoprolol succinate XL (TOPROL-XL) 50 MG 24 hr tablet *Therapy completed        Follow Up   Return in about 1 week (around 5/27/2024).  Patient was given instructions and counseling regarding her condition or for health maintenance  advice. Please see specific information pulled into the AVS if appropriate.       Sam Hampton Jr, MD  05/20/24  12:20 EDT

## 2024-05-21 ENCOUNTER — TELEPHONE (OUTPATIENT)
Dept: INTERNAL MEDICINE | Facility: CLINIC | Age: 79
End: 2024-05-21
Payer: MEDICARE

## 2024-05-21 NOTE — PROGRESS NOTES
Got a call from the lab that patient's potassium was critically low at 2.5. Called patient and she states she feels fine no chest pain no palpitations however she has had some leg cramping for the past 2 months or so for which she has started magnesium. She does not remember having low potassium previously. I explained to her that I would like for her to go get some potassium tonight to start some replacement ASAP as it is very dangerous and the potassium as low as hers could cause a heart attack or cause her heart to stop beating. She stated understanding of this however states she would prefer to  the medicine in the morning because she feels fine. I explained that sometimes you do feel fine and all of a sudden your heart stops beating however she stated she appreciated the call but she will get the medicine tomorrow morning and be in touch with Dr. Weaver office for next steps. She requested [facility]

## 2024-05-21 NOTE — TELEPHONE ENCOUNTER
Caller: DWAYNE JAMES    Relationship: Other Relative    Best call back number: [phone]     What is the best time to reach you: ANY     Who are you requesting to speak with (clinical staff, provider,  specific staff member): DOCTOR ALBERTINA OR NURSE    Do you know the name of the person who called: DWAYNE     What was the call regarding: PATIENT DAUGHTER IS REQUESTING A CALL BACK REGARDING PATIENTS RESULTS FOR BLOOD WORK. PATIENT GOT A CALL LAST NIGHT REGARDING LOW POTASSIUM LEVELS AND PATIENT DAUGHTER WOULD LIKE TO KNOW MORE REGARDING THOSE RESULTS AND NEXT STEPS TO HELP WITH LOW POTASSIUM LEVELS.    Is it okay if the provider responds through [employer]: NO

## 2024-05-22 ENCOUNTER — LAB (OUTPATIENT)
Dept: LAB | Facility: HOSPITAL | Age: 79
End: 2024-05-22
Payer: MEDICARE

## 2024-05-22 ENCOUNTER — TELEPHONE (OUTPATIENT)
Dept: INTERNAL MEDICINE | Facility: CLINIC | Age: 79
End: 2024-05-22
Payer: MEDICARE

## 2024-05-22 DIAGNOSIS — I10 PRIMARY HYPERTENSION: ICD-10-CM

## 2024-05-22 DIAGNOSIS — M25.50 POLYARTHRALGIA: ICD-10-CM

## 2024-05-22 DIAGNOSIS — N17.9 AKI (ACUTE KIDNEY INJURY): Primary | ICD-10-CM

## 2024-05-22 DIAGNOSIS — I25.118 CORONARY ARTERY DISEASE OF NATIVE ARTERY OF NATIVE HEART WITH STABLE ANGINA PECTORIS: ICD-10-CM

## 2024-05-22 DIAGNOSIS — J44.9 CHRONIC OBSTRUCTIVE PULMONARY DISEASE, UNSPECIFIED COPD TYPE: ICD-10-CM

## 2024-05-22 DIAGNOSIS — S80.862A TICK BITE OF LEFT LOWER LEG, INITIAL ENCOUNTER: ICD-10-CM

## 2024-05-22 DIAGNOSIS — E78.2 MIXED HYPERLIPIDEMIA: ICD-10-CM

## 2024-05-22 DIAGNOSIS — N17.9 AKI (ACUTE KIDNEY INJURY): ICD-10-CM
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
## 2024-05-22 LAB
ANA SER QL: NEGATIVE
ANION GAP SERPL CALCULATED.3IONS-SCNC: 11.6 MMOL/L (ref 5–15)
B BURGDOR IGG+IGM SER QL IA: NEGATIVE
BUN SERPL-MCNC: 25 MG/DL (ref 8–23)
BUN/CREAT SERPL: 19.7 (ref 7–25)
CALCIUM SPEC-SCNC: 9.8 MG/DL (ref 8.6–10.5)
CHLORIDE SERPL-SCNC: 97 MMOL/L (ref 98–107)
CO2 SERPL-SCNC: 25.4 MMOL/L (ref 22–29)
CREAT SERPL-MCNC: 1.27 MG/DL (ref 0.57–1)
EGFRCR SERPLBLD CKD-EPI 2021: 43.4 ML/MIN/1.73
GLUCOSE SERPL-MCNC: 168 MG/DL (ref 65–99)
POTASSIUM SERPL-SCNC: 3.6 MMOL/L (ref 3.5–5.2)
SODIUM SERPL-SCNC: 134 MMOL/L (ref 136–145)

## 2024-05-22 PROCEDURE — 80048 BASIC METABOLIC PNL TOTAL CA: CPT

## 2024-05-22 PROCEDURE — 82542 COL CHROMOTOGRAPHY QUAL/QUAN: CPT

## 2024-05-22 PROCEDURE — 36415 COLL VENOUS BLD VENIPUNCTURE: CPT

## 2024-05-22 NOTE — TELEPHONE ENCOUNTER
Called PT, PT is aware and confirmed.   No further question or concerns       Would hold diuretic (aldactazide) for now. Recheck BMP this week. Supplement potassium and magnesium.     Elevated general inflammatory markers.

## 2024-05-22 NOTE — TELEPHONE ENCOUNTER
Daughter called in, on [employer], with concerns about patients potassium. Has been on oral potassium for 2 days. Was also concerned with her GFR that was viewed on [employer].     Informed daughter that Dr. Hampton had not yet reviewed the labs, but that I would ask the providers in office about the current concerns.

## 2024-05-22 NOTE — TELEPHONE ENCOUNTER
Called patients daughter back about provider instruction. Relayed all information, daughter verbalized understanding of this.     Informed daughter to call back with any other concerns.

## 2024-05-22 NOTE — TELEPHONE ENCOUNTER
Spoke with Imani VALDEZ in office about daughters concerns about patient.     Imani stated that the correct time for repeat labs to recheck potassium would be on the 28th at appointment. If patient were to have any chest pain or palpitations to go directly to ER to monitor on EKG.     Stated that patient can push fluids at home a this time.     No immediate concerns, however if any problems came up to go directly to ER.

## 2024-05-25 LAB
A PHAGOCYTOPH DNA BLD QL NAA+PROBE: NEGATIVE
E CHAFFEENSIS DNA BLD QL NAA+PROBE: NEGATIVE
RICKETTSIA RICKETTSII DNA, RT: NOT DETECTED

## 2024-05-28 ENCOUNTER — OFFICE VISIT (OUTPATIENT)
Dept: INTERNAL MEDICINE | Facility: CLINIC | Age: 79
End: 2024-05-28
Payer: MEDICARE

## 2024-05-28 ENCOUNTER — LAB (OUTPATIENT)
Dept: LAB | Facility: HOSPITAL | Age: 79
End: 2024-05-28
Payer: MEDICARE

## 2024-05-28 VITALS
HEIGHT: 65 IN | OXYGEN SATURATION: 94 % | DIASTOLIC BLOOD PRESSURE: 80 MMHG | TEMPERATURE: 96.3 F | BODY MASS INDEX: 21.66 KG/M2 | WEIGHT: 130 LBS | HEART RATE: 102 BPM | SYSTOLIC BLOOD PRESSURE: 135 MMHG

## 2024-05-28 DIAGNOSIS — S80.862A TICK BITE OF LEFT LOWER LEG, INITIAL ENCOUNTER: ICD-10-CM

## 2024-05-28 DIAGNOSIS — I10 PRIMARY HYPERTENSION: ICD-10-CM

## 2024-05-28 DIAGNOSIS — I10 PRIMARY HYPERTENSION: Primary | ICD-10-CM
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
## 2024-05-28 DIAGNOSIS — I50.32 CHRONIC DIASTOLIC CONGESTIVE HEART FAILURE: ICD-10-CM

## 2024-05-28 LAB
ANION GAP SERPL CALCULATED.3IONS-SCNC: 12.7 MMOL/L (ref 5–15)
BUN SERPL-MCNC: 24 MG/DL (ref 8–23)
BUN/CREAT SERPL: 22.2 (ref 7–25)
CALCIUM SPEC-SCNC: 9.7 MG/DL (ref 8.6–10.5)
CHLORIDE SERPL-SCNC: 98 MMOL/L (ref 98–107)
CO2 SERPL-SCNC: 25.3 MMOL/L (ref 22–29)
CREAT SERPL-MCNC: 1.08 MG/DL (ref 0.57–1)
CRP SERPL-MCNC: 0.35 MG/DL (ref 0–0.5)
EGFRCR SERPLBLD CKD-EPI 2021: 52.7 ML/MIN/1.73
GLUCOSE SERPL-MCNC: 98 MG/DL (ref 65–99)
POTASSIUM SERPL-SCNC: 3.9 MMOL/L (ref 3.5–5.2)
SODIUM SERPL-SCNC: 136 MMOL/L (ref 136–145)
UBIQUINONE10 SERPL-MCNC: 0.51 UG/ML (ref 0.37–2.2)

## 2024-05-28 PROCEDURE — 99214 OFFICE O/P EST MOD 30 MIN: CPT | Performed by: INTERNAL MEDICINE

## 2024-05-28 PROCEDURE — 1126F AMNT PAIN NOTED NONE PRSNT: CPT | Performed by: INTERNAL MEDICINE

## 2024-05-28 PROCEDURE — 3075F SYST BP GE 130 - 139MM HG: CPT | Performed by: INTERNAL MEDICINE

## 2024-05-28 PROCEDURE — 3079F DIAST BP 80-89 MM HG: CPT | Performed by: INTERNAL MEDICINE

## 2024-05-28 PROCEDURE — 82542 COL CHROMOTOGRAPHY QUAL/QUAN: CPT | Performed by: INTERNAL MEDICINE

## 2024-05-28 PROCEDURE — 86140 C-REACTIVE PROTEIN: CPT | Performed by: INTERNAL MEDICINE

## 2024-05-28 PROCEDURE — 80048 BASIC METABOLIC PNL TOTAL CA: CPT | Performed by: INTERNAL MEDICINE

## 2024-05-28 RX ORDER — SPIRONOLACTONE 50 MG/1
50 TABLET, FILM COATED ORAL DAILY
Qty: 90 TABLET | Refills: 0 | Status: SHIPPED | OUTPATIENT
Start: 2024-05-28

## 2024-05-28 NOTE — PROGRESS NOTES
"Chief Complaint  Hypertension (Follow up )    Subjective          Lianne Bardales presents to [facility]  History of Present Illness  Hypertension- they have noticed elevation in Blood Pressure since stopping diuresis. Patient has started on potassium as well. Patient has been on amlodipine 5mg.   Her legs are feeling better. However, her legs are swelling without her diuretic. She has also had a cough recently. She also has gained weight.       Current Outpatient Medications   Medication Instructions    albuterol sulfate  (90 Base) MCG/ACT inhaler USE 2 INHALATIONS BY MOUTH EVERY 4 HOURS AS NEEDED FOR WHEEZING  OR SHORTNESS OF AIR    amLODIPine (NORVASC) 5 mg, Oral, Daily    Aspirin Low Dose 81 mg, Oral, Daily    Cetirizine HCl 10 MG capsule Oral    doxycycline (VIBRAMYICN) 100 mg, Oral, 2 Times Daily, Avoid dairy within 2 hours of dose    empagliflozin (JARDIANCE) 10 mg, Oral, Daily    esomeprazole (NEXIUM) 40 mg, Oral, Every Morning Before Breakfast    fluconazole (DIFLUCAN) 150 mg, Oral, Every 3 Days    magnesium 30 mg, Oral, 2 Times Daily    ondansetron (ZOFRAN) 4 MG tablet TAKE 1 TABLET BY MOUTH EVERY 8  HOURS AS NEEDED NAUSEA AND  VOMITING    rosuvastatin (CRESTOR) 40 mg, Oral, Every Night at Bedtime    spironolactone (ALDACTONE) 50 mg, Oral, Daily    Trelegy Ellipta 100-62.5-25 MCG/ACT inhaler USE 1 INHALATION BY MOUTH ONCE  DAILY AT THE SAME TIME EACH DAY    valACYclovir (VALTREX) 500 mg, Oral       The following portions of the patient's history were reviewed and updated as appropriate: allergies, current medications, past family history, past medical history, past social history, past surgical history, and problem list.      Objective   Vital Signs:   /80 (BP Location: Right arm)   Pulse 102   Temp 96.3 °F (35.7 °C) (Temporal)   Ht 163.8 cm (64.5\")   Wt 59 kg (130 lb)   SpO2 94%   BMI 21.97 kg/m²     BP Readings from Last 3 " Encounters:   05/28/24 135/80   05/20/24 109/74   02/22/24 134/73     Wt Readings from Last 3 Encounters:   05/28/24 59 kg (130 lb)   05/20/24 56.9 kg (125 lb 6.4 oz)   02/22/24 63 kg (139 lb)     BMI is within normal parameters. No other follow-up for BMI required.     Physical Exam   Appearance: No acute distress, well-nourished  Head: normocephalic, atraumatic  Eyes: extraocular movements intact, no scleral icterus, no conjunctival injection  Ears, Nose, and Throat: external ears normal, nares patent, moist mucous membranes  Cardiovascular: regular rate. 1+ pitting edema BLE  Respiratory: breathing comfortably, symmetric chest rise.  Neuro: alert and oriented to time, place, and person. Normal gait  Psych: normal mood and affect     Result Review :   The following data was reviewed by: Sam Hampton Jr, MD on 05/28/2024:  Common labs          1/18/2024    15:27 5/20/2024    11:59 5/22/2024    14:06   Common Labs   Glucose 93  88  168    BUN 24  26  25    Creatinine 1.03  1.35  1.27    Sodium 136  130  134    Potassium 4.4  2.5  3.6    Chloride 99  90  97    Calcium 9.3  9.5  9.8    Albumin 4.2  4.5     Total Bilirubin 0.6  0.4     Alkaline Phosphatase 76  95     AST (SGOT) 34  44     ALT (SGPT) 22  30     WBC 8.13  10.37     Hemoglobin 14.3  15.6     Hematocrit 41.9  45.8     Platelets 240  265     Total Cholesterol 176  156     Triglycerides 163  57     HDL Cholesterol 69  93     LDL Cholesterol  80  51     Uric Acid  2.9         Lab Results   Component Value Date    COVID19 NOT DETECTED 08/21/2020    INR 1.0 01/11/2023    BILIRUBINUR Negative 07/17/2023          Assessment and Plan    Diagnoses and all orders for this visit:    1. Primary hypertension (Primary)  Comments:  check labs now and in 1 week. monitor BP with medication changes. stay on amlodipine 5mg for now.  Orders:  -     Basic metabolic panel  -     Basic metabolic panel; Future    2. Chronic diastolic congestive heart  failure  Comments:  stop aldactazide with concerns for hypokelmia on HCTZ. start aldactone 50mg daily and restart SGLT-2 with jardiance. f/u in 3 weeks. check labs.  Orders:  -     spironolactone (Aldactone) 50 MG tablet; Take 1 tablet by mouth Daily.  Dispense: 90 tablet; Refill: 0  -     Basic metabolic panel  -     empagliflozin (Jardiance) 10 MG tablet tablet; Take 1 tablet by mouth Daily.  Dispense: 30 tablet; Refill: 0  -     Basic metabolic panel; Future  -     Coenzyme Q10, Total    3. Tick bite of left lower leg, initial encounter  Comments:  labs neg, but symptoms seemingly fit. would complet doxycyline course.  Orders:  -     C-reactive Protein        Medications Discontinued During This Encounter   Medication Reason    potassium chloride 10 MEQ CR tablet *Therapy completed    spironolactone-hydrochlorothiazide (ALDACTAZIDE) 25-25 MG tablet Alternate therapy       Follow Up   Return in about 3 weeks (around 6/18/2024).  Patient was given instructions and counseling regarding her condition or for health maintenance advice. Please see specific information pulled into the AVS if appropriate.       Sam Hampton Jr, MD  05/28/24  12:21 EDT

## 2024-06-03 LAB — UBIQUINONE10 SERPL-MCNC: 0.85 UG/ML (ref 0.37–2.2)

## 2024-06-04 ENCOUNTER — LAB (OUTPATIENT)
Dept: LAB | Facility: HOSPITAL | Age: 79
End: 2024-06-04
Payer: MEDICARE

## 2024-06-04 DIAGNOSIS — N17.9 AKI (ACUTE KIDNEY INJURY): ICD-10-CM

## 2024-06-04 DIAGNOSIS — N17.9 AKI (ACUTE KIDNEY INJURY): Primary | ICD-10-CM

## 2024-06-04 LAB
ANION GAP SERPL CALCULATED.3IONS-SCNC: 10.3 MMOL/L (ref 5–15)
BUN SERPL-MCNC: 15 MG/DL (ref 8–23)
BUN/CREAT SERPL: 18.3 (ref 7–25)
CALCIUM SPEC-SCNC: 9.7 MG/DL (ref 8.6–10.5)
CHLORIDE SERPL-SCNC: 104 MMOL/L (ref 98–107)
CO2 SERPL-SCNC: 24.7 MMOL/L (ref 22–29)
CREAT SERPL-MCNC: 0.82 MG/DL (ref 0.57–1)
EGFRCR SERPLBLD CKD-EPI 2021: 73.3 ML/MIN/1.73
GLUCOSE SERPL-MCNC: 93 MG/DL (ref 65–99)
POTASSIUM SERPL-SCNC: 4.2 MMOL/L (ref 3.5–5.2)
SODIUM SERPL-SCNC: 139 MMOL/L (ref 136–145)

## 2024-06-04 PROCEDURE — 80048 BASIC METABOLIC PNL TOTAL CA: CPT

## 2024-06-04 PROCEDURE — 36415 COLL VENOUS BLD VENIPUNCTURE: CPT

## 2024-06-13 NOTE — PROGRESS NOTES
Chief Complaint  Hypertension (Follow up ) and Cough (3 days )    Subjective          Lianne Bardales presents to [facility]  History of Present Illness  Hypertension- patient has echo scheduled for next week. Patient is on losartan, jardiance, and aldactone. Patient is noww off amlodipine. She takes lasix prn now.   patient reports having cough for approx 3 days. Patient denies sick contacts, but has recently been undergoing air travel.   Hyperlipidemia- doing well on statin  COPD- patient reports breathing is at baseline. Patient denies shortness of breath, dyspnea on exertion, chest pain. Patient reports compliance with trelegy.       Current Outpatient Medications   Medication Instructions    albuterol sulfate  (90 Base) MCG/ACT inhaler USE 2 INHALATIONS BY MOUTH EVERY 4 HOURS AS NEEDED FOR WHEEZING  OR SHORTNESS OF AIR    Aspirin Low Dose 81 mg, Oral, Daily    azithromycin (Zithromax Z-Devin) 250 MG tablet Take 2 tablets by mouth on day 1, then 1 tablet daily on days 2-5    Cetirizine HCl 10 MG capsule Oral    empagliflozin (JARDIANCE) 10 mg, Oral, Daily    esomeprazole (NEXIUM) 40 mg, Oral, Every Morning Before Breakfast    furosemide (LASIX) 40 mg, Oral, Daily    losartan (COZAAR) 25 mg, Oral, Daily    magnesium 30 mg, Oral, 2 Times Daily    Nirmatrelvir & Ritonavir, 300mg/100mg, (PAXLOVID) 3 tablets, Oral, 2 Times Daily    ondansetron (ZOFRAN) 4 MG tablet TAKE 1 TABLET BY MOUTH EVERY 8  HOURS AS NEEDED NAUSEA AND  VOMITING    rosuvastatin (CRESTOR) 40 mg, Oral, Every Night at Bedtime    spironolactone (ALDACTONE) 50 mg, Oral, Daily    Trelegy Ellipta 100-62.5-25 MCG/ACT inhaler USE 1 INHALATION BY MOUTH ONCE  DAILY AT THE SAME TIME EACH DAY       The following portions of the patient's history were reviewed and updated as appropriate: allergies, current medications, past family history, past medical history, past social history, past surgical history,  "and problem list.    Objective   Vital Signs:   /78 (BP Location: Right arm)   Pulse 97   Temp 96.8 °F (36 °C) (Temporal)   Ht 163.8 cm (64.5\")   Wt 57.8 kg (127 lb 6.4 oz)   SpO2 91%   BMI 21.53 kg/m²     BP Readings from Last 3 Encounters:   06/18/24 116/78   05/28/24 135/80   05/20/24 109/74     Wt Readings from Last 3 Encounters:   06/18/24 57.8 kg (127 lb 6.4 oz)   05/28/24 59 kg (130 lb)   05/20/24 56.9 kg (125 lb 6.4 oz)     BMI is within normal parameters. No other follow-up for BMI required.     Physical Exam   Appearance: No acute distress, well-nourished  Head: normocephalic, atraumatic  Eyes: extraocular movements intact, no scleral icterus, no conjunctival injection  Ears, Nose, and Throat: external ears normal, nares patent, moist mucous membranes  Cardiovascular: regular rate and rhythm. no murmurs, rubs, or gallops. no edema  Respiratory: breathing comfortably, symmetric chest rise, clear to auscultation bilaterally. No wheezes, rales, or rhonchi.  Neuro: alert and oriented to time, place, and person. Normal gait  Psych: normal mood and affect     Result Review :   The following data was reviewed by: Sam Hampton Jr, MD on 06/18/2024:  Common labs          5/22/2024    14:06 5/28/2024    12:30 6/4/2024    12:28   Common Labs   Glucose 168  98  93    BUN 25  24  15    Creatinine 1.27  1.08  0.82    Sodium 134  136  139    Potassium 3.6  3.9  4.2    Chloride 97  98  104    Calcium 9.8  9.7  9.7        Lab Results   Component Value Date    SARSANTIGEN Not Detected 06/18/2024    COVID19 NOT DETECTED 08/21/2020    FLUAAG Not Detected 06/18/2024    FLUBAG Not Detected 06/18/2024    RSV negative 06/18/2024    INR 1.0 01/11/2023    BILIRUBINUR Negative 07/17/2023        Assessment and Plan    Diagnoses and all orders for this visit:    1. Primary hypertension (Primary)  Comments:  improved control. cont monitoring.    2. Mixed hyperlipidemia  Comments:  cont statin    3. Chronic " diastolic congestive heart failure  Comments:  currently on ARB, aldactone, and jardiance. holding BB at this time.    4. Breast cancer screening by mammogram  -     Mammo Screening Digital Tomosynthesis Bilateral With CAD; Future    5. Postmenopausal  -     DEXA Bone Density Axial; Future    6. Upper respiratory tract infection, unspecified type  Comments:  pt is covid positive. will Rx Zpak and paxlovid given high risk individual.  Orders:  -     POCT RSV  -     POCT SARS-CoV-2 Antigen GABY + Flu  -     azithromycin (Zithromax Z-Devin) 250 MG tablet; Take 2 tablets by mouth on day 1, then 1 tablet daily on days 2-5  Dispense: 6 tablet; Refill: 0  -     Nirmatrelvir & Ritonavir, 300mg/100mg, (PAXLOVID); Take 3 tablets by mouth 2 (Two) Times a Day for 5 days.  Dispense: 30 tablet; Refill: 0        Medications Discontinued During This Encounter   Medication Reason    valACYclovir (VALTREX) 500 MG tablet *Therapy completed    amLODIPine (NORVASC) 10 MG tablet *Therapy completed        Follow Up   Return in about 3 months (around 9/18/2024).  Patient was given instructions and counseling regarding her condition or for health maintenance advice. Please see specific information pulled into the AVS if appropriate.       Sam Hampton Jr, MD  06/18/24  15:12 EDT

## 2024-06-18 ENCOUNTER — OFFICE VISIT (OUTPATIENT)
Dept: INTERNAL MEDICINE | Facility: CLINIC | Age: 79
End: 2024-06-18
Payer: MEDICARE

## 2024-06-18 VITALS
OXYGEN SATURATION: 91 % | DIASTOLIC BLOOD PRESSURE: 78 MMHG | BODY MASS INDEX: 21.23 KG/M2 | HEIGHT: 65 IN | SYSTOLIC BLOOD PRESSURE: 116 MMHG | HEART RATE: 97 BPM | TEMPERATURE: 96.8 F | WEIGHT: 127.4 LBS

## 2024-06-18 DIAGNOSIS — I10 PRIMARY HYPERTENSION: Primary | ICD-10-CM

## 2024-06-18 DIAGNOSIS — I50.32 CHRONIC DIASTOLIC CONGESTIVE HEART FAILURE: ICD-10-CM

## 2024-06-18 DIAGNOSIS — J06.9 UPPER RESPIRATORY TRACT INFECTION, UNSPECIFIED TYPE: ICD-10-CM

## 2024-06-18 DIAGNOSIS — Z78.0 POSTMENOPAUSAL: ICD-10-CM

## 2024-06-18 DIAGNOSIS — E78.2 MIXED HYPERLIPIDEMIA: ICD-10-CM

## 2024-06-18 DIAGNOSIS — Z12.31 BREAST CANCER SCREENING BY MAMMOGRAM: ICD-10-CM

## 2024-06-18 LAB
EXPIRATION DATE: 0
EXPIRATION DATE: 0
FLUAV AG UPPER RESP QL IA.RAPID: NOT DETECTED
FLUBV AG UPPER RESP QL IA.RAPID: NOT DETECTED
INTERNAL CONTROL: NORMAL
INTERNAL CONTROL: NORMAL
Lab: 0
Lab: 0
RSV AG SPEC QL: NEGATIVE
SARS-COV-2 AG UPPER RESP QL IA.RAPID: NOT DETECTED

## 2024-06-18 PROCEDURE — 3078F DIAST BP <80 MM HG: CPT | Performed by: INTERNAL MEDICINE

## 2024-06-18 PROCEDURE — 99214 OFFICE O/P EST MOD 30 MIN: CPT | Performed by: INTERNAL MEDICINE

## 2024-06-18 PROCEDURE — 3074F SYST BP LT 130 MM HG: CPT | Performed by: INTERNAL MEDICINE

## 2024-06-18 PROCEDURE — 87428 SARSCOV & INF VIR A&B AG IA: CPT | Performed by: INTERNAL MEDICINE

## 2024-06-18 PROCEDURE — 1126F AMNT PAIN NOTED NONE PRSNT: CPT | Performed by: INTERNAL MEDICINE

## 2024-06-18 PROCEDURE — 87807 RSV ASSAY W/OPTIC: CPT | Performed by: INTERNAL MEDICINE

## 2024-06-18 RX ORDER — LOSARTAN POTASSIUM 25 MG/1
25 TABLET ORAL DAILY
COMMUNITY
Start: 2024-06-06 | End: 2025-06-06

## 2024-06-18 RX ORDER — FUROSEMIDE 40 MG/1
40 TABLET ORAL DAILY
COMMUNITY
Start: 2024-06-06

## 2024-06-18 RX ORDER — AZITHROMYCIN 250 MG/1
TABLET, FILM COATED ORAL
Qty: 6 TABLET | Refills: 0 | Status: SHIPPED | OUTPATIENT
Start: 2024-06-18

## 2024-06-24 DIAGNOSIS — I50.32 CHRONIC DIASTOLIC CONGESTIVE HEART FAILURE: ICD-10-CM

## 2024-06-24 RX ORDER — EMPAGLIFLOZIN 10 MG/1
10 TABLET, FILM COATED ORAL DAILY
Qty: 30 TABLET | Refills: 0 | Status: SHIPPED | OUTPATIENT
Start: 2024-06-24

## 2024-06-24 RX ORDER — FLUTICASONE FUROATE, UMECLIDINIUM BROMIDE AND VILANTEROL TRIFENATATE 100; 62.5; 25 UG/1; UG/1; UG/1
POWDER RESPIRATORY (INHALATION)
Qty: 180 EACH | Refills: 3 | Status: SHIPPED | OUTPATIENT
Start: 2024-06-24

## 2024-06-27 ENCOUNTER — TELEPHONE (OUTPATIENT)
Dept: INTERNAL MEDICINE | Facility: CLINIC | Age: 79
End: 2024-06-27
Payer: MEDICARE

## 2024-06-27 NOTE — TELEPHONE ENCOUNTER
Caller: Lianne Bardales    Relationship: Self    Best call back number: [phone]    Requested Prescriptions:   DICLOFENAC GELL (NOT IN LIST)       Pharmacy where request should be sent:  [address] - [phone]  - [phone]     Last office visit with prescribing clinician: 6/18/2024   Last telemedicine visit with prescribing clinician: Visit date not found   Next office visit with prescribing clinician: 9/23/2024     Additional details provided by patient: PATIENT IS REQUESTING DICLOFENAC GEL REFILL. PATIENT SAID SHE HASN;T HAD THE PRESCRIPTION FOR A WHILE AND USES IT AS NEEDED FOR KNEE PAIN    Does the patient have less than a 3 day supply:  [x] Yes  [] No    Would you like a call back once the refill request has been completed: [] Yes [] No    If the office needs to give you a call back, can they leave a voicemail: [x] Yes [] No    Paul Cavazos   06/27/24 09:32 EDT

## 2024-07-15 ENCOUNTER — TELEPHONE (OUTPATIENT)
Dept: INTERNAL MEDICINE | Facility: CLINIC | Age: 79
End: 2024-07-15
Payer: MEDICARE

## 2024-07-15 NOTE — TELEPHONE ENCOUNTER
Caller: Lianne Bardales    Relationship to patient: Self    Best call back number:     [phone]        Patient is needing: STATED SHE WAS DIAGNOSED WITH A UTI AND STOPPED TAKING Jardiance 10 MG tablet tablet  AS IT CAN CAUSE THEM. SHE WOULD LIKE TO KNOW FI SHE SHOULD START BACK ON THIS OR WHAT SHE SHOULD DO MOVING FORWARD.

## 2024-07-16 NOTE — TELEPHONE ENCOUNTER
Name: Lianne Bardales    Relationship: Self    Best Callback Number: [phone]    HUB PROVIDED THE RELAY MESSAGE FROM THE OFFICE   PATIENT HAS FURTHER QUESTIONS AND WOULD LIKE A CALL BACK AT THE FOLLOWING PHONE NUMBER [phone]    ADDITIONAL INFORMATION:PATIENT WOULD LIKE TO KNOW IF MEDICATION WAS CAUSING UTI HOW LONG WOULD IT TAKE HER TO GET ANOTHER UTI.

## 2024-07-16 NOTE — TELEPHONE ENCOUNTER
Left message for patient to return call to clinic.    HUB TO READ:  After she has completed antibiotics and her symptoms have cleared, would restart and try again

## 2024-07-30 ENCOUNTER — HOSPITAL ENCOUNTER (OUTPATIENT)
Dept: BONE DENSITY | Facility: HOSPITAL | Age: 79
Discharge: HOME | End: 2024-07-30
Payer: MEDICARE

## 2024-07-30 ENCOUNTER — HOSPITAL ENCOUNTER (OUTPATIENT)
Dept: MAMMOGRAPHY | Facility: HOSPITAL | Age: 79
Discharge: HOME | End: 2024-07-30
Payer: MEDICARE

## 2024-07-30 DIAGNOSIS — I50.32 CHRONIC DIASTOLIC CONGESTIVE HEART FAILURE: ICD-10-CM

## 2024-07-30 DIAGNOSIS — Z12.31 BREAST CANCER SCREENING BY MAMMOGRAM: ICD-10-CM

## 2024-07-30 DIAGNOSIS — Z78.0 POSTMENOPAUSAL: ICD-10-CM

## 2024-07-30 PROCEDURE — 77063 BREAST TOMOSYNTHESIS BI: CPT

## 2024-07-30 PROCEDURE — 77080 DXA BONE DENSITY AXIAL: CPT

## 2024-07-30 PROCEDURE — 77067 SCR MAMMO BI INCL CAD: CPT

## 2024-07-30 RX ORDER — EMPAGLIFLOZIN 10 MG/1
10 TABLET, FILM COATED ORAL DAILY
Qty: 30 TABLET | Refills: 0 | Status: SHIPPED | OUTPATIENT
Start: 2024-07-30

## 2024-08-21 DIAGNOSIS — I50.32 CHRONIC DIASTOLIC CONGESTIVE HEART FAILURE: ICD-10-CM

## 2024-08-21 RX ORDER — SPIRONOLACTONE 50 MG/1
50 TABLET, FILM COATED ORAL DAILY
Qty: 90 TABLET | Refills: 0 | Status: SHIPPED | OUTPATIENT
Start: 2024-08-21

## 2024-08-22 DIAGNOSIS — I50.32 CHRONIC DIASTOLIC CONGESTIVE HEART FAILURE: ICD-10-CM

## 2024-08-22 RX ORDER — EMPAGLIFLOZIN 10 MG/1
10 TABLET, FILM COATED ORAL DAILY
Qty: 90 TABLET | Refills: 0 | Status: SHIPPED | OUTPATIENT
Start: 2024-08-22

## 2024-09-06 DIAGNOSIS — J45.909 ASTHMA, UNSPECIFIED ASTHMA SEVERITY, UNSPECIFIED WHETHER COMPLICATED, UNSPECIFIED WHETHER PERSISTENT: ICD-10-CM

## 2024-09-06 DIAGNOSIS — J44.9 CHRONIC OBSTRUCTIVE PULMONARY DISEASE, UNSPECIFIED COPD TYPE: ICD-10-CM

## 2024-09-09 RX ORDER — ALBUTEROL SULFATE 90 UG/1
AEROSOL, METERED RESPIRATORY (INHALATION)
Qty: 108 G | Refills: 11 | Status: SHIPPED | OUTPATIENT
Start: 2024-09-09

## 2024-09-23 ENCOUNTER — OFFICE VISIT (OUTPATIENT)
Dept: INTERNAL MEDICINE | Facility: CLINIC | Age: 79
End: 2024-09-23
Payer: MEDICARE

## 2024-09-23 VITALS
TEMPERATURE: 97 F | WEIGHT: 126 LBS | BODY MASS INDEX: 20.99 KG/M2 | SYSTOLIC BLOOD PRESSURE: 117 MMHG | HEART RATE: 87 BPM | OXYGEN SATURATION: 95 % | DIASTOLIC BLOOD PRESSURE: 73 MMHG | HEIGHT: 65 IN

## 2024-09-23 DIAGNOSIS — I25.118 CORONARY ARTERY DISEASE OF NATIVE ARTERY OF NATIVE HEART WITH STABLE ANGINA PECTORIS: ICD-10-CM

## 2024-09-23 DIAGNOSIS — Z29.11 NEED FOR RSV VACCINATION: ICD-10-CM

## 2024-09-23 DIAGNOSIS — E78.2 MIXED HYPERLIPIDEMIA: ICD-10-CM

## 2024-09-23 DIAGNOSIS — J44.9 CHRONIC OBSTRUCTIVE PULMONARY DISEASE, UNSPECIFIED COPD TYPE: ICD-10-CM

## 2024-09-23 DIAGNOSIS — Z23 NEED FOR COVID-19 VACCINE: ICD-10-CM

## 2024-09-23 DIAGNOSIS — I10 PRIMARY HYPERTENSION: ICD-10-CM

## 2024-09-23 DIAGNOSIS — I50.32 CHRONIC DIASTOLIC CONGESTIVE HEART FAILURE: ICD-10-CM

## 2024-09-23 DIAGNOSIS — Z23 NEED FOR INFLUENZA VACCINATION: ICD-10-CM

## 2024-09-23 DIAGNOSIS — Z00.00 ANNUAL PHYSICAL EXAM: Primary | ICD-10-CM

## 2024-09-23 LAB
ALBUMIN SERPL-MCNC: 4.5 G/DL (ref 3.5–5.2)
ALBUMIN/GLOB SERPL: 1.7 G/DL
ALP SERPL-CCNC: 71 U/L (ref 39–117)
ALT SERPL W P-5'-P-CCNC: 30 U/L (ref 1–33)
ANION GAP SERPL CALCULATED.3IONS-SCNC: 13 MMOL/L (ref 5–15)
AST SERPL-CCNC: 45 U/L (ref 1–32)
BASOPHILS # BLD AUTO: 0.05 10*3/MM3 (ref 0–0.2)
BASOPHILS NFR BLD AUTO: 0.8 % (ref 0–1.5)
BILIRUB SERPL-MCNC: 0.6 MG/DL (ref 0–1.2)
BUN SERPL-MCNC: 24 MG/DL (ref 8–23)
BUN/CREAT SERPL: 27.9 (ref 7–25)
CALCIUM SPEC-SCNC: 10 MG/DL (ref 8.6–10.5)
CHLORIDE SERPL-SCNC: 100 MMOL/L (ref 98–107)
CHOLEST SERPL-MCNC: 185 MG/DL (ref 0–200)
CO2 SERPL-SCNC: 23 MMOL/L (ref 22–29)
CREAT SERPL-MCNC: 0.86 MG/DL (ref 0.57–1)
DEPRECATED RDW RBC AUTO: 51 FL (ref 37–54)
EGFRCR SERPLBLD CKD-EPI 2021: 69.2 ML/MIN/1.73
EOSINOPHIL # BLD AUTO: 0.09 10*3/MM3 (ref 0–0.4)
EOSINOPHIL NFR BLD AUTO: 1.5 % (ref 0.3–6.2)
ERYTHROCYTE [DISTWIDTH] IN BLOOD BY AUTOMATED COUNT: 13.2 % (ref 12.3–15.4)
GLOBULIN UR ELPH-MCNC: 2.7 GM/DL
GLUCOSE SERPL-MCNC: 78 MG/DL (ref 65–99)
HCT VFR BLD AUTO: 43.1 % (ref 34–46.6)
HDLC SERPL-MCNC: 94 MG/DL (ref 40–60)
HGB BLD-MCNC: 14.3 G/DL (ref 12–15.9)
IMM GRANULOCYTES # BLD AUTO: 0.02 10*3/MM3 (ref 0–0.05)
IMM GRANULOCYTES NFR BLD AUTO: 0.3 % (ref 0–0.5)
LDLC SERPL CALC-MCNC: 77 MG/DL (ref 0–100)
LDLC/HDLC SERPL: 0.8 {RATIO}
LYMPHOCYTES # BLD AUTO: 1.06 10*3/MM3 (ref 0.7–3.1)
LYMPHOCYTES NFR BLD AUTO: 17.3 % (ref 19.6–45.3)
MCH RBC QN AUTO: 34.5 PG (ref 26.6–33)
MCHC RBC AUTO-ENTMCNC: 33.2 G/DL (ref 31.5–35.7)
MCV RBC AUTO: 104.1 FL (ref 79–97)
MONOCYTES # BLD AUTO: 0.77 10*3/MM3 (ref 0.1–0.9)
MONOCYTES NFR BLD AUTO: 12.5 % (ref 5–12)
NEUTROPHILS NFR BLD AUTO: 4.15 10*3/MM3 (ref 1.7–7)
NEUTROPHILS NFR BLD AUTO: 67.6 % (ref 42.7–76)
NRBC BLD AUTO-RTO: 0 /100 WBC (ref 0–0.2)
NT-PROBNP SERPL-MCNC: 237 PG/ML (ref 0–1800)
PLATELET # BLD AUTO: 222 10*3/MM3 (ref 140–450)
PMV BLD AUTO: 9.4 FL (ref 6–12)
POTASSIUM SERPL-SCNC: 4.3 MMOL/L (ref 3.5–5.2)
PROT SERPL-MCNC: 7.2 G/DL (ref 6–8.5)
RBC # BLD AUTO: 4.14 10*6/MM3 (ref 3.77–5.28)
SODIUM SERPL-SCNC: 136 MMOL/L (ref 136–145)
TRIGL SERPL-MCNC: 80 MG/DL (ref 0–150)
VLDLC SERPL-MCNC: 14 MG/DL (ref 5–40)
WBC NRBC COR # BLD AUTO: 6.14 10*3/MM3 (ref 3.4–10.8)

## 2024-09-23 PROCEDURE — 3078F DIAST BP <80 MM HG: CPT | Performed by: INTERNAL MEDICINE

## 2024-09-23 PROCEDURE — 80061 LIPID PANEL: CPT | Performed by: INTERNAL MEDICINE

## 2024-09-23 PROCEDURE — 3074F SYST BP LT 130 MM HG: CPT | Performed by: INTERNAL MEDICINE

## 2024-09-23 PROCEDURE — 1170F FXNL STATUS ASSESSED: CPT | Performed by: INTERNAL MEDICINE

## 2024-09-23 PROCEDURE — 85025 COMPLETE CBC W/AUTO DIFF WBC: CPT | Performed by: INTERNAL MEDICINE

## 2024-09-23 PROCEDURE — G0439 PPPS, SUBSEQ VISIT: HCPCS | Performed by: INTERNAL MEDICINE

## 2024-09-23 PROCEDURE — 83880 ASSAY OF NATRIURETIC PEPTIDE: CPT | Performed by: INTERNAL MEDICINE

## 2024-09-23 PROCEDURE — 80053 COMPREHEN METABOLIC PANEL: CPT | Performed by: INTERNAL MEDICINE

## 2024-09-23 PROCEDURE — 1126F AMNT PAIN NOTED NONE PRSNT: CPT | Performed by: INTERNAL MEDICINE

## 2024-10-13 ENCOUNTER — HOSPITAL ENCOUNTER (INPATIENT)
Facility: HOSPITAL | Age: 79
LOS: 2 days | Discharge: HOME | End: 2024-10-15
Attending: EMERGENCY MEDICINE | Admitting: FAMILY MEDICINE
Payer: MEDICARE

## 2024-10-13 ENCOUNTER — APPOINTMENT (OUTPATIENT)
Dept: GENERAL RADIOLOGY | Facility: HOSPITAL | Age: 79
End: 2024-10-13
Payer: MEDICARE

## 2024-10-13 DIAGNOSIS — J44.1 COPD WITH ACUTE EXACERBATION: Primary | ICD-10-CM

## 2024-10-13 DIAGNOSIS — E78.49 OTHER HYPERLIPIDEMIA: ICD-10-CM

## 2024-10-13 DIAGNOSIS — R09.02 HYPOXIA: ICD-10-CM

## 2024-10-13 PROBLEM — N39.0 UTI (URINARY TRACT INFECTION), BACTERIAL: Status: ACTIVE | Noted: 2024-10-13

## 2024-10-13 PROBLEM — J96.21 ACUTE ON CHRONIC RESPIRATORY FAILURE WITH HYPOXIA: Status: ACTIVE | Noted: 2024-10-13

## 2024-10-13 PROBLEM — A49.9 UTI (URINARY TRACT INFECTION), BACTERIAL: Status: ACTIVE | Noted: 2024-10-13

## 2024-10-13 LAB
ALBUMIN SERPL-MCNC: 3.7 G/DL (ref 3.5–5.2)
ALBUMIN/GLOB SERPL: 1 G/DL
ALP SERPL-CCNC: 186 U/L (ref 39–117)
ALT SERPL W P-5'-P-CCNC: 81 U/L (ref 1–33)
ANION GAP SERPL CALCULATED.3IONS-SCNC: 12.7 MMOL/L (ref 5–15)
ANION GAP SERPL CALCULATED.3IONS-SCNC: 15.4 MMOL/L (ref 5–15)
ARTERIAL PATENCY WRIST A: POSITIVE
AST SERPL-CCNC: 171 U/L (ref 1–32)
ATMOSPHERIC PRESS: 740 MMHG
BACTERIA UR QL AUTO: ABNORMAL /HPF
BASE EXCESS BLDA CALC-SCNC: -0.9 MMOL/L (ref -2–2)
BASOPHILS # BLD AUTO: 0.05 10*3/MM3 (ref 0–0.2)
BASOPHILS NFR BLD AUTO: 0.5 % (ref 0–1.5)
BDY SITE: ABNORMAL
BILIRUB SERPL-MCNC: 0.8 MG/DL (ref 0–1.2)
BILIRUB UR QL STRIP: NEGATIVE
BUN SERPL-MCNC: 23 MG/DL (ref 8–23)
BUN SERPL-MCNC: 25 MG/DL (ref 8–23)
BUN/CREAT SERPL: 15.5 (ref 7–25)
BUN/CREAT SERPL: 15.6 (ref 7–25)
CA-I BLDA-SCNC: 1.12 MMOL/L (ref 1.13–1.32)
CALCIUM SPEC-SCNC: 8.6 MG/DL (ref 8.6–10.5)
CALCIUM SPEC-SCNC: 8.6 MG/DL (ref 8.6–10.5)
CHLORIDE BLDA-SCNC: 102 MMOL/L (ref 98–107)
CHLORIDE SERPL-SCNC: 102 MMOL/L (ref 98–107)
CHLORIDE SERPL-SCNC: 98 MMOL/L (ref 98–107)
CLARITY UR: ABNORMAL
CO2 SERPL-SCNC: 18.6 MMOL/L (ref 22–29)
CO2 SERPL-SCNC: 24.3 MMOL/L (ref 22–29)
COLOR UR: YELLOW
CREAT SERPL-MCNC: 1.47 MG/DL (ref 0.57–1)
CREAT SERPL-MCNC: 1.61 MG/DL (ref 0.57–1)
D DIMER PPP FEU-MCNC: 3.5 MCGFEU/ML (ref 0–0.78)
D-LACTATE SERPL-SCNC: 0.7 MMOL/L
D-LACTATE SERPL-SCNC: 1.1 MMOL/L (ref 0.5–2)
DEPRECATED RDW RBC AUTO: 50.3 FL (ref 37–54)
EGFRCR SERPLBLD CKD-EPI 2021: 32.6 ML/MIN/1.73
EGFRCR SERPLBLD CKD-EPI 2021: 36.4 ML/MIN/1.73
EOSINOPHIL # BLD AUTO: 0.02 10*3/MM3 (ref 0–0.4)
EOSINOPHIL NFR BLD AUTO: 0.2 % (ref 0.3–6.2)
ERYTHROCYTE [DISTWIDTH] IN BLOOD BY AUTOMATED COUNT: 13.4 % (ref 12.3–15.4)
FLUAV SUBTYP SPEC NAA+PROBE: NOT DETECTED
FLUBV RNA ISLT QL NAA+PROBE: NOT DETECTED
GAS FLOW AIRWAY: 2 LPM
GLOBULIN UR ELPH-MCNC: 3.6 GM/DL
GLUCOSE BLDC GLUCOMTR-MCNC: 115 MG/DL (ref 70–99)
GLUCOSE BLDC GLUCOMTR-MCNC: 129 MG/DL (ref 65–99)
GLUCOSE SERPL-MCNC: 113 MG/DL (ref 65–99)
GLUCOSE SERPL-MCNC: 185 MG/DL (ref 65–99)
GLUCOSE UR STRIP-MCNC: ABNORMAL MG/DL
HCO3 BLDA-SCNC: 22.8 MMOL/L (ref 22–26)
HCT VFR BLD AUTO: 38.6 % (ref 34–46.6)
HCT VFR BLD CALC: 39 % (ref 38–51)
HEMODILUTION: NO
HGB BLD-MCNC: 13.1 G/DL (ref 12–15.9)
HGB BLDA-MCNC: 13.4 G/DL (ref 12–18)
HGB UR QL STRIP.AUTO: ABNORMAL
HOLD SPECIMEN: NORMAL
HOLD SPECIMEN: NORMAL
HYALINE CASTS UR QL AUTO: ABNORMAL /LPF
IMM GRANULOCYTES # BLD AUTO: 0.04 10*3/MM3 (ref 0–0.05)
IMM GRANULOCYTES NFR BLD AUTO: 0.4 % (ref 0–0.5)
KETONES UR QL STRIP: ABNORMAL
LEUKOCYTE ESTERASE UR QL STRIP.AUTO: ABNORMAL
LYMPHOCYTES # BLD AUTO: 0.43 10*3/MM3 (ref 0.7–3.1)
LYMPHOCYTES NFR BLD AUTO: 4 % (ref 19.6–45.3)
MAGNESIUM SERPL-MCNC: 2.1 MG/DL (ref 1.6–2.4)
MCH RBC QN AUTO: 34.3 PG (ref 26.6–33)
MCHC RBC AUTO-ENTMCNC: 33.9 G/DL (ref 31.5–35.7)
MCV RBC AUTO: 101 FL (ref 79–97)
MODALITY: ABNORMAL
MONOCYTES # BLD AUTO: 1.36 10*3/MM3 (ref 0.1–0.9)
MONOCYTES NFR BLD AUTO: 12.7 % (ref 5–12)
NEUTROPHILS NFR BLD AUTO: 8.77 10*3/MM3 (ref 1.7–7)
NEUTROPHILS NFR BLD AUTO: 82.2 % (ref 42.7–76)
NITRITE UR QL STRIP: NEGATIVE
NRBC BLD AUTO-RTO: 0 /100 WBC (ref 0–0.2)
PCO2 BLDA: 34.1 MM HG (ref 35–45)
PH BLDA: 7.43 PH UNITS (ref 7.35–7.45)
PH UR STRIP.AUTO: 5.5 [PH] (ref 5–8)
PLATELET # BLD AUTO: 187 10*3/MM3 (ref 140–450)
PMV BLD AUTO: 8.8 FL (ref 6–12)
PO2 BLDA: 61.4 MM HG (ref 80–100)
POTASSIUM BLDA-SCNC: 4 MMOL/L (ref 3.5–5)
POTASSIUM SERPL-SCNC: 4.1 MMOL/L (ref 3.5–5.2)
POTASSIUM SERPL-SCNC: 4.2 MMOL/L (ref 3.5–5.2)
PROT SERPL-MCNC: 7.3 G/DL (ref 6–8.5)
PROT UR QL STRIP: ABNORMAL
QT INTERVAL: 304 MS
QT INTERVAL: 368 MS
QTC INTERVAL: 422 MS
QTC INTERVAL: 452 MS
RBC # BLD AUTO: 3.82 10*6/MM3 (ref 3.77–5.28)
RBC # UR STRIP: ABNORMAL /HPF
REF LAB TEST METHOD: ABNORMAL
RSV RNA NPH QL NAA+NON-PROBE: NOT DETECTED
SAO2 % BLDCOA: 92.1 % (ref 95–99)
SARS-COV-2 RNA RESP QL NAA+PROBE: NOT DETECTED
SODIUM BLD-SCNC: 135 MMOL/L (ref 131–143)
SODIUM SERPL-SCNC: 135 MMOL/L (ref 136–145)
SODIUM SERPL-SCNC: 136 MMOL/L (ref 136–145)
SP GR UR STRIP: 1.02 (ref 1–1.03)
SQUAMOUS #/AREA URNS HPF: ABNORMAL /HPF
TROPONIN T SERPL HS-MCNC: 15 NG/L
TROPONIN T SERPL HS-MCNC: 20 NG/L
UROBILINOGEN UR QL STRIP: ABNORMAL
WBC # UR STRIP: ABNORMAL /HPF
WBC NRBC COR # BLD AUTO: 10.67 10*3/MM3 (ref 3.4–10.8)
WHOLE BLOOD HOLD COAG: NORMAL
WHOLE BLOOD HOLD SPECIMEN: NORMAL

## 2024-10-13 PROCEDURE — 36600 WITHDRAWAL OF ARTERIAL BLOOD: CPT

## 2024-10-13 PROCEDURE — 25010000002 METHYLPREDNISOLONE PER 125 MG: Performed by: EMERGENCY MEDICINE

## 2024-10-13 PROCEDURE — 94761 N-INVAS EAR/PLS OXIMETRY MLT: CPT

## 2024-10-13 PROCEDURE — 25810000003 SODIUM CHLORIDE 0.9 % SOLUTION: Performed by: FAMILY MEDICINE

## 2024-10-13 PROCEDURE — 93005 ELECTROCARDIOGRAM TRACING: CPT | Performed by: FAMILY MEDICINE

## 2024-10-13 PROCEDURE — 87899 AGENT NOS ASSAY W/OPTIC: CPT | Performed by: INTERNAL MEDICINE

## 2024-10-13 PROCEDURE — 36415 COLL VENOUS BLD VENIPUNCTURE: CPT

## 2024-10-13 PROCEDURE — 99223 1ST HOSP IP/OBS HIGH 75: CPT | Performed by: FAMILY MEDICINE

## 2024-10-13 PROCEDURE — 84484 ASSAY OF TROPONIN QUANT: CPT | Performed by: FAMILY MEDICINE

## 2024-10-13 PROCEDURE — 25010000002 ENOXAPARIN PER 10 MG: Performed by: FAMILY MEDICINE

## 2024-10-13 PROCEDURE — 93005 ELECTROCARDIOGRAM TRACING: CPT | Performed by: EMERGENCY MEDICINE

## 2024-10-13 PROCEDURE — 81001 URINALYSIS AUTO W/SCOPE: CPT | Performed by: EMERGENCY MEDICINE

## 2024-10-13 PROCEDURE — 85379 FIBRIN DEGRADATION QUANT: CPT | Performed by: FAMILY MEDICINE

## 2024-10-13 PROCEDURE — 83735 ASSAY OF MAGNESIUM: CPT | Performed by: EMERGENCY MEDICINE

## 2024-10-13 PROCEDURE — 84484 ASSAY OF TROPONIN QUANT: CPT | Performed by: EMERGENCY MEDICINE

## 2024-10-13 PROCEDURE — 82948 REAGENT STRIP/BLOOD GLUCOSE: CPT

## 2024-10-13 PROCEDURE — 94799 UNLISTED PULMONARY SVC/PX: CPT

## 2024-10-13 PROCEDURE — 25010000002 CEFTRIAXONE PER 250 MG: Performed by: EMERGENCY MEDICINE

## 2024-10-13 PROCEDURE — 87637 SARSCOV2&INF A&B&RSV AMP PRB: CPT | Performed by: EMERGENCY MEDICINE

## 2024-10-13 PROCEDURE — 94640 AIRWAY INHALATION TREATMENT: CPT

## 2024-10-13 PROCEDURE — 25810000003 LACTATED RINGERS PER 1000 ML: Performed by: FAMILY MEDICINE

## 2024-10-13 PROCEDURE — 80053 COMPREHEN METABOLIC PANEL: CPT | Performed by: EMERGENCY MEDICINE

## 2024-10-13 PROCEDURE — 99285 EMERGENCY DEPT VISIT HI MDM: CPT

## 2024-10-13 PROCEDURE — 87040 BLOOD CULTURE FOR BACTERIA: CPT | Performed by: EMERGENCY MEDICINE

## 2024-10-13 PROCEDURE — 83605 ASSAY OF LACTIC ACID: CPT

## 2024-10-13 PROCEDURE — 85025 COMPLETE CBC W/AUTO DIFF WBC: CPT | Performed by: EMERGENCY MEDICINE

## 2024-10-13 PROCEDURE — 83605 ASSAY OF LACTIC ACID: CPT | Performed by: EMERGENCY MEDICINE

## 2024-10-13 PROCEDURE — 71045 X-RAY EXAM CHEST 1 VIEW: CPT

## 2024-10-13 PROCEDURE — 82803 BLOOD GASES ANY COMBINATION: CPT

## 2024-10-13 PROCEDURE — 87449 NOS EACH ORGANISM AG IA: CPT | Performed by: INTERNAL MEDICINE

## 2024-10-13 PROCEDURE — 82330 ASSAY OF CALCIUM: CPT

## 2024-10-13 PROCEDURE — 93005 ELECTROCARDIOGRAM TRACING: CPT

## 2024-10-13 PROCEDURE — 80051 ELECTROLYTE PANEL: CPT

## 2024-10-13 RX ORDER — ENOXAPARIN SODIUM 100 MG/ML
1 INJECTION SUBCUTANEOUS ONCE
Status: COMPLETED | OUTPATIENT
Start: 2024-10-13 | End: 2024-10-13

## 2024-10-13 RX ORDER — IPRATROPIUM BROMIDE AND ALBUTEROL SULFATE 2.5; .5 MG/3ML; MG/3ML
3 SOLUTION RESPIRATORY (INHALATION) EVERY 4 HOURS PRN
Status: DISCONTINUED | OUTPATIENT
Start: 2024-10-13 | End: 2024-10-14 | Stop reason: SDUPTHER

## 2024-10-13 RX ORDER — POLYETHYLENE GLYCOL 3350 17 G/17G
17 POWDER, FOR SOLUTION ORAL DAILY PRN
Status: DISCONTINUED | OUTPATIENT
Start: 2024-10-13 | End: 2024-10-15 | Stop reason: HOSPADM

## 2024-10-13 RX ORDER — METHYLPREDNISOLONE SODIUM SUCCINATE 125 MG/2ML
125 INJECTION, POWDER, LYOPHILIZED, FOR SOLUTION INTRAMUSCULAR; INTRAVENOUS ONCE
Status: COMPLETED | OUTPATIENT
Start: 2024-10-13 | End: 2024-10-13

## 2024-10-13 RX ORDER — GUAIFENESIN 600 MG/1
600 TABLET, EXTENDED RELEASE ORAL EVERY 12 HOURS SCHEDULED
Status: DISCONTINUED | OUTPATIENT
Start: 2024-10-13 | End: 2024-10-15 | Stop reason: HOSPADM

## 2024-10-13 RX ORDER — GUAIFENESIN/DEXTROMETHORPHAN 100-10MG/5
10 SYRUP ORAL EVERY 6 HOURS PRN
Status: DISCONTINUED | OUTPATIENT
Start: 2024-10-13 | End: 2024-10-15 | Stop reason: HOSPADM

## 2024-10-13 RX ORDER — ROSUVASTATIN CALCIUM 20 MG/1
40 TABLET, COATED ORAL NIGHTLY
Status: DISCONTINUED | OUTPATIENT
Start: 2024-10-14 | End: 2024-10-15 | Stop reason: HOSPADM

## 2024-10-13 RX ORDER — ONDANSETRON 2 MG/ML
4 INJECTION INTRAMUSCULAR; INTRAVENOUS EVERY 6 HOURS PRN
Status: DISCONTINUED | OUTPATIENT
Start: 2024-10-13 | End: 2024-10-15 | Stop reason: HOSPADM

## 2024-10-13 RX ORDER — LOSARTAN POTASSIUM 25 MG/1
25 TABLET ORAL DAILY
Status: DISCONTINUED | OUTPATIENT
Start: 2024-10-14 | End: 2024-10-15 | Stop reason: HOSPADM

## 2024-10-13 RX ORDER — METHYLPREDNISOLONE SODIUM SUCCINATE 40 MG/ML
40 INJECTION, POWDER, LYOPHILIZED, FOR SOLUTION INTRAMUSCULAR; INTRAVENOUS EVERY 12 HOURS
Status: DISCONTINUED | OUTPATIENT
Start: 2024-10-14 | End: 2024-10-15

## 2024-10-13 RX ORDER — BISACODYL 5 MG/1
5 TABLET, DELAYED RELEASE ORAL DAILY PRN
Status: DISCONTINUED | OUTPATIENT
Start: 2024-10-13 | End: 2024-10-15 | Stop reason: HOSPADM

## 2024-10-13 RX ORDER — SODIUM CHLORIDE 0.9 % (FLUSH) 0.9 %
10 SYRINGE (ML) INJECTION AS NEEDED
Status: DISCONTINUED | OUTPATIENT
Start: 2024-10-13 | End: 2024-10-15 | Stop reason: HOSPADM

## 2024-10-13 RX ORDER — IPRATROPIUM BROMIDE AND ALBUTEROL SULFATE 2.5; .5 MG/3ML; MG/3ML
3 SOLUTION RESPIRATORY (INHALATION) ONCE
Status: COMPLETED | OUTPATIENT
Start: 2024-10-13 | End: 2024-10-13

## 2024-10-13 RX ORDER — AMOXICILLIN 250 MG
2 CAPSULE ORAL 2 TIMES DAILY PRN
Status: DISCONTINUED | OUTPATIENT
Start: 2024-10-13 | End: 2024-10-15 | Stop reason: HOSPADM

## 2024-10-13 RX ORDER — IPRATROPIUM BROMIDE AND ALBUTEROL SULFATE 2.5; .5 MG/3ML; MG/3ML
3 SOLUTION RESPIRATORY (INHALATION)
Status: DISCONTINUED | OUTPATIENT
Start: 2024-10-13 | End: 2024-10-14

## 2024-10-13 RX ORDER — PANTOPRAZOLE SODIUM 40 MG/1
40 TABLET, DELAYED RELEASE ORAL
Status: DISCONTINUED | OUTPATIENT
Start: 2024-10-14 | End: 2024-10-15 | Stop reason: HOSPADM

## 2024-10-13 RX ORDER — BISACODYL 10 MG
10 SUPPOSITORY, RECTAL RECTAL DAILY PRN
Status: DISCONTINUED | OUTPATIENT
Start: 2024-10-13 | End: 2024-10-15 | Stop reason: HOSPADM

## 2024-10-13 RX ORDER — AZITHROMYCIN 250 MG/1
500 TABLET, FILM COATED ORAL ONCE
Status: COMPLETED | OUTPATIENT
Start: 2024-10-13 | End: 2024-10-13

## 2024-10-13 RX ORDER — SODIUM CHLORIDE, SODIUM LACTATE, POTASSIUM CHLORIDE, CALCIUM CHLORIDE 600; 310; 30; 20 MG/100ML; MG/100ML; MG/100ML; MG/100ML
75 INJECTION, SOLUTION INTRAVENOUS CONTINUOUS
Status: ACTIVE | OUTPATIENT
Start: 2024-10-13 | End: 2024-10-14

## 2024-10-13 RX ORDER — SODIUM CHLORIDE 0.9 % (FLUSH) 0.9 %
10 SYRINGE (ML) INJECTION EVERY 12 HOURS SCHEDULED
Status: DISCONTINUED | OUTPATIENT
Start: 2024-10-13 | End: 2024-10-15 | Stop reason: HOSPADM

## 2024-10-13 RX ADMIN — SODIUM CHLORIDE, POTASSIUM CHLORIDE, SODIUM LACTATE AND CALCIUM CHLORIDE 75 ML/HR: 600; 310; 30; 20 INJECTION, SOLUTION INTRAVENOUS at 18:24

## 2024-10-13 RX ADMIN — IPRATROPIUM BROMIDE AND ALBUTEROL SULFATE 3 ML: .5; 3 SOLUTION RESPIRATORY (INHALATION) at 15:58

## 2024-10-13 RX ADMIN — SODIUM CHLORIDE 1000 ML: 0.9 INJECTION, SOLUTION INTRAVENOUS at 17:19

## 2024-10-13 RX ADMIN — SODIUM CHLORIDE 1000 MG: 9 INJECTION, SOLUTION INTRAMUSCULAR; INTRAVENOUS; SUBCUTANEOUS at 17:18

## 2024-10-13 RX ADMIN — ENOXAPARIN SODIUM 60 MG: 100 INJECTION SUBCUTANEOUS at 17:18

## 2024-10-13 RX ADMIN — IPRATROPIUM BROMIDE AND ALBUTEROL SULFATE 3 ML: .5; 3 SOLUTION RESPIRATORY (INHALATION) at 19:40

## 2024-10-13 RX ADMIN — METHYLPREDNISOLONE SODIUM SUCCINATE 125 MG: 125 INJECTION, POWDER, FOR SOLUTION INTRAMUSCULAR; INTRAVENOUS at 17:18

## 2024-10-13 RX ADMIN — Medication 10 ML: at 20:02

## 2024-10-13 RX ADMIN — AZITHROMYCIN DIHYDRATE 500 MG: 250 TABLET ORAL at 17:18

## 2024-10-13 RX ADMIN — GUAIFENESIN 600 MG: 600 TABLET ORAL at 20:02

## 2024-10-13 NOTE — ED PROVIDER NOTES
"Time: 1:52 PM EDT  Date of encounter:  10/13/2024  Independent Historian/Clinical History and Information was obtained by:   Patient    History is limited by: N/A    Chief Complaint: Shortness of breath, fevers chills and confusion      History of Present Illness:  Patient is a 79 y.o. year old female who presents to the emergency department for evaluation of 3-day history of difficulty breathing with fevers chills, productive cough.  Family states today she called to check on her and she seemed mildly confused.  Patient complains of right sided \"rib pain\" worse with movement and coughing.  Patient states she feels like she is fighting a respiratory infection.      Patient Care Team  Primary Care Provider: Sam Hampton Jr., MD    Past Medical History:     Allergies   Allergen Reactions    Sulfamethoxazole-Trimethoprim Rash     Past Medical History:   Diagnosis Date    COPD (chronic obstructive pulmonary disease)     History of percutaneous coronary intervention     Lung nodule      Past Surgical History:   Procedure Laterality Date    BREAST BIOPSY Right     CARDIAC CATHETERIZATION N/A 01/11/2023    FINGER SURGERY      trigger thumb    FOOT SURGERY      GALLBLADDER SURGERY      gallstones pt is unsure if they removed galbladder    HYSTERECTOMY      LUNG BIOPSY       Family History   Problem Relation Age of Onset    Cancer Mother     Diabetes Mother     Heart failure Mother     Cancer Brother        Home Medications:  Prior to Admission medications    Medication Sig Start Date End Date Taking? Authorizing Provider   albuterol sulfate  (90 Base) MCG/ACT inhaler USE 2 INHALATIONS BY MOUTH EVERY 4 HOURS AS NEEDED FOR WHEEZING  OR SHORTNESS OF AIR 9/9/24   Mathew Mcgovern MD   Aspirin Low Dose 81 MG EC tablet Take 1 tablet by mouth Daily. 9/23/22   ProviderLamonte MD   Cetirizine HCl 10 MG capsule Take  by mouth.    ProviderLamonte MD   Diclofenac Sodium (VOLTAREN) 1 % gel gel Apply 4 g " topically to the appropriate area as directed 4 (Four) Times a Day As Needed (pain). 24   Sam Hampton Jr., MD   empagliflozin (Jardiance) 10 MG tablet tablet Take 1 tablet by mouth Daily. 24   Sam Hampton Jr., MD   esomeprazole (nexIUM) 40 MG capsule TAKE 1 CAPSULE BY MOUTH IN THE  MORNING BEFORE BREAKFAST 3/8/24   Sam Hampton Jr., MD   furosemide (LASIX) 40 MG tablet Take 1 tablet by mouth Daily.  Patient not taking: Reported on 2024   ProviderLamonte MD   losartan (COZAAR) 25 MG tablet Take 1 tablet by mouth Daily. 24  Lamonte Reed MD   magnesium 30 MG tablet Take 1 tablet by mouth 2 (Two) Times a Day.    ProviderLamonte MD   ondansetron (ZOFRAN) 4 MG tablet TAKE 1 TABLET BY MOUTH EVERY 8  HOURS AS NEEDED NAUSEA AND  VOMITING 23   Sam Hampton Jr., MD   rosuvastatin (CRESTOR) 40 MG tablet TAKE 1 TABLET BY MOUTH EVERY  NIGHT AT BEDTIME 24   Sam Hampton Jr., MD   spironolactone (ALDACTONE) 50 MG tablet TAKE 1 TABLET BY MOUTH DAILY 24   Sam Hampton Jr., MD   Trelebrooklyn Ellipta 100-62.5-25 MCG/ACT inhaler USE 1 INHALATION BY MOUTH ONCE  DAILY AT THE SAME TIME EACH DAY 24   Mathew Mcgovern MD        Social History:   Social History     Tobacco Use    Smoking status: Former     Current packs/day: 0.00     Average packs/day: 2.0 packs/day for 34.0 years (68.0 ttl pk-yrs)     Types: Cigarettes     Start date:      Quit date: 1990     Years since quittin.7     Passive exposure: Past    Smokeless tobacco: Never   Vaping Use    Vaping status: Never Used   Substance Use Topics    Alcohol use: Yes     Alcohol/week: 2.0 standard drinks of alcohol     Types: 2 Shots of liquor per week    Drug use: Defer         Review of Systems:  Review of Systems   Constitutional:  Positive for chills and fever.   HENT:  Negative for congestion, rhinorrhea and sore throat.    Eyes:  Negative  "for photophobia.   Respiratory:  Positive for cough and shortness of breath. Negative for apnea and chest tightness.    Cardiovascular:  Negative for chest pain and palpitations.   Gastrointestinal:  Negative for abdominal pain, diarrhea, nausea and vomiting.   Endocrine: Negative.    Genitourinary:  Negative for difficulty urinating and dysuria.   Musculoskeletal:  Negative for back pain, joint swelling and myalgias.   Skin:  Negative for color change and wound.   Allergic/Immunologic: Negative.    Neurological:  Negative for seizures and headaches.   Psychiatric/Behavioral:  Positive for confusion.    All other systems reviewed and are negative.       Physical Exam:  /66   Pulse 119   Temp 99.1 °F (37.3 °C) (Oral)   Resp 22   Ht 162.6 cm (64\")   Wt 58.8 kg (129 lb 9.6 oz)   SpO2 91%   BMI 22.25 kg/m²     Physical Exam  Vitals and nursing note reviewed.   Constitutional:       General: She is awake.      Appearance: Normal appearance. She is well-developed.   HENT:      Head: Normocephalic and atraumatic.      Nose: Nose normal.      Mouth/Throat:      Comments: Dry mucous membranes  Eyes:      Extraocular Movements: Extraocular movements intact.      Pupils: Pupils are equal, round, and reactive to light.   Cardiovascular:      Rate and Rhythm: Regular rhythm. Tachycardia present.      Heart sounds: Normal heart sounds.   Pulmonary:      Effort: Pulmonary effort is normal. No respiratory distress.      Breath sounds: No wheezing, rhonchi or rales.      Comments: Bibasilar crackles, mildly diminished  Abdominal:      General: Bowel sounds are normal.      Palpations: Abdomen is soft.      Tenderness: There is no abdominal tenderness. There is no guarding or rebound.      Comments: No rigidity   Musculoskeletal:         General: No tenderness. Normal range of motion.      Cervical back: Normal range of motion and neck supple.   Skin:     General: Skin is warm and dry.      Coloration: Skin is not " jaundiced.   Neurological:      General: No focal deficit present.      Mental Status: She is alert and oriented to person, place, and time. Mental status is at baseline.      GCS: GCS eye subscore is 4. GCS verbal subscore is 5. GCS motor subscore is 6.      Cranial Nerves: No cranial nerve deficit.      Comments: Patient has no difficulty answering questions.  I cannot appreciate any obvious confusion.   Psychiatric:         Mood and Affect: Mood normal.                  Procedures:  Procedures      Medical Decision Making:      Comorbidities that affect care:    COPD, lung nodule    External Notes reviewed:    Previous Clinic Note: Internal medicine office visit 9/23/2024.  Description: Annual physical exam, COPD, chronic diastolic CHF      The following orders were placed and all results were independently analyzed by me:  Orders Placed This Encounter   Procedures    COVID-19, FLU A/B, RSV PCR 1 HR TAT - Swab, Nasopharynx    Blood Culture - Blood,    Blood Culture - Blood,    XR Chest 1 View    [place] Draw    Comprehensive Metabolic Panel    Single High Sensitivity Troponin T    Magnesium    Urinalysis With Microscopic If Indicated (No Culture) - Urine, Clean Catch    CBC Auto Differential    Lactic Acid, Plasma    Arterial Blood Gas + Electrolytes    Blood Gas, Arterial -    D-dimer, Quantitative    NPO Diet NPO Type: Strict NPO    Undress & Gown    Continuous Pulse Oximetry    Vital Signs    Orthostatic Blood Pressure    Hospitalist (on-call MD unless specified)    Oxygen Therapy- Nasal Cannula; Titrate 1-6 LPM Per SpO2; 90 - 95%    RT to Initiate Bronchopulmonary Hygiene Protocol    POC Glucose Once    POC Glucose Once    POC Lactate    POC Electrolyte Panel    ECG 12 Lead ED Triage Standing Order; Weak / Dizzy / AMS    Insert Peripheral IV    Fall Precautions    CBC & Differential    Green Top (Gel)    Lavender Top    Gold Top - SST    Light Blue Top       Medications Given in the Emergency  Department:  Medications   sodium chloride 0.9 % flush 10 mL (has no administration in time range)   cefTRIAXone (ROCEPHIN) in NS 1 gram/10ml IV PUSH syringe (has no administration in time range)   azithromycin (ZITHROMAX) tablet 500 mg (has no administration in time range)   methylPREDNISolone sodium succinate (SOLU-Medrol) injection 125 mg (has no administration in time range)   ipratropium-albuterol (DUO-NEB) nebulizer solution 3 mL (3 mL Nebulization Given 10/13/24 1558)        ED Course:    ED Course as of 10/13/24 1602   Sun Oct 13, 2024   1339 EKG interpretation: Normal sinus tachycardia, heart rate 115.  Borderline ST depression inferior/lateral leads [RP]      ED Course User Index  [RP] Marcelo Sam MD       Labs:    Lab Results (last 24 hours)       Procedure Component Value Units Date/Time    POC Glucose Once [[id number]]  (Abnormal) Collected: 10/13/24 1305    Specimen: Blood Updated: 10/13/24 1307     Glucose 115 mg/dL      Comment: Serial Number: [id number]:  [id number]       COVID-19, FLU A/B, RSV PCR 1 HR TAT - Swab, Nasopharynx [[id number]]  (Normal) Collected: 10/13/24 1346    Specimen: Swab from Nasopharynx Updated: 10/13/24 1452     COVID19 Not Detected     Influenza A PCR Not Detected     Influenza B PCR Not Detected     RSV, PCR Not Detected    Narrative:      Fact sheet for providers: https://www.fda.gov/media/893970/download    Fact sheet for patients: https://www.fda.gov/media/353218/download    Test performed by PCR.    CBC & Differential [[id number]]  (Abnormal) Collected: 10/13/24 1353    Specimen: Blood Updated: 10/13/24 1359    Narrative:      The following orders were created for panel order CBC & Differential.  Procedure                               Abnormality         Status                     ---------                               -----------         ------                     CBC Auto Differential[[id number]]        Abnormal            Final result                 Please  view results for these tests on the individual orders.    Comprehensive Metabolic Panel [[id number]]  (Abnormal) Collected: 10/13/24 1353    Specimen: Blood Updated: 10/13/24 1419     Glucose 113 mg/dL      BUN 25 mg/dL      Creatinine 1.61 mg/dL      Sodium 135 mmol/L      Potassium 4.1 mmol/L      Chloride 98 mmol/L      CO2 24.3 mmol/L      Calcium 8.6 mg/dL      Total Protein 7.3 g/dL      Albumin 3.7 g/dL      ALT (SGPT) 81 U/L      AST (SGOT) 171 U/L      Alkaline Phosphatase 186 U/L      Total Bilirubin 0.8 mg/dL      Globulin 3.6 gm/dL      A/G Ratio 1.0 g/dL      BUN/Creatinine Ratio 15.5     Anion Gap 12.7 mmol/L      eGFR 32.6 mL/min/1.73     Narrative:      GFR Normal >60  Chronic Kidney Disease <60  Kidney Failure <15    The GFR formula is only valid for adults with stable renal function between ages 18 and 70.    Single High Sensitivity Troponin T [[id number]]  (Abnormal) Collected: 10/13/24 1353    Specimen: Blood Updated: 10/13/24 1419     HS Troponin T 20 ng/L     Narrative:      High Sensitive Troponin T Reference Range:  <14.0 ng/L- Negative Female for AMI  <22.0 ng/L- Negative Male for AMI  >=14 - Abnormal Female indicating possible myocardial injury.  >=22 - Abnormal Male indicating possible myocardial injury.   Clinicians would have to utilize clinical acumen, EKG, Troponin, and serial changes to determine if it is an Acute Myocardial Infarction or myocardial injury due to an underlying chronic condition.         Magnesium [[id number]]  (Normal) Collected: 10/13/24 1353    Specimen: Blood Updated: 10/13/24 1419     Magnesium 2.1 mg/dL     CBC Auto Differential [[id number]]  (Abnormal) Collected: 10/13/24 1353    Specimen: Blood Updated: 10/13/24 1359     WBC 10.67 10*3/mm3      RBC 3.82 10*6/mm3      Hemoglobin 13.1 g/dL      Hematocrit 38.6 %      .0 fL      MCH 34.3 pg      MCHC 33.9 g/dL      RDW 13.4 %      RDW-SD 50.3 fl      MPV 8.8 fL      Platelets 187 10*3/mm3      Neutrophil % 82.2  %      Lymphocyte % 4.0 %      Monocyte % 12.7 %      Eosinophil % 0.2 %      Basophil % 0.5 %      Immature Grans % 0.4 %      Neutrophils, Absolute 8.77 10*3/mm3      Lymphocytes, Absolute 0.43 10*3/mm3      Monocytes, Absolute 1.36 10*3/mm3      Eosinophils, Absolute 0.02 10*3/mm3      Basophils, Absolute 0.05 10*3/mm3      Immature Grans, Absolute 0.04 10*3/mm3      nRBC 0.0 /100 WBC     Blood Culture - Blood, Arm, Left [[id number]] Collected: 10/13/24 1353    Specimen: Blood from Arm, Left Updated: 10/13/24 1357    Lactic Acid, Plasma [[id number]]  (Normal) Collected: 10/13/24 1353    Specimen: Blood Updated: 10/13/24 1416     Lactate 1.1 mmol/L     POC Glucose Once [[id number]]  (Abnormal) Collected: 10/13/24 1402    Specimen: Arterial Blood Updated: 10/13/24 1405     Glucose 129 mg/dL      Comment: Serial Number: [id number]:  [id number]       Blood Gas, Arterial - [[id number]]  (Abnormal) Collected: 10/13/24 1402    Specimen: Arterial Blood Updated: 10/13/24 1405     Site Right Radial     Ammon's Test Positive     pH, Arterial 7.433 pH units      pCO2, Arterial 34.1 mm Hg      pO2, Arterial 61.4 mm Hg      HCO3, Arterial 22.8 mmol/L      Base Excess, Arterial -0.9 mmol/L      Comment: Serial Number: [id number]:  [id number]        O2 Saturation, Arterial 92.1 %      Hemoglobin, Blood Gas 13.4 g/dL      Hematocrit, Blood Gas 39.0 %      Barometric Pressure for Blood Gas 740.0000 mmHg      Modality Cannula     Flow Rate 2.0000 lpm      Hemodilution No    POC Lactate [[id number]]  (Normal) Collected: 10/13/24 1402    Specimen: Arterial Blood Updated: 10/13/24 1405     Lactate 0.7 mmol/L      Comment: Serial Number: [id number]:  [id number]       POC Electrolyte Panel [[id number]]  (Abnormal) Collected: 10/13/24 1402    Specimen: Arterial Blood Updated: 10/13/24 1405     Sodium 135 mmol/L      POC Potassium 4.0 mmol/L      Chloride 102 mmol/L      Ionized Calcium 1.12 mmol/L      Comment: Serial Number: [id number]:   [id number]       Blood Culture - Blood, Arm, Left [[id number]] Collected: 10/13/24 1405    Specimen: Blood from Arm, Left Updated: 10/13/24 1411             Imaging:    XR Chest 1 View    Result Date: 10/13/2024  XR CHEST 1 VW Date of Exam: 10/13/2024 1:33 PM EDT Indication: Weakness, dizziness, altered mental status. Comparison: 7/5/2023. Findings: The heart size is normal. The pulmonary vascular markings are normal. There are underlying emphysematous changes. There are sutures in the right apex. There are basilar linear densities likely due to subsegmental atelectasis. There is no pleural effusion  or pneumothorax. There are chronic age-related changes involving the bony thorax and thoracic aorta.     Impression: 1. Emphysema. 2. Basilar densities likely due to subsegmental atelectasis. Electronically Signed: Gunnar Castro MD  10/13/2024 2:28 PM EDT  Workstation ID: [id number]       Differential Diagnosis and Discussion:    Dyspnea: Differential diagnosis includes but is not limited to metabolic acidosis, neurological disorders, psychogenic, asthma, pneumothorax, upper airway obstruction, COPD, pneumonia, noncardiogenic pulmonary edema, interstitial lung disease, anemia, congestive heart failure, and pulmonary embolism    All labs were reviewed and interpreted by me.  All X-rays impressions were independently interpreted by me.  EKG was interpreted by me.    MDM                     Patient Care Considerations:    CT CHEST: I considered ordering a CT scan of the chest, however patient has acute renal failure with a GFR of 32.  Plan will be for rehydration with CT in the morning.      Consultants/Shared Management Plan:    Hospitalist: I have discussed the case with Dr. Bearden who agrees to accept the patient for admission.  He is aware of the concern for PE and plans to add D-dimer, cover with Lovenox and perform CT once kidney function normalizes.  Social Determinants of Health:    Patient is independent, reliable, and  has access to care.       Disposition and Care Coordination:    Admit:   Through independent evaluation of the patient's history, physical, and imperical data, the patient meets criteria for inpatient admission to the hospital.        Final diagnoses:   Hypoxia        ED Disposition       ED Disposition   Decision to Admit    Condition   --    Comment   --               This medical record created using voice recognition software.             Marcelo Sam MD  10/13/24 5930

## 2024-10-13 NOTE — ED NOTES
Pt states she went to [place] for vacation, got back 10/12, N/V, cough and chills started 3 days ago. Daughter states pt was 72% room air at home and confused. Pt is A&Ox4 on arrival on 4L nasal cannula

## 2024-10-13 NOTE — PROGRESS NOTES
Respiratory Therapist Broncho-Pulmonary Hygiene Progress Note      Patient Name:  Lianne Bardales  YOB: 1945    Lianne Bardales meets the qualification for Level 2 of the Bronco-Pulmonary Hygiene Protocol. This was based on my daily patient assessment and includes review of chest x-ray results, cough ability and quality, oxygenation, secretions or risk for secretion development and patient mobility.     Broncho-Pulmonary Hygiene Assessment:    Level of Movement: Actively changing positions-requires assistance  Disoriented/Follows Commands    Breath Sounds: Diminished and/or coarse rhonchi    Cough: Strong, effective and/or frequent    Chest X-Ray: Presence of atelectasis and/or consolidation    Sputum Productions: None or small amount of thin or watery secretions with effective cough    History and Physical: Chronic condition    SpO2 to Oxygen Need: greater than 92% on room air or  less than 3L nasal canula    Current SpO2 is: 92 on 2LNC    Based on this information, I have completed the following interventions: Aerobika with bronchodialtor medication or TID      Electronically signed by Courtney Pantoja RRT, 10/13/24, 7:31 PM EDT.

## 2024-10-14 ENCOUNTER — APPOINTMENT (OUTPATIENT)
Dept: CT IMAGING | Facility: HOSPITAL | Age: 79
End: 2024-10-14
Payer: MEDICARE

## 2024-10-14 LAB
ALBUMIN SERPL-MCNC: 3.3 G/DL (ref 3.5–5.2)
ALBUMIN/GLOB SERPL: 0.9 G/DL
ALP SERPL-CCNC: 196 U/L (ref 39–117)
ALT SERPL W P-5'-P-CCNC: 84 U/L (ref 1–33)
ANION GAP SERPL CALCULATED.3IONS-SCNC: 14.4 MMOL/L (ref 5–15)
ANION GAP SERPL CALCULATED.3IONS-SCNC: 15.7 MMOL/L (ref 5–15)
AST SERPL-CCNC: 147 U/L (ref 1–32)
BILIRUB SERPL-MCNC: 0.4 MG/DL (ref 0–1.2)
BUN SERPL-MCNC: 23 MG/DL (ref 8–23)
BUN SERPL-MCNC: 23 MG/DL (ref 8–23)
BUN/CREAT SERPL: 16 (ref 7–25)
BUN/CREAT SERPL: 18.4 (ref 7–25)
CALCIUM SPEC-SCNC: 8.6 MG/DL (ref 8.6–10.5)
CALCIUM SPEC-SCNC: 8.9 MG/DL (ref 8.6–10.5)
CHLORIDE SERPL-SCNC: 102 MMOL/L (ref 98–107)
CHLORIDE SERPL-SCNC: 99 MMOL/L (ref 98–107)
CO2 SERPL-SCNC: 20.3 MMOL/L (ref 22–29)
CO2 SERPL-SCNC: 21.6 MMOL/L (ref 22–29)
CREAT SERPL-MCNC: 1.25 MG/DL (ref 0.57–1)
CREAT SERPL-MCNC: 1.44 MG/DL (ref 0.57–1)
CREAT UR-MCNC: 30.9 MG/DL
D-LACTATE SERPL-SCNC: 1.6 MMOL/L (ref 0.5–2)
D-LACTATE SERPL-SCNC: 2.7 MMOL/L (ref 0.5–2)
D-LACTATE SERPL-SCNC: 3.5 MMOL/L (ref 0.5–2)
DEPRECATED RDW RBC AUTO: 49.4 FL (ref 37–54)
EGFRCR SERPLBLD CKD-EPI 2021: 37.3 ML/MIN/1.73
EGFRCR SERPLBLD CKD-EPI 2021: 44.2 ML/MIN/1.73
ERYTHROCYTE [DISTWIDTH] IN BLOOD BY AUTOMATED COUNT: 13.3 % (ref 12.3–15.4)
GEN 5 2HR TROPONIN T REFLEX: 14 NG/L
GLOBULIN UR ELPH-MCNC: 3.7 GM/DL
GLUCOSE BLDC GLUCOMTR-MCNC: 193 MG/DL (ref 70–99)
GLUCOSE SERPL-MCNC: 152 MG/DL (ref 65–99)
GLUCOSE SERPL-MCNC: 188 MG/DL (ref 65–99)
HBA1C MFR BLD: 5.9 % (ref 4.8–5.6)
HCT VFR BLD AUTO: 34.3 % (ref 34–46.6)
HGB BLD-MCNC: 11.7 G/DL (ref 12–15.9)
L PNEUMO1 AG UR QL IA: NEGATIVE
MCH RBC QN AUTO: 34.3 PG (ref 26.6–33)
MCHC RBC AUTO-ENTMCNC: 34.1 G/DL (ref 31.5–35.7)
MCV RBC AUTO: 100.6 FL (ref 79–97)
PLATELET # BLD AUTO: 193 10*3/MM3 (ref 140–450)
PMV BLD AUTO: 9 FL (ref 6–12)
POTASSIUM SERPL-SCNC: 4.2 MMOL/L (ref 3.5–5.2)
POTASSIUM SERPL-SCNC: 4.2 MMOL/L (ref 3.5–5.2)
PROCALCITONIN SERPL-MCNC: 0.57 NG/ML (ref 0–0.25)
PROT ?TM UR-MCNC: 46.5 MG/DL
PROT SERPL-MCNC: 7 G/DL (ref 6–8.5)
PROT/CREAT UR: 1.5 MG/G{CREAT}
RBC # BLD AUTO: 3.41 10*6/MM3 (ref 3.77–5.28)
S PNEUM AG SPEC QL LA: NEGATIVE
SODIUM SERPL-SCNC: 135 MMOL/L (ref 136–145)
SODIUM SERPL-SCNC: 138 MMOL/L (ref 136–145)
TROPONIN T DELTA: -1 NG/L
WBC NRBC COR # BLD AUTO: 10.71 10*3/MM3 (ref 3.4–10.8)
WHOLE BLOOD HOLD SPECIMEN: NORMAL

## 2024-10-14 PROCEDURE — 87070 CULTURE OTHR SPECIMN AEROBIC: CPT | Performed by: INTERNAL MEDICINE

## 2024-10-14 PROCEDURE — 84156 ASSAY OF PROTEIN URINE: CPT | Performed by: INTERNAL MEDICINE

## 2024-10-14 PROCEDURE — 84145 PROCALCITONIN (PCT): CPT | Performed by: INTERNAL MEDICINE

## 2024-10-14 PROCEDURE — 25510000001 IOPAMIDOL PER 1 ML: Performed by: FAMILY MEDICINE

## 2024-10-14 PROCEDURE — 94799 UNLISTED PULMONARY SVC/PX: CPT

## 2024-10-14 PROCEDURE — 25010000002 CEFTRIAXONE PER 250 MG: Performed by: FAMILY MEDICINE

## 2024-10-14 PROCEDURE — 25010000002 METHYLPREDNISOLONE PER 40 MG: Performed by: FAMILY MEDICINE

## 2024-10-14 PROCEDURE — 85027 COMPLETE CBC AUTOMATED: CPT | Performed by: FAMILY MEDICINE

## 2024-10-14 PROCEDURE — 94664 DEMO&/EVAL PT USE INHALER: CPT

## 2024-10-14 PROCEDURE — 87205 SMEAR GRAM STAIN: CPT | Performed by: INTERNAL MEDICINE

## 2024-10-14 PROCEDURE — 82948 REAGENT STRIP/BLOOD GLUCOSE: CPT

## 2024-10-14 PROCEDURE — 83036 HEMOGLOBIN GLYCOSYLATED A1C: CPT | Performed by: INTERNAL MEDICINE

## 2024-10-14 PROCEDURE — 82570 ASSAY OF URINE CREATININE: CPT | Performed by: INTERNAL MEDICINE

## 2024-10-14 PROCEDURE — 25010000002 ENOXAPARIN PER 10 MG: Performed by: FAMILY MEDICINE

## 2024-10-14 PROCEDURE — 80053 COMPREHEN METABOLIC PANEL: CPT | Performed by: FAMILY MEDICINE

## 2024-10-14 PROCEDURE — 84484 ASSAY OF TROPONIN QUANT: CPT | Performed by: FAMILY MEDICINE

## 2024-10-14 PROCEDURE — 99233 SBSQ HOSP IP/OBS HIGH 50: CPT | Performed by: INTERNAL MEDICINE

## 2024-10-14 PROCEDURE — 71260 CT THORAX DX C+: CPT

## 2024-10-14 PROCEDURE — 83605 ASSAY OF LACTIC ACID: CPT | Performed by: STUDENT IN AN ORGANIZED HEALTH CARE EDUCATION/TRAINING PROGRAM

## 2024-10-14 RX ORDER — ASPIRIN 81 MG/1
81 TABLET ORAL DAILY
Status: DISCONTINUED | OUTPATIENT
Start: 2024-10-14 | End: 2024-10-15 | Stop reason: HOSPADM

## 2024-10-14 RX ORDER — BUDESONIDE 0.5 MG/2ML
0.5 INHALANT ORAL
Status: DISCONTINUED | OUTPATIENT
Start: 2024-10-14 | End: 2024-10-15 | Stop reason: HOSPADM

## 2024-10-14 RX ORDER — IPRATROPIUM BROMIDE AND ALBUTEROL SULFATE 2.5; .5 MG/3ML; MG/3ML
3 SOLUTION RESPIRATORY (INHALATION) EVERY 6 HOURS PRN
Status: DISCONTINUED | OUTPATIENT
Start: 2024-10-14 | End: 2024-10-15 | Stop reason: HOSPADM

## 2024-10-14 RX ORDER — IOPAMIDOL 755 MG/ML
100 INJECTION, SOLUTION INTRAVASCULAR
Status: COMPLETED | OUTPATIENT
Start: 2024-10-14 | End: 2024-10-14

## 2024-10-14 RX ORDER — AZITHROMYCIN 250 MG/1
250 TABLET, FILM COATED ORAL EVERY 24 HOURS
Status: DISCONTINUED | OUTPATIENT
Start: 2024-10-14 | End: 2024-10-15 | Stop reason: HOSPADM

## 2024-10-14 RX ORDER — ENOXAPARIN SODIUM 100 MG/ML
40 INJECTION SUBCUTANEOUS DAILY
Status: DISCONTINUED | OUTPATIENT
Start: 2024-10-14 | End: 2024-10-15 | Stop reason: HOSPADM

## 2024-10-14 RX ORDER — ARFORMOTEROL TARTRATE 15 UG/2ML
15 SOLUTION RESPIRATORY (INHALATION)
Status: DISCONTINUED | OUTPATIENT
Start: 2024-10-14 | End: 2024-10-15 | Stop reason: HOSPADM

## 2024-10-14 RX ADMIN — ARFORMOTEROL TARTRATE 15 MCG: 15 SOLUTION RESPIRATORY (INHALATION) at 11:24

## 2024-10-14 RX ADMIN — BUDESONIDE 0.5 MG: 0.5 SUSPENSION RESPIRATORY (INHALATION) at 11:24

## 2024-10-14 RX ADMIN — Medication 10 ML: at 20:09

## 2024-10-14 RX ADMIN — BUDESONIDE 0.5 MG: 0.5 SUSPENSION RESPIRATORY (INHALATION) at 20:52

## 2024-10-14 RX ADMIN — IPRATROPIUM BROMIDE AND ALBUTEROL SULFATE 3 ML: .5; 3 SOLUTION RESPIRATORY (INHALATION) at 00:51

## 2024-10-14 RX ADMIN — GUAIFENESIN AND DEXTROMETHORPHAN 10 ML: 100; 10 SYRUP ORAL at 02:57

## 2024-10-14 RX ADMIN — ENOXAPARIN SODIUM 40 MG: 100 INJECTION SUBCUTANEOUS at 16:06

## 2024-10-14 RX ADMIN — GUAIFENESIN AND DEXTROMETHORPHAN 10 ML: 100; 10 SYRUP ORAL at 23:59

## 2024-10-14 RX ADMIN — ASPIRIN 81 MG: 81 TABLET, COATED ORAL at 08:07

## 2024-10-14 RX ADMIN — GUAIFENESIN 600 MG: 600 TABLET ORAL at 08:07

## 2024-10-14 RX ADMIN — PANTOPRAZOLE SODIUM 40 MG: 40 TABLET, DELAYED RELEASE ORAL at 05:17

## 2024-10-14 RX ADMIN — GUAIFENESIN 600 MG: 600 TABLET ORAL at 20:09

## 2024-10-14 RX ADMIN — SODIUM CHLORIDE 1000 MG: 9 INJECTION, SOLUTION INTRAMUSCULAR; INTRAVENOUS; SUBCUTANEOUS at 16:07

## 2024-10-14 RX ADMIN — IPRATROPIUM BROMIDE AND ALBUTEROL SULFATE 3 ML: .5; 3 SOLUTION RESPIRATORY (INHALATION) at 07:25

## 2024-10-14 RX ADMIN — AZITHROMYCIN DIHYDRATE 250 MG: 250 TABLET ORAL at 16:07

## 2024-10-14 RX ADMIN — LOSARTAN POTASSIUM 25 MG: 25 TABLET, FILM COATED ORAL at 08:07

## 2024-10-14 RX ADMIN — IOPAMIDOL 100 ML: 755 INJECTION, SOLUTION INTRAVENOUS at 03:11

## 2024-10-14 RX ADMIN — ARFORMOTEROL TARTRATE 15 MCG: 15 SOLUTION RESPIRATORY (INHALATION) at 20:51

## 2024-10-14 RX ADMIN — METHYLPREDNISOLONE SODIUM SUCCINATE 40 MG: 40 INJECTION, POWDER, FOR SOLUTION INTRAMUSCULAR; INTRAVENOUS at 16:07

## 2024-10-14 RX ADMIN — Medication 10 ML: at 08:09

## 2024-10-14 NOTE — PLAN OF CARE
Goal Outcome Evaluation:         Pt a/ox4, Pt on 2LNC, pt rectal temp was 96.4 at one point and lactic was ordered. Lactic was 3.5. Pt went down for CT of chest and Pt tolerated well. Pt has been coughing more. Bear Hugger was ordered and temp came up to 97.4. Will continue plan of care.

## 2024-10-14 NOTE — H&P
[facility]   HISTORY AND PHYSICAL    Patient Name: Lianne Bardales  : 1945  MRN: [id number]  Primary Care Physician:  Sam Hampton Jr., MD  Date of admission: 10/13/2024    Subjective   Subjective     Chief Complaint: Shortness of breath, generalized weakness    HPI:    Lianne Bardales is a 79 y.o. 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  Patient states that for the last week or so she has not been feeling well with worsening shortness of breath, a persistent cough, intermittent chills, decreased appetite with limited p.o. intake, intermittent confusion, and right-sided chest wall pain especially when coughing.  Patient's family checked up on her and noticed that she was confused and short of breath and they checked her oxygen saturation is in the high 70s low 80s.  Patient was then immediately brought to the ED for further evaluation.  In the ED patient was hypoxic on arrival requiring oxygen supplementation via nasal cannula and tachycardic with remaining vitals being within normal limits.  UA did show that she had a significant UTI with labs showing reduced renal function, abnormal LFTs, and elevated D-dimer with remaining labs being unremarkable including a normal lactic acid and negative COVID flu RSV.  Chest x-ray shows findings consistent with emphysema and basilar density likely atelectasis.  When seen patient was breathing much better on nasal cannula and denied any headaches, focal weakness,  abdominal pain, nausea, vomiting, diarrhea, constipation, dysuria, hematuria, hematochezia, melena, or anxiety.  Patient admitted for further evaluation and treatment.    Review of Systems   All systems were reviewed and negative except for: As per HPI    Personal History     Past Medical History:   Diagnosis Date     COPD (chronic obstructive pulmonary disease)     History of percutaneous coronary intervention     Lung nodule        Past Surgical History:   Procedure Laterality Date    BREAST BIOPSY Right     CARDIAC CATHETERIZATION N/A 01/11/2023    FINGER SURGERY      trigger thumb    FOOT SURGERY      GALLBLADDER SURGERY      gallstones pt is unsure if they removed galbladder    HYSTERECTOMY      LUNG BIOPSY         Family History: family history includes Cancer in her brother and mother; Diabetes in her mother; Heart failure in her mother. Otherwise pertinent FHx was reviewed and not pertinent to current issue.    Social History:  reports that she quit smoking about 34 years ago. Her smoking use included cigarettes. She started smoking about 68 years ago. She has a 68 pack-year smoking history. She has been exposed to tobacco smoke. She has never used smokeless tobacco. She reports current alcohol use of about 2.0 standard drinks of alcohol per week. Drug use questions deferred to the physician.    Home Medications:  Diclofenac Sodium, Fluticasone-Umeclidin-Vilant, albuterol sulfate HFA, aspirin, empagliflozin, esomeprazole, losartan, magnesium, rosuvastatin, and spironolactone      Allergies:  Allergies   Allergen Reactions    Sulfamethoxazole-Trimethoprim GI Intolerance       Objective   Objective     Vitals:   Temp:  [97.7 °F (36.5 °C)-99.3 °F (37.4 °C)] 97.7 °F (36.5 °C)  Heart Rate:  [101-121] 107  Resp:  [18-26] 20  BP: (101-128)/(43-73) 122/57  Flow (L/min) (Oxygen Therapy):  [2-4] 2  Physical Exam    Constitutional: Awake, alert   Eyes: PERRLA, sclerae anicteric, no conjunctival injection   HENT: NCAT, mucous membranes moist   Neck: Supple, no thyromegaly, no lymphadenopathy, trachea midline   Respiratory: Decreased breath sounds bilaterally, nonlabored respirations    Cardiovascular: Tachycardia, no murmurs, rubs, or gallops, palpable pedal pulses bilaterally   Gastrointestinal: Positive bowel sounds, soft,  nontender, nondistended   Musculoskeletal: No bilateral ankle edema, no clubbing or cyanosis to extremities   Psychiatric: Appropriate affect, cooperative   Neurologic: Oriented x 3, strength symmetric in all extremities, Cranial Nerves grossly intact to confrontation, speech clear   Skin: No rashes     Result Review    Result Review:  I have personally reviewed the results from the time of this admission to 10/13/2024 22:37 EDT and agree with these findings:  [x]  Laboratory list / accordion  []  Microbiology  [x]  Radiology  [x]  EKG/Telemetry   []  Cardiology/Vascular   []  Pathology  []  Old records  []  Other:  Most notable findings include: EKG showed sinus tachycardia with no significant ST changes.  UA positive for UTI, reduced renal function, abnormal LFTs, normal magnesium level, elevated D-dimer, normal epigastric, negative COVID flu RSV, chest x-ray negative for any acute findings      Assessment & Plan   Assessment / Plan     Brief Patient Summary:  Lianne Bardales is a 79 y.o. 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      Active Hospital Problems:  Active Hospital Problems    Diagnosis     **Acute on chronic respiratory failure with hypoxia     Hypoxia     UTI (urinary tract infection), bacterial     Chronic diastolic congestive heart failure     Primary hypertension     Tobacco abuse, in remission     COPD with acute exacerbation     Acid reflux      Plan:     Acute on chronic respiratory failure with hypoxia  -Admit to telemetry  -Likely secondary to COPD exacerbations  -Pulmonary embolism also possible  -Elevated D-dimer  -Will give therapeutic dose of Lovenox for now  -Will order CTA of the chest if renal function improved.  VQ scan in the morning if not  -Supplemental oxygen as needed, wean down as tolerated  -ABG  -IV  Solu-Medrol  -DuoNeb 3 times daily and as needed  -Empiric antibiotics  -Mucinex, Tessalon Perles  -Incentive spirometry  -Adjust home meds if warranted  -Consult pulmonology if warranted  -Supportive care    UTI  -UA reviewed  -Empiric antibiotics  -Urine, blood cultures  -IVF  -Supportive care    Generalized weakness, AMS  -Secondary to above  -Fall precautions  -PT OT  -Will follow along    HTN  -Currently well controlled  -PRN BP meds  -Resume home meds when available  -Titrate if needed    CAD    GI ppx  DVT ppx        VTE Prophylaxis:  No VTE prophylaxis order currently exists.        CODE STATUS:    Level Of Support Discussed With: Patient  Code Status (Patient has no pulse and is not breathing): CPR (Attempt to Resuscitate)  Medical Interventions (Patient has pulse or is breathing): Full Support    Admission Status:  I believe this patient meets inpatient status.      Electronically signed by Dakotah Bearden MD, 10/13/24, 10:37 PM EDT.

## 2024-10-14 NOTE — PLAN OF CARE
Goal Outcome Evaluation:  Plan of Care Reviewed With: patient, child        Progress: improving  Outcome Evaluation: Pt's VSS. Provider and famil at the bedside. Call light and personal items within reach. All safety measures in place. All meds given per MAR and tolerated well. Walked 2 lap around unit. tolerated well. Ate 100% of meals. Denied pain or discomfort throughtshift. Continue plan of care.

## 2024-10-14 NOTE — PROGRESS NOTES
[facility]   Hospitalist Progress Note  Date: 10/14/2024  Patient Name: Lianne Bardales  : 1945  MRN: [id number]  Date of admission: 10/13/2024      Subjective   Subjective     Chief Complaint: Follow up for shortness of breath    Summary: 79-year-old female with COPD/asthma overlap, HTN, HLD, CAD who presented with shortness of breath and generalized weakness.  SpO2 86% on 2 L of oxygen.  Tachycardic, tachypneic, afebrile.  WBC WNL.  Lactate normal. CXR bibasilar atelectasis.  CT chest with contrast no PE but showed mediastinal adenopathy, atelectasis and right middle lobe infiltrates. Admitted for COPD exacerbation and PNA.    Interval Followup:   Afebrile.  Blood pressure controlled.  On 2 L nasal cannula  Feeling a little bit better.  Not as short of breath at rest, coughing but having difficulty bringing sputum up, no hemoptysis, no wheezing, no chest pain or palpitations, has not ambulated much yet.   No dysuria, suprapubic pain, foul-smelling urine, increased urinary frequency    Objective   Objective     Vitals:   Temp:  [96.4 °F (35.8 °C)-99.3 °F (37.4 °C)] 97.2 °F (36.2 °C)  Heart Rate:  [] 79  Resp:  [18-26] 20  BP: (101-128)/(43-73) 107/57  Flow (L/min) (Oxygen Therapy):  [2-4] 2  Physical Exam    Constitutional: conversant, NAD   Respiratory: Diminished breath sounds bilaterally, no wheezing, nonlabored respirations    Cardiovascular:  RRR, no edema   Gastrointestinal: soft, nondistended   Neurologic: Alert, speech clear   Skin: Extremities warm    Result Review    Result Review:  I have personally reviewed the following over the last 24 hours (07:00 to 07:00) and agree with the following findings  [x]  Laboratory  CBC          2024    11:59 10/13/2024    13:53 10/14/2024    07:37   CBC   WBC 6.14  10.67  10.71    RBC 4.14  3.82  3.41    Hemoglobin 14.3  13.1  11.7    Hematocrit 43.1  38.6  34.3    .1  101.0  100.6    MCH 34.5  34.3  34.3    MCHC 33.2  33.9  34.1     RDW 13.2  13.4  13.3    Platelets 222  187  193      BMP          9/23/2024    11:59 10/13/2024    13:53 10/13/2024    23:17 10/14/2024    00:55   BMP   BUN 24  25  23  23    Creatinine 0.86  1.61  1.47  1.44    Sodium 136  135  136  138    Potassium 4.3  4.1  4.2  4.2    Chloride 100  98  102  102    CO2 23.0  24.3  18.6  20.3    Calcium 10.0  8.6  8.6  8.9      [x]  Microbiology  Blood cultures NGTD  [x]  Radiology   CT Chest With Contrast Diagnostic    Result Date: 10/14/2024  CT CHEST W CONTRAST DIAGNOSTIC Date of Exam: 10/14/2024 3:05 AM EDT Indication: Rule out PE.  SOB. Increased O2 requirements. Comparison: 8/31/2023 Technique: Axial CT images were obtained of the chest after the uneventful intravenous administration of iodinated contrast.  Reconstructed coronal and sagittal images were also obtained. Automated exposure control and iterative construction methods were  used. Findings: There is no pulmonary embolism or aortic dissection. There is atherosclerotic disease and coronary artery disease. There is a trace amount of right pleural fluid. No left pleural effusion or pericardial effusion identified. AP window adenopathy measures 1.3 cm. Precarinal adenopathy measures 1.3 cm. No axillary or hilar adenopathy is seen. Upper abdominal images show cholecystectomy. As seen on the prior study, there is an atherosclerotic high-grade stenosis at the origin of the celiac trunk. Lung windows show emphysema. There is a staple line in the right upper lobe with adjacent scarring. There are acute alveolar infiltrates in the right middle lobe compatible with pneumonia. There is some minimal less dense infiltrate in the anterior right  upper lobe and in the dependent right lower lobe that may reflect atelectasis or pneumonia. No definite pulmonary nodules. No pneumothorax.     1. No PE or aortic dissection. 2. Atherosclerotic disease and coronary artery disease. 3. Mild mediastinal adenopathy that may be reactive.  Attention on 3-month follow-up chest CT recommended. 4. COPD with postoperative change in the right upper lung. 5. Infiltrates in the right middle lobe concerning for pneumonia. There is mild atelectasis or pneumonia noted in the right lower lobe and anterior right upper lobe as well. There is also a trace amount of right pleural fluid. Electronically Signed: Palmer Calderón MD  10/14/2024 3:38 AM EDT  Workstation ID: [id number]    XR Chest 1 View    Result Date: 10/13/2024  XR CHEST 1 VW Date of Exam: 10/13/2024 1:33 PM EDT Indication: Weakness, dizziness, altered mental status. Comparison: 7/5/2023. Findings: The heart size is normal. The pulmonary vascular markings are normal. There are underlying emphysematous changes. There are sutures in the right apex. There are basilar linear densities likely due to subsegmental atelectasis. There is no pleural effusion  or pneumothorax. There are chronic age-related changes involving the bony thorax and thoracic aorta.     Impression: 1. Emphysema. 2. Basilar densities likely due to subsegmental atelectasis. Electronically Signed: Gunnar Castro MD  10/13/2024 2:28 PM EDT  Workstation ID: [id number]     [x]  EKG/Telemetry monitor personally reviewed and independently interpreted: NSR/sinus tachycardia  []  Cardiology/Vascular   []  Pathology  []  Old records  []  Other:    Assessment & Plan   Assessment / Plan     Assessment/Plan:  COPD/asthma overlap with acute exacerbation  Right middle lobe pneumonia  Acute hypoxic respiratory failure  Primary hypertension  Asymptomatic bacteriuria  Renal insufficiency  Chronic diastolic congestive heart failure  Transaminitis  Metabolic acidosis  Dyslipidemia  Coronary artery calcification     Remains afebrile. White count normal.  Lactate normalized  Stop IV fluids  Check procalcitonin, strep and Legionella urine antigen. Respiratory culture if able  Blood cultures pending  Continue azithromycin and Rocephin, day 2  Continue    On Trelegy  Ellipta at home.  Start Brovana and Pulmicort nebs twice daily  Switch DuoNebs to every 6 hours as needed  Continue IV Solu-Medrol 40 mg every 12 hours  Continue Mucinex 600 mg every 12 hours  Bronchopulmonary hygiene protocol in place. Encourage incentive spirometry  Wean supplemental oxygen, SpO2 goal >90%    Has 3+ glucose, 1+ ketones, 4+ bacteria in the urine.  No symptoms of UTI.  Did have mild metabolic acidosis with elevated anion gap which is since normalized.  Creatinine improved to 1.25 but still above baseline around 0.8.  Check hemoglobin A1c, UPCR.  Hold Jardiance  Hold statin given elevated liver enzyme  Hold losartan  Avoid nephrotoxic agents  VTE prophylaxis: Lovenox 40 mg daily  CBC, CMP in a.m.      Discussed plan with RN.    VTE Prophylaxis:  Pharmacologic VTE prophylaxis orders are present.    I spent greater than 50 minutes minutes of time for evaluation and management of this patient including review of chart, labs pertinent imaging available as well as medical decision making and discussion with physicians, staff and patient.       CODE STATUS:   Level Of Support Discussed With: Patient  Code Status (Patient has no pulse and is not breathing): CPR (Attempt to Resuscitate)  Medical Interventions (Patient has pulse or is breathing): Full Support    Electronically signed by Jason Mann DO, 10/14/24, 11:09 AM EDT.

## 2024-10-15 ENCOUNTER — READMISSION MANAGEMENT (OUTPATIENT)
Dept: CALL CENTER | Facility: HOSPITAL | Age: 79
End: 2024-10-15
Payer: MEDICARE

## 2024-10-15 VITALS
HEIGHT: 64 IN | OXYGEN SATURATION: 95 % | WEIGHT: 127.87 LBS | TEMPERATURE: 97.3 F | RESPIRATION RATE: 18 BRPM | HEART RATE: 74 BPM | BODY MASS INDEX: 21.83 KG/M2 | DIASTOLIC BLOOD PRESSURE: 66 MMHG | SYSTOLIC BLOOD PRESSURE: 122 MMHG

## 2024-10-15 LAB
ALBUMIN SERPL-MCNC: 3.1 G/DL (ref 3.5–5.2)
ALBUMIN/GLOB SERPL: 0.9 G/DL
ALP SERPL-CCNC: 161 U/L (ref 39–117)
ALT SERPL W P-5'-P-CCNC: 68 U/L (ref 1–33)
ANION GAP SERPL CALCULATED.3IONS-SCNC: 9.7 MMOL/L (ref 5–15)
AST SERPL-CCNC: 96 U/L (ref 1–32)
BILIRUB SERPL-MCNC: 0.2 MG/DL (ref 0–1.2)
BUN SERPL-MCNC: 24 MG/DL (ref 8–23)
BUN/CREAT SERPL: 18.5 (ref 7–25)
CALCIUM SPEC-SCNC: 8.7 MG/DL (ref 8.6–10.5)
CHLORIDE SERPL-SCNC: 103 MMOL/L (ref 98–107)
CO2 SERPL-SCNC: 22.3 MMOL/L (ref 22–29)
CREAT SERPL-MCNC: 1.3 MG/DL (ref 0.57–1)
DEPRECATED RDW RBC AUTO: 50.2 FL (ref 37–54)
EGFRCR SERPLBLD CKD-EPI 2021: 42.2 ML/MIN/1.73
ERYTHROCYTE [DISTWIDTH] IN BLOOD BY AUTOMATED COUNT: 13.3 % (ref 12.3–15.4)
GLOBULIN UR ELPH-MCNC: 3.4 GM/DL
GLUCOSE SERPL-MCNC: 157 MG/DL (ref 65–99)
HCT VFR BLD AUTO: 34.1 % (ref 34–46.6)
HGB BLD-MCNC: 11.3 G/DL (ref 12–15.9)
MCH RBC QN AUTO: 33.6 PG (ref 26.6–33)
MCHC RBC AUTO-ENTMCNC: 33.1 G/DL (ref 31.5–35.7)
MCV RBC AUTO: 101.5 FL (ref 79–97)
PLATELET # BLD AUTO: 221 10*3/MM3 (ref 140–450)
PMV BLD AUTO: 9.6 FL (ref 6–12)
POTASSIUM SERPL-SCNC: 4.1 MMOL/L (ref 3.5–5.2)
PROCALCITONIN SERPL-MCNC: 0.4 NG/ML (ref 0–0.25)
PROT SERPL-MCNC: 6.5 G/DL (ref 6–8.5)
RBC # BLD AUTO: 3.36 10*6/MM3 (ref 3.77–5.28)
SODIUM SERPL-SCNC: 135 MMOL/L (ref 136–145)
WBC NRBC COR # BLD AUTO: 16.23 10*3/MM3 (ref 3.4–10.8)

## 2024-10-15 PROCEDURE — 25010000002 ENOXAPARIN PER 10 MG: Performed by: FAMILY MEDICINE

## 2024-10-15 PROCEDURE — 63710000001 PREDNISONE PER 1 MG: Performed by: INTERNAL MEDICINE

## 2024-10-15 PROCEDURE — 94799 UNLISTED PULMONARY SVC/PX: CPT

## 2024-10-15 PROCEDURE — 84145 PROCALCITONIN (PCT): CPT | Performed by: INTERNAL MEDICINE

## 2024-10-15 PROCEDURE — 99239 HOSP IP/OBS DSCHRG MGMT >30: CPT | Performed by: INTERNAL MEDICINE

## 2024-10-15 PROCEDURE — 80053 COMPREHEN METABOLIC PANEL: CPT | Performed by: INTERNAL MEDICINE

## 2024-10-15 PROCEDURE — 25010000002 METHYLPREDNISOLONE PER 40 MG: Performed by: FAMILY MEDICINE

## 2024-10-15 PROCEDURE — 85027 COMPLETE CBC AUTOMATED: CPT | Performed by: FAMILY MEDICINE

## 2024-10-15 PROCEDURE — 94664 DEMO&/EVAL PT USE INHALER: CPT

## 2024-10-15 RX ORDER — PREDNISONE 20 MG/1
40 TABLET ORAL
Status: DISCONTINUED | OUTPATIENT
Start: 2024-10-15 | End: 2024-10-15 | Stop reason: HOSPADM

## 2024-10-15 RX ORDER — PREDNISONE 20 MG/1
40 TABLET ORAL
Qty: 8 TABLET | Refills: 0 | Status: SHIPPED | OUTPATIENT
Start: 2024-10-16 | End: 2024-10-21

## 2024-10-15 RX ORDER — AZITHROMYCIN 250 MG/1
250 TABLET, FILM COATED ORAL DAILY
Qty: 3 TABLET | Refills: 0 | Status: SHIPPED | OUTPATIENT
Start: 2024-10-15 | End: 2024-10-18

## 2024-10-15 RX ORDER — FLUCONAZOLE 150 MG/1
150 TABLET ORAL ONCE AS NEEDED
Qty: 1 TABLET | Refills: 0 | Status: SHIPPED | OUTPATIENT
Start: 2024-10-15

## 2024-10-15 RX ORDER — ROSUVASTATIN CALCIUM 40 MG/1
20 TABLET, COATED ORAL
Start: 2024-10-15

## 2024-10-15 RX ADMIN — PANTOPRAZOLE SODIUM 40 MG: 40 TABLET, DELAYED RELEASE ORAL at 05:00

## 2024-10-15 RX ADMIN — ASPIRIN 81 MG: 81 TABLET, COATED ORAL at 08:21

## 2024-10-15 RX ADMIN — METHYLPREDNISOLONE SODIUM SUCCINATE 40 MG: 40 INJECTION, POWDER, FOR SOLUTION INTRAMUSCULAR; INTRAVENOUS at 04:44

## 2024-10-15 RX ADMIN — Medication 10 ML: at 08:22

## 2024-10-15 RX ADMIN — ENOXAPARIN SODIUM 40 MG: 100 INJECTION SUBCUTANEOUS at 08:21

## 2024-10-15 RX ADMIN — PREDNISONE 40 MG: 20 TABLET ORAL at 08:20

## 2024-10-15 RX ADMIN — BUDESONIDE 0.5 MG: 0.5 SUSPENSION RESPIRATORY (INHALATION) at 07:28

## 2024-10-15 RX ADMIN — ARFORMOTEROL TARTRATE 15 MCG: 15 SOLUTION RESPIRATORY (INHALATION) at 07:28

## 2024-10-15 RX ADMIN — GUAIFENESIN AND DEXTROMETHORPHAN 10 ML: 100; 10 SYRUP ORAL at 06:10

## 2024-10-15 RX ADMIN — GUAIFENESIN 600 MG: 600 TABLET ORAL at 08:21

## 2024-10-15 NOTE — NURSING NOTE
RN walked pt with daughter at her side for 600ft on the unit floor on continuous pulse oximeter. Pt oxygen saturation level was consistently in the low 90s. Did not drop below 90%. Pt denied SOB or fatigue or difficulty during and post walk. No signs of distress noted. Dr. Mann was made aware and agreed to continue to discharge.

## 2024-10-15 NOTE — OUTREACH NOTE
Prep Survey      Flowsheet Row Responses   [facility] patient discharged [facility]? [facility]   Is LACE score < 7 ? No   Eligibility [facility]   Date of Admission 10/13/24   Date of Discharge 10/15/24   Discharge Disposition Home or Self Care   Discharge diagnosis acute on chronic resp failure/COPD   Does the patient have one of the following disease processes/diagnoses(primary or secondary)? COPD   Does the patient have Home health ordered? No   Is there a DME ordered? No   Prep survey completed? Yes            RACHEL PHILLIPS - Registered Nurse

## 2024-10-15 NOTE — PLAN OF CARE
Goal Outcome Evaluation:              Outcome Evaluation: Pt VSS. Pt on 1LNC. Pt was able to walk half the unit on no oxygen then started to desat to 84%. Pt was then placed back to 1LNC and came up. Cough medicine was given per orders. Pt reports right side isn't hurting like before. Pt has been using her flutter and incentive spirometer. Will continue to monitor.

## 2024-10-15 NOTE — PLAN OF CARE
Goal Outcome Evaluation:  Plan of Care Reviewed With: patient, child        Progress: improving  Outcome Evaluation: Pts VSS. On room air. discharge education complete.

## 2024-10-15 NOTE — DISCHARGE SUMMARY
[facility]         HOSPITALIST  DISCHARGE SUMMARY    Patient Name: Lianne Bardales  : 1945  MRN: [id number]    Date of Admission: 10/13/2024  Date of Discharge:  10/15/24  Primary Care Physician: Sam Hampton Jr., MD    Consults       Date and Time Order Name Status Description    10/13/2024  3:34 PM Hospitalist (on-call MD unless specified)              Active and Resolved Hospital Problems:  COPD/asthma overlap with acute exacerbation  Right middle lobe pneumonia  Acute hypoxic respiratory failure  Primary hypertension  Asymptomatic bacteriuria  Renal insufficiency  Chronic diastolic congestive heart failure  Transaminitis  Metabolic acidosis  Dyslipidemia  Coronary artery calcification    Hospital Course     Hospital Course:  Lianne Bardales is a 79 y.o. female with COPD/asthma overlap, HTN, HLD, CAD who presented with shortness of breath and generalized weakness.  Afebrile, tachycardic, tachypneic. SpO2 86% on 2 L of oxygen.  WBC and lactate normal. CT chest with contrast no embolism but showed mediastinal adenopathy, atelectasis and right middle lobe infiltrates.  Received IV fluids, nebulizers, steroids, azithromycin and Rocephin.  Lactate normalized.  Microbiology returned negative.  Weaned to room air and passed 6-minute walk test.  Will complete short course of prednisone and antibiotics.  Follow-up in COPD clinic scheduled.  Discharged home in stable condition    Day of Discharge     Vital Signs:  Temp:  [97.2 °F (36.2 °C)-97.3 °F (36.3 °C)] 97.3 °F (36.3 °C)  Heart Rate:  [61-89] 74  Resp:  [18-20] 18  BP: (112-125)/(60-77) 122/66  Flow (L/min) (Oxygen Therapy):  [1-2] 1  Physical Exam:   GENERAL: conversant and nontoxic.  HEART: RRR. No edema  LUNGS: Clear. Nonlabored  ABDOMEN: Soft, nondistended  NEUROLOGIC: Alert, CN intact    Discharge Details        Discharge Medications        New Medications        Instructions Start Date   amoxicillin-clavulanate  875-125 MG per tablet  Commonly known as: AUGMENTIN   1 tablet, Oral, 2 Times Daily      azithromycin 250 MG tablet  Commonly known as: ZITHROMAX   250 mg, Oral, Daily      fluconazole 150 MG tablet  Commonly known as: Diflucan   150 mg, Oral, Once As Needed      predniSONE 20 MG tablet  Commonly known as: DELTASONE   40 mg, Oral, Daily With Breakfast   Start Date: October 16, 2024            Changes to Medications        Instructions Start Date   albuterol sulfate  (90 Base) MCG/ACT inhaler  Commonly known as: PROVENTIL HFA;VENTOLIN HFA;PROAIR HFA  What changed: See the new instructions.   USE 2 INHALATIONS BY MOUTH EVERY 4 HOURS AS NEEDED FOR WHEEZING  OR SHORTNESS OF AIR      rosuvastatin 40 MG tablet  Commonly known as: CRESTOR  What changed: how much to take   20 mg, Oral, Every Night at Bedtime      Trelegy Ellipta 100-62.5-25 MCG/ACT inhaler  Generic drug: Fluticasone-Umeclidin-Vilant  What changed: See the new instructions.   USE 1 INHALATION BY MOUTH ONCE  DAILY AT THE SAME TIME EACH DAY             Continue These Medications        Instructions Start Date   Aspirin Low Dose 81 MG EC tablet  Generic drug: aspirin   81 mg, Daily      Diclofenac Sodium 1 % gel gel  Commonly known as: VOLTAREN   4 g, Topical, 4 Times Daily PRN      empagliflozin 10 MG tablet tablet  Commonly known as: Jardiance   10 mg, Oral, Daily      esomeprazole 40 MG capsule  Commonly known as: nexIUM   40 mg, Oral, Every Morning Before Breakfast      losartan 25 MG tablet  Commonly known as: COZAAR   25 mg, Daily      magnesium 30 MG tablet   30 mg, 2 Times Daily      spironolactone 50 MG tablet  Commonly known as: ALDACTONE   50 mg, Oral, Daily               Allergies   Allergen Reactions    Sulfamethoxazole-Trimethoprim GI Intolerance       Discharge Disposition:  Home or Self Care    Diet:  Hospital:  Diet Order   Procedures    Diet: Cardiac; Healthy Heart (2-3 Na+); Fluid Consistency: Thin (IDDSI 0)       Discharge Activity:    Activity Instructions       Gradually Increase Activity Until at Pre-Hospitalization Level              CODE STATUS:  Code Status and Medical Interventions: CPR (Attempt to Resuscitate); Full Support   Ordered at: 10/13/24 1642     Level Of Support Discussed With:    Patient     Code Status (Patient has no pulse and is not breathing):    CPR (Attempt to Resuscitate)     Medical Interventions (Patient has pulse or is breathing):    Full Support         Future Appointments   Date Time Provider Department Center   3/25/2025 11:15 AM Sam Hampton Jr., MD [facility]       Additional Instructions for the Follow-ups that You Need to Schedule       Discharge Follow-up with Specified Provider: Dr Mcgovern; 2 Weeks   As directed      To: Dr Mcgovern   Follow Up: 2 Weeks                Pertinent  and/or Most Recent Results     PROCEDURES:   NONE    LAB RESULTS:      Lab 10/15/24  0550 10/14/24  0737 10/14/24  0452 10/14/24  0055 10/13/24  1402 10/13/24  1353   WBC 16.23* 10.71  --   --   --  10.67   HEMOGLOBIN 11.3* 11.7*  --   --   --  13.1   HEMATOCRIT 34.1 34.3  --   --   --  38.6   PLATELETS 221 193  --   --   --  187   NEUTROS ABS  --   --   --   --   --  8.77*   IMMATURE GRANS (ABS)  --   --   --   --   --  0.04   LYMPHS ABS  --   --   --   --   --  0.43*   MONOS ABS  --   --   --   --   --  1.36*   EOS ABS  --   --   --   --   --  0.02   .5* 100.6*  --   --   --  101.0*   PROCALCITONIN 0.40* 0.57*  --   --   --   --    LACTATE  --  1.6 2.7* 3.5* 0.7 1.1   D DIMER QUANT  --   --   --   --   --  3.50*         Lab 10/15/24  0550 10/14/24  0737 10/14/24  0055 10/13/24  2317 10/13/24  1353   SODIUM 135* 135* 138 136 135*   POTASSIUM 4.1 4.2 4.2 4.2 4.1   CHLORIDE 103 99 102 102 98   CO2 22.3 21.6* 20.3* 18.6* 24.3   ANION GAP 9.7 14.4 15.7* 15.4* 12.7   BUN 24* 23 23 23 25*   CREATININE 1.30* 1.25* 1.44* 1.47* 1.61*   EGFR 42.2* 44.2* 37.3* 36.4* 32.6*   GLUCOSE 157* 152* 188* 185* 113*   CALCIUM 8.7  8.6 8.9 8.6 8.6   MAGNESIUM  --   --   --   --  2.1   HEMOGLOBIN A1C  --  5.90*  --   --   --          Lab 10/15/24  0550 10/14/24  0055 10/13/24  1353   TOTAL PROTEIN 6.5 7.0 7.3   ALBUMIN 3.1* 3.3* 3.7   GLOBULIN 3.4 3.7 3.6   ALT (SGPT) 68* 84* 81*   AST (SGOT) 96* 147* 171*   BILIRUBIN 0.2 0.4 0.8   ALK PHOS 161* 196* 186*         Lab 10/14/24  0055 10/13/24  2317 10/13/24  1353   HSTROP T 14* 15* 20*                 Lab 10/13/24  1402   PH, ARTERIAL 7.433   PCO2, ARTERIAL 34.1*   PO2 ART 61.4*   O2 SATURATION ART 92.1*   HCO3 ART 22.8   BASE EXCESS ART -0.9     Brief Urine Lab Results  (Last result in the past 365 days)        Color   Clarity   Blood   Leuk Est   Nitrite   Protein   CREAT   Urine HCG        10/14/24 1537             30.9               Microbiology Results (last 10 days)       Procedure Component Value - Date/Time    Respiratory Culture - Sputum, Cough [[id number]] Collected: 10/14/24 1537    Lab Status: Preliminary result Specimen: Sputum from Cough Updated: 10/14/24 1624     Gram Stain Occasional WBCs seen      Few (2+) Gram negative bacilli      Rare (1+) Gram positive cocci    S. Pneumo Ag Urine or CSF - Urine, Urine, Clean Catch [[id number]]  (Normal) Collected: 10/13/24 1607    Lab Status: Final result Specimen: Urine, Clean Catch Updated: 10/14/24 1157     Strep Pneumo Ag Negative    Legionella Antigen, Urine - Urine, Urine, Clean Catch [[id number]]  (Normal) Collected: 10/13/24 1607    Lab Status: Final result Specimen: Urine, Clean Catch Updated: 10/14/24 1157     LEGIONELLA ANTIGEN, URINE Negative    Blood Culture - Blood, Arm, Left [[id number]]  (Normal) Collected: 10/13/24 1405    Lab Status: Preliminary result Specimen: Blood from Arm, Left Updated: 10/14/24 1415     Blood Culture No growth at 24 hours    Blood Culture - Blood, Arm, Left [[id number]]  (Normal) Collected: 10/13/24 1353    Lab Status: Preliminary result Specimen: Blood from Arm, Left Updated: 10/14/24 1400     Blood  Culture No growth at 24 hours    COVID-19, FLU A/B, RSV PCR 1 HR TAT - Swab, Nasopharynx [[id number]]  (Normal) Collected: 10/13/24 1346    Lab Status: Final result Specimen: Swab from Nasopharynx Updated: 10/13/24 1452     COVID19 Not Detected     Influenza A PCR Not Detected     Influenza B PCR Not Detected     RSV, PCR Not Detected    Narrative:      Fact sheet for providers: https://www.fda.gov/media/490005/download    Fact sheet for patients: https://www.fda.gov/media/750363/download    Test performed by PCR.            CT Chest With Contrast Diagnostic    Result Date: 10/14/2024  1. No PE or aortic dissection. 2. Atherosclerotic disease and coronary artery disease. 3. Mild mediastinal adenopathy that may be reactive. Attention on 3-month follow-up chest CT recommended. 4. COPD with postoperative change in the right upper lung. 5. Infiltrates in the right middle lobe concerning for pneumonia. There is mild atelectasis or pneumonia noted in the right lower lobe and anterior right upper lobe as well. There is also a trace amount of right pleural fluid. Electronically Signed: Palmer Calderón MD  10/14/2024 3:38 AM EDT  Workstation ID: [id number]    XR Chest 1 View    Result Date: 10/13/2024  Impression: 1. Emphysema. 2. Basilar densities likely due to subsegmental atelectasis. Electronically Signed: Gunnar Castro MD  10/13/2024 2:28 PM EDT  Workstation ID: [id number]        Labs Pending at Discharge:  Pending Labs       Order Current Status    Blood Culture - Blood, Arm, Left Preliminary result    Blood Culture - Blood, Arm, Left Preliminary result    Respiratory Culture - Sputum, Cough Preliminary result              Time spent on Discharge including face to face service: >30 minutes    Electronically signed by Jason Mann DO, 10/15/24, 9:48 AM EDT.

## 2024-10-16 ENCOUNTER — TRANSITIONAL CARE MANAGEMENT TELEPHONE ENCOUNTER (OUTPATIENT)
Dept: CALL CENTER | Facility: HOSPITAL | Age: 79
End: 2024-10-16
Payer: MEDICARE

## 2024-10-16 LAB
BACTERIA SPEC RESP CULT: NORMAL
GRAM STN SPEC: NORMAL

## 2024-10-16 NOTE — OUTREACH NOTE
Call Center TCM Note      Flowsheet Row Responses   [facility] patient discharged from? Early   Does the patient have one of the following disease processes/diagnoses(primary or secondary)? COPD   TCM attempt successful? Yes   Call start time 1532   Unsuccessful attempts Attempt 3   Call end time 1536   Discharge diagnosis acute on chronic resp failure/COPD   Meds reviewed with patient/caregiver? Yes   Is the patient having any side effects they believe may be caused by any medication additions or changes? No   Does the patient have all medications ordered at discharge? Yes   Prescription comments Pt has atbs, steroids as well as an MDI to use prn   Is the patient taking all medications as directed (includes completed medication regime)? Yes   Does the patient have an appointment with their PCP within 7-14 days of discharge? No appointments available   Nursing Interventions Routed TCM call to PCP office   Has home health visited the patient within 72 hours of discharge? N/A   Pulse Ox monitoring Intermittent   Pulse Ox device source Patient   O2 Sat comments sats 98% on room air   O2 Sat: education provided Sat levels, When to seek care   Psychosocial issues? No   Did the patient receive a copy of their discharge instructions? Yes   Nursing interventions Reviewed instructions with patient   What is the patient's perception of their health status since discharge? Improving  [Reports she is doing better, still has a cough but isn't coughing as much as she needs to she reports she has a pulled muscle in abdomen.  Encouraged deep breathing, ambulation, adequate po intake.  Aware to monitor for worsening resp s/s.]   Nursing Interventions Nurse provided patient education   Is the patient/caregiver able to teach back the hierarchy of who to call/visit for symptoms/problems? PCP, Specialist, Home health nurse, Urgent Care, ED, 911 Yes   Is the patient/caregiver able to teach back signs and symptoms of worsening  condition: Fever/chills, Chest pain, Shortness of breath   Is the patient/caregiver able to teach back importance of completing antibiotic course of treatment? Yes   TCM call completed? Yes   Call end time 1476            Grace Pascual RN    10/16/2024, 15:38 EDT

## 2024-10-16 NOTE — OUTREACH NOTE
Call Center TCM Note      Flowsheet Row Responses   [facility] facility patient discharged from? Early   Does the patient have one of the following disease processes/diagnoses(primary or secondary)? COPD   TCM attempt successful? No  [VR for Annabelle Lehman]   Call start time 0859   Unsuccessful attempts Attempt 1   Call Status Left message   Discharge diagnosis acute on chronic resp failure/COPD            Grace Pascual RN    10/16/2024, 09:03 EDT

## 2024-10-18 LAB
BACTERIA SPEC AEROBE CULT: NORMAL
BACTERIA SPEC AEROBE CULT: NORMAL

## 2024-10-21 ENCOUNTER — OFFICE VISIT (OUTPATIENT)
Dept: INTERNAL MEDICINE | Facility: CLINIC | Age: 79
End: 2024-10-21
Payer: MEDICARE

## 2024-10-21 VITALS
DIASTOLIC BLOOD PRESSURE: 74 MMHG | WEIGHT: 126.4 LBS | BODY MASS INDEX: 21.58 KG/M2 | SYSTOLIC BLOOD PRESSURE: 120 MMHG | OXYGEN SATURATION: 90 % | HEART RATE: 86 BPM | TEMPERATURE: 97 F | HEIGHT: 64 IN

## 2024-10-21 DIAGNOSIS — G47.00 INSOMNIA, UNSPECIFIED TYPE: ICD-10-CM

## 2024-10-21 DIAGNOSIS — J44.1 COPD WITH ACUTE EXACERBATION: ICD-10-CM

## 2024-10-21 DIAGNOSIS — R53.81 MALAISE: ICD-10-CM

## 2024-10-21 DIAGNOSIS — J18.9 COMMUNITY ACQUIRED PNEUMONIA OF RIGHT MIDDLE LOBE OF LUNG: Primary | ICD-10-CM

## 2024-10-21 DIAGNOSIS — R59.0 MEDIASTINAL ADENOPATHY: ICD-10-CM

## 2024-10-21 LAB
ALBUMIN SERPL-MCNC: 3.8 G/DL (ref 3.5–5.2)
ALBUMIN/GLOB SERPL: 1.2 G/DL
ALP SERPL-CCNC: 108 U/L (ref 39–117)
ALT SERPL W P-5'-P-CCNC: 56 U/L (ref 1–33)
ANION GAP SERPL CALCULATED.3IONS-SCNC: 11.8 MMOL/L (ref 5–15)
AST SERPL-CCNC: 41 U/L (ref 1–32)
BASOPHILS # BLD AUTO: 0.03 10*3/MM3 (ref 0–0.2)
BASOPHILS NFR BLD AUTO: 0.3 % (ref 0–1.5)
BILIRUB SERPL-MCNC: 0.3 MG/DL (ref 0–1.2)
BUN SERPL-MCNC: 30 MG/DL (ref 8–23)
BUN/CREAT SERPL: 26.5 (ref 7–25)
CALCIUM SPEC-SCNC: 9.6 MG/DL (ref 8.6–10.5)
CHLORIDE SERPL-SCNC: 98 MMOL/L (ref 98–107)
CO2 SERPL-SCNC: 27.2 MMOL/L (ref 22–29)
CREAT SERPL-MCNC: 1.13 MG/DL (ref 0.57–1)
DEPRECATED RDW RBC AUTO: 45.3 FL (ref 37–54)
EGFRCR SERPLBLD CKD-EPI 2021: 49.9 ML/MIN/1.73
EOSINOPHIL # BLD AUTO: 0.1 10*3/MM3 (ref 0–0.4)
EOSINOPHIL NFR BLD AUTO: 0.9 % (ref 0.3–6.2)
ERYTHROCYTE [DISTWIDTH] IN BLOOD BY AUTOMATED COUNT: 12.5 % (ref 12.3–15.4)
GLOBULIN UR ELPH-MCNC: 3.3 GM/DL
GLUCOSE SERPL-MCNC: 85 MG/DL (ref 65–99)
HCT VFR BLD AUTO: 41.4 % (ref 34–46.6)
HGB BLD-MCNC: 13.9 G/DL (ref 12–15.9)
IMM GRANULOCYTES # BLD AUTO: 0.55 10*3/MM3 (ref 0–0.05)
IMM GRANULOCYTES NFR BLD AUTO: 4.8 % (ref 0–0.5)
LYMPHOCYTES # BLD AUTO: 1.93 10*3/MM3 (ref 0.7–3.1)
LYMPHOCYTES NFR BLD AUTO: 16.9 % (ref 19.6–45.3)
MAGNESIUM SERPL-MCNC: 2.2 MG/DL (ref 1.6–2.4)
MCH RBC QN AUTO: 33.3 PG (ref 26.6–33)
MCHC RBC AUTO-ENTMCNC: 33.6 G/DL (ref 31.5–35.7)
MCV RBC AUTO: 99.3 FL (ref 79–97)
MONOCYTES # BLD AUTO: 1.25 10*3/MM3 (ref 0.1–0.9)
MONOCYTES NFR BLD AUTO: 11 % (ref 5–12)
NEUTROPHILS NFR BLD AUTO: 66.1 % (ref 42.7–76)
NEUTROPHILS NFR BLD AUTO: 7.53 10*3/MM3 (ref 1.7–7)
NRBC BLD AUTO-RTO: 0 /100 WBC (ref 0–0.2)
PLATELET # BLD AUTO: 371 10*3/MM3 (ref 140–450)
PMV BLD AUTO: 8.9 FL (ref 6–12)
POTASSIUM SERPL-SCNC: 4.6 MMOL/L (ref 3.5–5.2)
PROT SERPL-MCNC: 7.1 G/DL (ref 6–8.5)
RBC # BLD AUTO: 4.17 10*6/MM3 (ref 3.77–5.28)
SODIUM SERPL-SCNC: 137 MMOL/L (ref 136–145)
WBC NRBC COR # BLD AUTO: 11.39 10*3/MM3 (ref 3.4–10.8)

## 2024-10-21 PROCEDURE — 80053 COMPREHEN METABOLIC PANEL: CPT | Performed by: INTERNAL MEDICINE

## 2024-10-21 PROCEDURE — 85025 COMPLETE CBC W/AUTO DIFF WBC: CPT | Performed by: INTERNAL MEDICINE

## 2024-10-21 PROCEDURE — 83735 ASSAY OF MAGNESIUM: CPT | Performed by: INTERNAL MEDICINE

## 2024-10-21 RX ORDER — METHYLPREDNISOLONE 4 MG
TABLET, DOSE PACK ORAL
Qty: 21 TABLET | Refills: 0 | Status: SHIPPED | OUTPATIENT
Start: 2024-10-21 | End: 2024-10-25

## 2024-10-21 RX ORDER — LEVOFLOXACIN 500 MG/1
500 TABLET, FILM COATED ORAL DAILY
Qty: 7 TABLET | Refills: 0 | Status: SHIPPED | OUTPATIENT
Start: 2024-10-21 | End: 2024-10-25

## 2024-10-21 NOTE — PROGRESS NOTES
" Subjective   The ABCs of the Annual Wellness Visit  Medicare Wellness Visit      Lianne Bardalse is a 79 y.o. patient who presents for a Medicare Wellness Visit.    The following portions of the patient's history were reviewed and   updated as appropriate: {history reviewed:20406::\"allergies\",\"current medications\",\"past family history\",\"past medical history\",\"past social history\",\"past surgical history\",\"problem list\"}.    Compared to one year ago, the patient's physical   health is {better worse same:61761}.  Compared to one year ago, the patient's mental   health is {better worse same:72532}.    Recent Hospitalizations:  This patient has had a [facility] admission record on file within the last 365 days.  Current Medical Providers:  Patient Care Team:  Sam Hampton Jr., MD as PCP - General (Internal Medicine)  Mathew Mcgovern MD as Consulting Physician (Pulmonary Disease)    Outpatient Medications Prior to Visit   Medication Sig Dispense Refill    albuterol sulfate  (90 Base) MCG/ACT inhaler USE 2 INHALATIONS BY MOUTH EVERY 4 HOURS AS NEEDED FOR WHEEZING  OR SHORTNESS OF AIR (Patient taking differently: Inhale 2 puffs Every 4 (Four) Hours As Needed.) 108 g 11    Aspirin Low Dose 81 MG EC tablet Take 1 tablet by mouth Daily.      Diclofenac Sodium (VOLTAREN) 1 % gel gel Apply 4 g topically to the appropriate area as directed 4 (Four) Times a Day As Needed (pain). 350 g 1    empagliflozin (Jardiance) 10 MG tablet tablet Take 1 tablet by mouth Daily. 90 tablet 3    esomeprazole (nexIUM) 40 MG capsule TAKE 1 CAPSULE BY MOUTH IN THE  MORNING BEFORE BREAKFAST 90 capsule 3    fluconazole (Diflucan) 150 MG tablet Take 1 tablet by mouth 1 (One) Time As Needed (yeast infection) for up to 1 dose. 1 tablet 0    losartan (COZAAR) 25 MG tablet Take 1 tablet by mouth Daily.      magnesium 30 MG tablet Take 1 tablet by mouth 2 (Two) Times a Day.      rosuvastatin (CRESTOR) 40 MG tablet Take 0.5 " "tablets by mouth every night at bedtime.      spironolactone (ALDACTONE) 50 MG tablet TAKE 1 TABLET BY MOUTH DAILY 90 tablet 0    Trelegy Ellipta 100-62.5-25 MCG/ACT inhaler USE 1 INHALATION BY MOUTH ONCE  DAILY AT THE SAME TIME EACH DAY (Patient taking differently: Inhale 1 puff Daily.) 180 each 3     No facility-administered medications prior to visit.     No opioid medication identified on active medication list. I have reviewed chart for other potential  high risk medication/s and harmful drug interactions in the elderly.      Aspirin is on active medication list. {ASPIRIN ON MEDICATION LIST INDICATED/NOT INDICATED:65166}.      Patient Active Problem List   Diagnosis    Acid reflux    Anemia, iron deficiency    Asthma    COPD with acute exacerbation    Gall stones    Chronic dyspnea    History of fungal infection    Lung nodule    Tobacco abuse, in remission    Primary hypertension    Mixed hyperlipidemia    Coronary artery disease of native artery of native heart with stable angina pectoris    Intermittent right-sided chest pain    Chronic diastolic congestive heart failure    Acute on chronic respiratory failure with hypoxia    Hypoxia    UTI (urinary tract infection), bacterial     Advance Care Planning {Advance Care Planning Hyperlink:23}Advance Directive is not on file.  {ACP Discussion, Advance Directive not in EMR:02352}            Objective   There were no vitals filed for this visit.    Estimated body mass index is 21.95 kg/m² as calculated from the following:    Height as of 10/13/24: 162.6 cm (64\").    Weight as of 10/13/24: 58 kg (127 lb 13.9 oz).    BMI is within normal parameters. No other follow-up for BMI required.       Does the patient have evidence of cognitive impairment? {Yes/No:02025}  Lab Results   Component Value Date    TRIG 80 09/23/2024    HDL 94 (H) 09/23/2024    LDL 77 09/23/2024    VLDL 14 09/23/2024    HGBA1C 5.90 (H) 10/14/2024                                                       "                                          Health  Risk Assessment    Smoking Status:  Social History     Tobacco Use   Smoking Status Former    Current packs/day: 0.00    Average packs/day: 2.0 packs/day for 34.0 years (68.0 ttl pk-yrs)    Types: Cigarettes    Start date:     Quit date: 1990    Years since quittin.8    Passive exposure: Past   Smokeless Tobacco Never     Alcohol Consumption:  Social History     Substance and Sexual Activity   Alcohol Use Yes    Alcohol/week: 2.0 standard drinks of alcohol    Types: 2 Shots of liquor per week       Fall Risk Screen{Jump to RUSTadi Fall Risk Flowsheet:23}  STEADI Fall Risk Assessment was completed, and patient is at LOW risk for falls.Assessment completed on:2024    Depression Screenin/23/2024    11:16 AM   PHQ-2/PHQ-9 Depression Screening   Retired Little Interest or Pleasure in Doing Things 0-->not at all   Retired Feeling Down, Depressed or Hopeless 0-->not at all   Retired PHQ-9: Brief Depression Severity Measure Score 0     Health Habits and Functional and Cognitive Screenin/23/2024    11:16 AM   Functional & Cognitive Status   Do you have difficulty preparing food and eating? No   Do you have difficulty bathing yourself, getting dressed or grooming yourself? No   Do you have difficulty using the toilet? No   Do you have difficulty moving around from place to place? No   Do you have trouble with steps or getting out of a bed or a chair? No   Current Diet Well Balanced Diet   Dental Exam Up to date   Eye Exam Up to date   Exercise (times per week) 2 times per week   Current Exercises Include Yard Work;Gardening   Do you need help using the phone?  No   Are you deaf or do you have serious difficulty hearing?  Yes   Do you need help to go to places out of walking distance? No   Do you need help shopping? No   Do you need help preparing meals?  No   Do you need help with housework?  No   Do you need help with laundry? No   Do you  need help taking your medications? No   Do you need help managing money? No   Do you ever drive or ride in a car without wearing a seat belt? No   Have you felt unusual stress, anger or loneliness in the last month? No   Who do you live with? Spouse   If you need help, do you have trouble finding someone available to you? No   Have you been bothered in the last four weeks by sexual problems? No   Do you have difficulty concentrating, remembering or making decisions? No           Age-appropriate Screening Schedule:  Refer to the list below for future screening recommendations based on patient's age, sex and/or medical conditions. Orders for these recommended tests are listed in the plan section. The patient has been provided with a written plan.    Health Maintenance List  Health Maintenance   Topic Date Due    TDAP/TD VACCINES (1 - Tdap) Never done    ZOSTER VACCINE (3 of 3) 01/02/2020    RSV Vaccine - Adults (1 - 1-dose 75+ series) Never done    COVID-19 Vaccine (4 - 2023-24 season) 09/01/2024    INFLUENZA VACCINE  03/31/2025 (Originally 8/1/2024)    ANNUAL WELLNESS VISIT  09/23/2025    LIPID PANEL  09/23/2025    DXA SCAN  07/30/2026    HEPATITIS C SCREENING  Completed    Pneumococcal Vaccine 65+  Completed    COLORECTAL CANCER SCREENING  Discontinued                                                                                                                                                CMS Preventative Services Quick Reference  Risk Factors Identified During Encounter  {Medicare Wellness Risk Factors:36284}    The above risks/problems have been discussed with the patient.  Pertinent information has been shared with the patient in the After Visit Summary.  An After Visit Summary and PPPS were made available to the patient.    Follow Up:{Wrapup  Review (Popup)  Advance Care Planning  Labs  CC  Problem List  Visit Diagnosis  Medications  Result Review  Imaging  Health Maintenance  Quality   [facility] [place]  SnapShot  Encounters  Notes  Media  Procedures :23}   Next Medicare Wellness visit to be scheduled in 1 year.          Additional E&M Note during same encounter follows:  Patient has multiple medical problems which are significant and separately identifiable that require additional work above and beyond the Medicare Wellness Visit.      Chief Complaint  No chief complaint on file.    Lianne Bardales is a 79 y.o. female who presents to [facility]       Objective   Vital Signs:   There were no vitals filed for this visit.    Wt Readings from Last 3 Encounters:   10/13/24 58 kg (127 lb 13.9 oz)   09/23/24 57.2 kg (126 lb)   06/18/24 57.8 kg (127 lb 6.4 oz)     BP Readings from Last 3 Encounters:   10/15/24 122/66   09/23/24 117/73   06/18/24 116/78       Physical Exam    The following data was reviewed by Dhaval Abebe on 10/21/2024  {Early Ambulatory Labs (Optional):33677}  {Data reviewed (optional):26317}    Assessment & Plan   There are no diagnoses linked to this encounter.      BMI is within normal parameters. No other follow-up for BMI required.       FOLLOW UP  No follow-ups on file.  Patient was given instructions and counseling regarding her condition or for health maintenance advice. Please see specific information pulled into the AVS if appropriate.     Dhaval Abebe  10/21/24  13:24 EDT

## 2024-10-21 NOTE — PROGRESS NOTES
Transitional Care Follow Up Visit  Subjective     Liannelalo Bardales is a 79 y.o. female who presents for a transitional care management visit.    Within 48 business hours after discharge our office contacted her via telephone to coordinate her care and needs.      I reviewed and discussed the details of that call along with the discharge summary, hospital problems, inpatient lab results, inpatient diagnostic studies, and consultation reports with Lianne.     Current outpatient and discharge medications have been reconciled for the patient.  Reviewed by: Sam Hampton Jr., MD          10/15/2024     5:18 PM   Date of TCM Phone Call   [facility]   Date of Admission 10/13/2024   Date of Discharge 10/15/2024   Discharge Disposition Home or Self Care     Risk for Readmission (LACE) Score: 9 (10/15/2024  6:00 AM)      History of Present Illness     Course During Hospital Stay:    Patient admitted for COPD exacerbation and community-acquired pneumonia as demonstrated on CT scan of the chest.  Patient was found to be hypoxic requiring supplemental oxygen.  Patient was started Rocephin and azithromycin while inpatient.  Patient progressively improved.  She was later discharged on azithromycin and Augmentin.  Patient has completed her round of antibiotics and steroids.  She reports improvement every day until the past 24 hours.  She reports feeling more malaise, increased work of breathing, increased wheezing, increased cough.  Her sputum production is about the same.  Patient reports compliance with Trelegy.  She reports taking albuterol but unsure if it is helpful.     The following portions of the patient's history were reviewed and updated as appropriate: allergies, current medications, past family history, past medical history, past social history, past surgical history, and problem list.      Objective   Vitals:    10/21/24 1512   BP: 120/74   Pulse: 86   Temp: 97 °F (36.1 °C)   SpO2: 90%        Appearance: No acute distress, well-nourished  Head: normocephalic, atraumatic  Eyes: extraocular movements intact, no scleral icterus, no conjunctival injection  Ears, Nose, and Throat: external ears normal, nares patent, moist mucous membranes  Cardiovascular: regular rate and rhythm. no murmurs, rales, or rhonchi. no edema  Respiratory: breathing comfortably, symmetric chest rise, decreased air exchange in bilateral lungs.  No wheezes or rhonchi or rales noted.  Neuro: alert and oriented to time, place, and person. Normal gait  Psych: normal mood and affect     Result Review :   The following data was reviewed by: Sam Hampton Jr, MD on 10/21/2024:  Common labs          10/13/2024    13:53 10/13/2024    23:17 10/14/2024    00:55 10/14/2024    07:37 10/15/2024    05:50   Common Labs   Glucose 113  185  188  152  157    BUN 25  23  23  23  24    Creatinine 1.61  1.47  1.44  1.25  1.30    Sodium 135  136  138  135  135    Potassium 4.1  4.2  4.2  4.2  4.1    Chloride 98  102  102  99  103    Calcium 8.6  8.6  8.9  8.6  8.7    Albumin 3.7   3.3   3.1    Total Bilirubin 0.8   0.4   0.2    Alkaline Phosphatase 186   196   161    AST (SGOT) 171   147   96    ALT (SGPT) 81   84   68    WBC 10.67    10.71  16.23    Hemoglobin 13.1    11.7  11.3    Hematocrit 38.6    34.3  34.1    Platelets 187    193  221    Hemoglobin A1C    5.90         CT Chest With Contrast    (Results Pending)       Lab Results   Component Value Date    SARSANTIGEN Not Detected 06/18/2024    COVID19 Not Detected 10/13/2024    FLUAAG Not Detected 06/18/2024    FLUBAG Not Detected 06/18/2024    RSV Not Detected 10/13/2024    INR 1.0 01/11/2023    BILIRUBINUR Negative 10/13/2024       Assessment & Plan     Diagnoses and all orders for this visit:    1. Community acquired pneumonia of right middle lobe of lung (Primary)  Comments:  With hypoxia today.  Will treat with Levaquin as patient feeling worse and lung exam  abnormalities.  Orders:  -     levoFLOXacin (Levaquin) 500 MG tablet; Take 1 tablet by mouth Daily for 7 days.  Dispense: 7 tablet; Refill: 0    2. Mediastinal adenopathy  Comments:  Obtain chest CT in 3 months as recommended by radiology.  Orders:  -     CT Chest With Contrast; Future    3. Malaise  Comments:  Further eval with labs as ordered.  Orders:  -     Comprehensive Metabolic Panel  -     CBC & Differential  -     Magnesium    4. COPD with acute exacerbation  Comments:  Will extend steroid for another 5 days.  Can continue compliance with Trelegy and albuterol as needed.  Will consider supplemental O2.  Orders:  -     methylPREDNISolone (MEDROL) 4 MG dose pack; Take as directed on package instructions.  Dispense: 21 tablet; Refill: 0    5. Insomnia, unspecified type  Comments:  Refer to sleep to determine necessity for supplemental oxygen during sleep.  Orders:  -     Ambulatory Referral to Sleep Medicine        Medications Discontinued During This Encounter   Medication Reason    predniSONE (DELTASONE) 20 MG tablet *Therapy completed       Return in about 3 days (around 10/24/2024).           Sam Hampton Jr, MD  10/21/24  16:47 EDT

## 2024-10-22 ENCOUNTER — TELEPHONE (OUTPATIENT)
Dept: INTERNAL MEDICINE | Facility: CLINIC | Age: 79
End: 2024-10-22
Payer: MEDICARE

## 2024-10-22 NOTE — TELEPHONE ENCOUNTER
Called patient no answer left Vm    OKAY FOR HUB TO READ/ADVISE     Electrolytes and kidney function look good.     WBC improved, but shows more immature neutrophils - would take antibiotics as prescribed.

## 2024-10-22 NOTE — TELEPHONE ENCOUNTER
Name: Lianne Bardales Ryanne    Relationship: Self    Best Callback Number: [phone]    HUB PROVIDED THE RELAY MESSAGE FROM THE OFFICE   PATIENT VOICED UNDERSTANDING AND HAS NO FURTHER QUESTIONS AT THIS TIME    ADDITIONAL INFORMATION:

## 2024-10-22 NOTE — TELEPHONE ENCOUNTER
----- Message from Beatrice WILSON sent at 10/22/2024 10:19 AM EDT -----      ----- Message -----  From: Sam Hampton Jr., MD  Sent: 10/22/2024   7:53 AM EDT  To: [facility]    Electrolytes and kidney function look good.     WBC improved, but shows more immature neutrophils - would take antibiotics as prescribed.

## 2024-10-23 NOTE — PROGRESS NOTES
"Chief Complaint  Respiratory Distress    Subjective          Lianne Bardales presents to [facility]  History of Present Illness  COPD- patient reports compliance with trelegy. Patient is using albuterol 2-3 times daily.   Pna- she feels much better. Her O2 has been impving. Patient has follow-up chest ordered for 3 months.  Patient is not currently on supplemental oxygen.  Patient has a couple more days of steroids.  She also has 1 more day of Levaquin.  Mediastinal adenopathy-outpatient chest CT is ordered.    Current Outpatient Medications   Medication Instructions    albuterol sulfate  (90 Base) MCG/ACT inhaler USE 2 INHALATIONS BY MOUTH EVERY 4 HOURS AS NEEDED FOR WHEEZING  OR SHORTNESS OF AIR    Aspirin Low Dose 81 mg, Daily    Diclofenac Sodium (VOLTAREN) 4 g, Topical, 4 Times Daily PRN    empagliflozin (JARDIANCE) 10 mg, Oral, Daily    esomeprazole (NEXIUM) 40 mg, Oral, Every Morning Before Breakfast    fluconazole (DIFLUCAN) 150 mg, Oral, Once As Needed    losartan (COZAAR) 25 mg, Daily    magnesium 30 mg, 2 Times Daily    rosuvastatin (CRESTOR) 20 mg, Oral, Every Night at Bedtime    spironolactone (ALDACTONE) 50 mg, Oral, Daily    Trelegy Ellipta 100-62.5-25 MCG/ACT inhaler USE 1 INHALATION BY MOUTH ONCE  DAILY AT THE SAME TIME EACH DAY       The following portions of the patient's history were reviewed and updated as appropriate: allergies, current medications, past family history, past medical history, past social history, past surgical history, and problem list.    Objective   Vital Signs:   /77   Pulse 70   Temp 96 °F (35.6 °C)   Ht 162.6 cm (64\")   Wt 55.3 kg (122 lb)   SpO2 93%   BMI 20.94 kg/m²     BP Readings from Last 3 Encounters:   10/25/24 132/77   10/21/24 120/74   10/15/24 122/66     Wt Readings from Last 3 Encounters:   10/25/24 55.3 kg (122 lb)   10/21/24 57.3 kg (126 lb 6.4 oz)   10/13/24 58 kg (127 lb 13.9 oz)     BMI " is within normal parameters. No other follow-up for BMI required.     Physical Exam   Appearance: No acute distress, well-nourished  Head: normocephalic, atraumatic  Eyes: extraocular movements intact, no scleral icterus, no conjunctival injection  Ears, Nose, and Throat: external ears normal, nares patent, moist mucous membranes  Cardiovascular: regular rate and rhythm. no murmurs, rubs, or gallops. no edema  Respiratory: breathing comfortably, symmetric chest rise, clear to auscultation bilaterally. No wheezes, rales, or rhonchi.  Neuro: alert and oriented to time, place, and person. Normal gait  Psych: normal mood and affect     Result Review :   The following data was reviewed by: Sam Hampton Jr, MD on 10/25/2024:  Common labs          10/14/2024    00:55 10/14/2024    07:37 10/15/2024    05:50 10/21/2024    16:12   Common Labs   Glucose 188  152  157  85    BUN 23  23  24  30    Creatinine 1.44  1.25  1.30  1.13    Sodium 138  135  135  137    Potassium 4.2  4.2  4.1  4.6    Chloride 102  99  103  98    Calcium 8.9  8.6  8.7  9.6    Albumin 3.3   3.1  3.8    Total Bilirubin 0.4   0.2  0.3    Alkaline Phosphatase 196   161  108    AST (SGOT) 147   96  41    ALT (SGPT) 84   68  56    WBC  10.71  16.23  11.39    Hemoglobin  11.7  11.3  13.9    Hematocrit  34.3  34.1  41.4    Platelets  193  221  371    Hemoglobin A1C  5.90          Lab Results   Component Value Date    SARSANTIGEN Not Detected 06/18/2024    COVID19 Not Detected 10/13/2024    FLUAAG Not Detected 06/18/2024    FLUBAG Not Detected 06/18/2024    RSV Not Detected 10/13/2024    INR 1.0 01/11/2023    BILIRUBINUR Negative 10/13/2024          Assessment and Plan    Diagnoses and all orders for this visit:    1. Primary hypertension (Primary)  Comments:  Well-controlled on current regimen.    2. Lung nodule  Comments:  CT ordered to be obtained in approximately 2 months.    3. Hypoxia  Comments:  Improved today.  Encouraged follow-up with sleep  study.    4. COPD with acute exacerbation  Comments:  Improving.  Continue Trelegy.  Albuterol 3-4 times daily for next 3 to 4 days.  Complete steroids antibiotics as written.    5. Need for influenza vaccination  Comments:  Encouraged to get the next 1 to 2 weeks.    6. Need for RSV vaccination  Comments:  Encouraged to get the next 1 to 2 weeks.    7. Need for COVID-19 vaccine  Comments:  Encouraged to get the next 1 to 2 weeks.          Medications Discontinued During This Encounter   Medication Reason    levoFLOXacin (Levaquin) 500 MG tablet *Therapy completed    methylPREDNISolone (MEDROL) 4 MG dose pack *Therapy completed          Follow Up   Return in about 2 months (around 12/25/2024).  Patient was given instructions and counseling regarding her condition or for health maintenance advice. Please see specific information pulled into the AVS if appropriate.       Sam Hampton Jr, MD  10/25/24  10:40 EDT

## 2024-10-25 ENCOUNTER — OFFICE VISIT (OUTPATIENT)
Dept: INTERNAL MEDICINE | Facility: CLINIC | Age: 79
End: 2024-10-25
Payer: MEDICARE

## 2024-10-25 VITALS
WEIGHT: 122 LBS | HEART RATE: 70 BPM | DIASTOLIC BLOOD PRESSURE: 77 MMHG | TEMPERATURE: 96 F | HEIGHT: 64 IN | OXYGEN SATURATION: 93 % | BODY MASS INDEX: 20.83 KG/M2 | SYSTOLIC BLOOD PRESSURE: 132 MMHG

## 2024-10-25 DIAGNOSIS — Z23 NEED FOR COVID-19 VACCINE: ICD-10-CM

## 2024-10-25 DIAGNOSIS — I10 PRIMARY HYPERTENSION: Primary | ICD-10-CM

## 2024-10-25 DIAGNOSIS — R09.02 HYPOXIA: ICD-10-CM

## 2024-10-25 DIAGNOSIS — Z29.11 NEED FOR RSV VACCINATION: ICD-10-CM

## 2024-10-25 DIAGNOSIS — R91.1 LUNG NODULE: ICD-10-CM

## 2024-10-25 DIAGNOSIS — Z23 NEED FOR INFLUENZA VACCINATION: ICD-10-CM

## 2024-10-25 DIAGNOSIS — J44.1 COPD WITH ACUTE EXACERBATION: ICD-10-CM

## 2024-10-25 PROCEDURE — 3078F DIAST BP <80 MM HG: CPT | Performed by: INTERNAL MEDICINE

## 2024-10-25 PROCEDURE — 1126F AMNT PAIN NOTED NONE PRSNT: CPT | Performed by: INTERNAL MEDICINE

## 2024-10-25 PROCEDURE — 3075F SYST BP GE 130 - 139MM HG: CPT | Performed by: INTERNAL MEDICINE

## 2024-10-25 PROCEDURE — 99214 OFFICE O/P EST MOD 30 MIN: CPT | Performed by: INTERNAL MEDICINE

## 2024-10-28 LAB
QT INTERVAL: 304 MS
QTC INTERVAL: 422 MS

## 2024-10-29 ENCOUNTER — OFFICE VISIT (OUTPATIENT)
Dept: PULMONOLOGY | Facility: CLINIC | Age: 79
End: 2024-10-29
Payer: MEDICARE

## 2024-10-29 VITALS
HEIGHT: 64 IN | WEIGHT: 127.6 LBS | SYSTOLIC BLOOD PRESSURE: 96 MMHG | BODY MASS INDEX: 21.78 KG/M2 | OXYGEN SATURATION: 91 % | RESPIRATION RATE: 18 BRPM | TEMPERATURE: 98.4 F | HEART RATE: 103 BPM | DIASTOLIC BLOOD PRESSURE: 63 MMHG

## 2024-10-29 DIAGNOSIS — J41.8 MIXED SIMPLE AND MUCOPURULENT CHRONIC BRONCHITIS: ICD-10-CM

## 2024-10-29 DIAGNOSIS — J18.9 PNEUMONIA OF RIGHT MIDDLE LOBE DUE TO INFECTIOUS ORGANISM: Primary | ICD-10-CM

## 2024-10-29 NOTE — PROGRESS NOTES
Pulmonary Office Follow-up    Subjective     Lianne Bardales is seen today at the office for   Chief Complaint   Patient presents with    COPD    Pneumonia     ACUTE RESPIRATORY FAILURE  HOSPITAL FOLLOW UP         HPI  Lianne Bardales is a 79 y.o. female with a PMH significant for COPD and bronchial asthma who presents for follow-up after she was hospitalized for pneumonia right patient is doing better but continues to have weakness along with some shortness of breath specially at night when she feels restless she denies any chest pain fever or hemoptysis      Tobacco use history:  Former smoker      Patient Active Problem List   Diagnosis    Acid reflux    Anemia, iron deficiency    Asthma    COPD with acute exacerbation    Gall stones    Chronic dyspnea    History of fungal infection    Lung nodule    Tobacco abuse, in remission    Primary hypertension    Mixed hyperlipidemia    Coronary artery disease of native artery of native heart with stable angina pectoris    Intermittent right-sided chest pain    Chronic diastolic congestive heart failure    Acute on chronic respiratory failure with hypoxia    Hypoxia    UTI (urinary tract infection), bacterial       Review of Systems  Review of Systems   Respiratory:  Positive for shortness of breath.    All other systems reviewed and are negative.    As described in the HPI. Otherwise, remainder of ROS (14 systems) were negative.    Medications, Allergies, Social, and Family Histories reviewed as per EMR.    Result Review :            Objective     Vitals:    10/29/24 1331   BP: 96/63   Pulse: 103   Resp: 18   Temp: 98.4 °F (36.9 °C)   SpO2: 91%         10/29/24  1331   Weight: 57.9 kg (127 lb 9.6 oz)       Physical Exam  Vitals and nursing note reviewed.   Constitutional:       Appearance: Normal appearance.   HENT:      Head: Normocephalic and atraumatic.      Nose: Nose normal.      Mouth/Throat:      Mouth: Mucous membranes are moist.      Pharynx:  Oropharynx is clear.   Eyes:      Extraocular Movements: Extraocular movements intact.      Conjunctiva/sclera: Conjunctivae normal.      Pupils: Pupils are equal, round, and reactive to light.   Cardiovascular:      Rate and Rhythm: Normal rate and regular rhythm.      Pulses: Normal pulses.      Heart sounds: Normal heart sounds.   Pulmonary:      Effort: Pulmonary effort is normal.      Breath sounds: Wheezing and rhonchi present.   Musculoskeletal:         General: Normal range of motion.      Cervical back: Normal range of motion and neck supple.   Skin:     General: Skin is warm.      Capillary Refill: Capillary refill takes 2 to 3 seconds.   Neurological:      Mental Status: She is alert and oriented to person, place, and time.   Psychiatric:         Mood and Affect: Mood normal.         Behavior: Behavior normal.         CT Chest With Contrast Diagnostic    Result Date: 10/14/2024  1. No PE or aortic dissection. 2. Atherosclerotic disease and coronary artery disease. 3. Mild mediastinal adenopathy that may be reactive. Attention on 3-month follow-up chest CT recommended. 4. COPD with postoperative change in the right upper lung. 5. Infiltrates in the right middle lobe concerning for pneumonia. There is mild atelectasis or pneumonia noted in the right lower lobe and anterior right upper lobe as well. There is also a trace amount of right pleural fluid. Electronically Signed: Palmer Calderón MD  10/14/2024 3:38 AM EDT  Workstation ID: [id number]    XR Chest 1 View    Result Date: 10/13/2024  Impression: 1. Emphysema. 2. Basilar densities likely due to subsegmental atelectasis. Electronically Signed: Gunnar Castro MD  10/13/2024 2:28 PM EDT  Workstation ID: [id number]    DEXA Bone Density Axial    Result Date: 7/30/2024  Impression: Osteopenia -Based upon the FRAX score, patient does not meet criteria for initiation of pharmacological treatment recommendations for prevention of osteoporosis per the National  Osteoporosis Foundation (NOF) guidelines. However, individualized treatment decisions should be made accounting for additional clinical factors. Report dictated by: Mell Meng  I have personally reviewed this case and agree with the findings above: Electronically Signed: Westley Muñiz MD  7/30/2024 4:19 PM EDT  Workstation ID: [id number]      Assessment & Plan     Diagnoses and all orders for this visit:    1. Pneumonia of right middle lobe due to infectious organism (Primary)  -     Overnight Sleep Oximetry Study    2. Mixed simple and mucopurulent chronic bronchitis  -     Overnight Sleep Oximetry Study         Discussion/ Recommendations:   Patient is advised to continue her home medications including Trelegy  Will order overnight pulse oximetry  Continue regular exercise and home medications  Vaccinations discussed and recommended    BMI is within normal parameters. No other follow-up for BMI required.        Return in about 3 months (around 1/29/2025).          This document has been electronically signed by Mathew Mcgovern MD on October 29, 2024 13:39 EDT

## 2024-10-30 LAB
QT INTERVAL: 368 MS
QTC INTERVAL: 452 MS

## 2024-12-03 ENCOUNTER — TELEPHONE (OUTPATIENT)
Dept: PULMONOLOGY | Facility: CLINIC | Age: 79
End: 2024-12-03
Payer: MEDICARE

## 2024-12-03 DIAGNOSIS — J45.909 ASTHMA, UNSPECIFIED ASTHMA SEVERITY, UNSPECIFIED WHETHER COMPLICATED, UNSPECIFIED WHETHER PERSISTENT: ICD-10-CM

## 2024-12-03 DIAGNOSIS — J41.8 MIXED SIMPLE AND MUCOPURULENT CHRONIC BRONCHITIS: Primary | ICD-10-CM

## 2024-12-16 DIAGNOSIS — I50.32 CHRONIC DIASTOLIC CONGESTIVE HEART FAILURE: ICD-10-CM

## 2024-12-16 RX ORDER — SPIRONOLACTONE 50 MG/1
50 TABLET, FILM COATED ORAL DAILY
Qty: 90 TABLET | Refills: 0 | Status: SHIPPED | OUTPATIENT
Start: 2024-12-16

## 2024-12-17 ENCOUNTER — OFFICE VISIT (OUTPATIENT)
Dept: INTERNAL MEDICINE | Facility: CLINIC | Age: 79
End: 2024-12-17
Payer: MEDICARE

## 2024-12-17 VITALS
HEART RATE: 79 BPM | TEMPERATURE: 97.7 F | HEIGHT: 64 IN | SYSTOLIC BLOOD PRESSURE: 128 MMHG | WEIGHT: 132.8 LBS | DIASTOLIC BLOOD PRESSURE: 73 MMHG | OXYGEN SATURATION: 91 % | BODY MASS INDEX: 22.67 KG/M2

## 2024-12-17 DIAGNOSIS — I50.32 CHRONIC DIASTOLIC CONGESTIVE HEART FAILURE: ICD-10-CM

## 2024-12-17 DIAGNOSIS — G47.00 INSOMNIA, UNSPECIFIED TYPE: ICD-10-CM

## 2024-12-17 DIAGNOSIS — E78.2 MIXED HYPERLIPIDEMIA: ICD-10-CM

## 2024-12-17 DIAGNOSIS — R91.1 LUNG NODULE: ICD-10-CM

## 2024-12-17 DIAGNOSIS — J44.9 CHRONIC OBSTRUCTIVE PULMONARY DISEASE, UNSPECIFIED COPD TYPE: ICD-10-CM

## 2024-12-17 DIAGNOSIS — I10 PRIMARY HYPERTENSION: Primary | ICD-10-CM

## 2024-12-17 PROBLEM — J96.21 ACUTE ON CHRONIC RESPIRATORY FAILURE WITH HYPOXIA: Status: RESOLVED | Noted: 2024-10-13 | Resolved: 2024-12-17

## 2024-12-17 PROBLEM — J44.1 COPD WITH ACUTE EXACERBATION: Status: RESOLVED | Noted: 2020-01-06 | Resolved: 2024-12-17

## 2024-12-17 PROCEDURE — G2211 COMPLEX E/M VISIT ADD ON: HCPCS | Performed by: INTERNAL MEDICINE

## 2024-12-17 PROCEDURE — 99214 OFFICE O/P EST MOD 30 MIN: CPT | Performed by: INTERNAL MEDICINE

## 2024-12-17 PROCEDURE — 3074F SYST BP LT 130 MM HG: CPT | Performed by: INTERNAL MEDICINE

## 2024-12-17 PROCEDURE — 3078F DIAST BP <80 MM HG: CPT | Performed by: INTERNAL MEDICINE

## 2024-12-17 PROCEDURE — 1126F AMNT PAIN NOTED NONE PRSNT: CPT | Performed by: INTERNAL MEDICINE

## 2024-12-17 RX ORDER — LOSARTAN POTASSIUM 25 MG/1
25 TABLET ORAL DAILY
Qty: 90 TABLET | Refills: 3 | Status: SHIPPED | OUTPATIENT
Start: 2024-12-17 | End: 2025-12-17

## 2024-12-17 NOTE — PROGRESS NOTES
"Chief Complaint  Hypertension (Follow up )    Subjective          Lianne Bardales presents to [facility]  History of Present Illness  COPD-patient feeling much better than when she was diagnosed with pneumonia approximately 2 months ago.  Patient is now on overnight oxygen. Patient is compliant with trelegy. Patient will take albuterol prn.   Hyperlipidemia- doing well on statin  Mediastinal LAD - patient due for repeat chest CT at this time. It is not yet scheduled.  CHF-patient was restarted Jardiance.  She reports doing well without recurrent UTIs at this time.      Current Outpatient Medications   Medication Instructions    albuterol sulfate  (90 Base) MCG/ACT inhaler USE 2 INHALATIONS BY MOUTH EVERY 4 HOURS AS NEEDED FOR WHEEZING  OR SHORTNESS OF AIR    Aspirin Low Dose 81 mg, Daily    Diclofenac Sodium (VOLTAREN) 4 g, Topical, 4 Times Daily PRN    empagliflozin (JARDIANCE) 10 mg, Oral, Daily    esomeprazole (NEXIUM) 40 mg, Oral, Every Morning Before Breakfast    losartan (COZAAR) 25 mg, Oral, Daily    magnesium 30 mg, 2 Times Daily    rosuvastatin (CRESTOR) 20 mg, Oral, Every Night at Bedtime    spironolactone (ALDACTONE) 50 mg, Oral, Daily    Trelegy Ellipta 100-62.5-25 MCG/ACT inhaler USE 1 INHALATION BY MOUTH ONCE  DAILY AT THE SAME TIME EACH DAY       The following portions of the patient's history were reviewed and updated as appropriate: allergies, current medications, past family history, past medical history, past social history, past surgical history, and problem list.    Objective   Vital Signs:   /73 (BP Location: Left arm, Patient Position: Sitting)   Pulse 79   Temp 97.7 °F (36.5 °C) (Temporal)   Ht 162.6 cm (64\")   Wt 60.2 kg (132 lb 12.8 oz)   SpO2 91%   BMI 22.80 kg/m²     BP Readings from Last 3 Encounters:   12/17/24 128/73   10/29/24 96/63   10/25/24 132/77     Wt Readings from Last 3 Encounters:   12/17/24 60.2 kg (132 " lb 12.8 oz)   10/29/24 57.9 kg (127 lb 9.6 oz)   10/25/24 55.3 kg (122 lb)     BMI is within normal parameters. No other follow-up for BMI required.     Physical Exam   Appearance: No acute distress, well-nourished  Head: normocephalic, atraumatic  Eyes: extraocular movements intact, no scleral icterus, no conjunctival injection  Ears, Nose, and Throat: external ears normal, nares patent, moist mucous membranes  Cardiovascular: regular rate and rhythm. no murmurs, rubs, or gallops. no edema  Respiratory: breathing comfortably, symmetric chest rise, clear to auscultation bilaterally. No wheezes, rales, or rhonchi.  Neuro: alert and oriented to time, place, and person. Normal gait  Psych: normal mood and affect     Result Review :   The following data was reviewed by: Sam Hampton Jr, MD on 12/17/2024:  Common labs          10/14/2024    00:55 10/14/2024    07:37 10/15/2024    05:50 10/21/2024    16:12   Common Labs   Glucose 188  152  157  85    BUN 23  23  24  30    Creatinine 1.44  1.25  1.30  1.13    Sodium 138  135  135  137    Potassium 4.2  4.2  4.1  4.6    Chloride 102  99  103  98    Calcium 8.9  8.6  8.7  9.6    Albumin 3.3   3.1  3.8    Total Bilirubin 0.4   0.2  0.3    Alkaline Phosphatase 196   161  108    AST (SGOT) 147   96  41    ALT (SGPT) 84   68  56    WBC  10.71  16.23  11.39    Hemoglobin  11.7  11.3  13.9    Hematocrit  34.3  34.1  41.4    Platelets  193  221  371    Hemoglobin A1C  5.90          Lab Results   Component Value Date    SARSANTIGEN Not Detected 06/18/2024    COVID19 Not Detected 10/13/2024    FLUAAG Not Detected 06/18/2024    FLUBAG Not Detected 06/18/2024    RSV Not Detected 10/13/2024    INR 1.0 01/11/2023    BILIRUBINUR Negative 10/13/2024        Assessment and Plan    Diagnoses and all orders for this visit:    1. Primary hypertension (Primary)  Comments:  Well-controlled on current regimen.  Goal BP less than 130/80.  Orders:  -     losartan (COZAAR) 25 MG tablet;  Take 1 tablet by mouth Daily.  Dispense: 90 tablet; Refill: 3    2. Mixed hyperlipidemia  Comments:  Continue statin therapy.    3. Insomnia, unspecified type  -     Ambulatory Referral to Sleep Medicine    4. Lung nodule  Comments:  Schedule chest CT to follow-up.    5. Chronic diastolic congestive heart failure  Comments:  Continue Jardiance at current dose.  Continue ARB.  Holding beta-blocker at this time due to COPD.  Orders:  -     losartan (COZAAR) 25 MG tablet; Take 1 tablet by mouth Daily.  Dispense: 90 tablet; Refill: 3    6. Chronic obstructive pulmonary disease, unspecified COPD type  Comments:  Continue Trelegy as well as albuterol as needed.          Medications Discontinued During This Encounter   Medication Reason    fluconazole (Diflucan) 150 MG tablet *Therapy completed    losartan (COZAAR) 25 MG tablet Reorder          Follow Up   Return in about 4 months (around 4/17/2025).  Patient was given instructions and counseling regarding her condition or for health maintenance advice. Please see specific information pulled into the AVS if appropriate.       Sam Hampton Jr, MD  12/17/24  13:27 EST

## 2025-01-03 ENCOUNTER — TELEPHONE (OUTPATIENT)
Dept: INTERNAL MEDICINE | Facility: CLINIC | Age: 80
End: 2025-01-03
Payer: MEDICARE

## 2025-01-03 NOTE — TELEPHONE ENCOUNTER
Spoke with patient. Verified .   Made aware appt is required for any medications.   Verbalized understanding

## 2025-01-03 NOTE — TELEPHONE ENCOUNTER
Caller: Lianne Bardales    Relationship: Self    Best call back number: [phone]    What medication are you requesting: TAMAFLU    What are your current symptoms: COUGH, PATIENTS  JUST TESTED POSITIVE FOR THE FLU     How long have you been experiencing symptoms: ONE DAY     Have you had these symptoms before:    [] Yes  [x] No    Have you been treated for these symptoms before:   [] Yes  [x] No    If a prescription is needed, what is your preferred pharmacy and phone number: [facility] [phone] [facility] [phone]

## 2025-01-09 DIAGNOSIS — E78.49 OTHER HYPERLIPIDEMIA: ICD-10-CM

## 2025-01-10 RX ORDER — ROSUVASTATIN CALCIUM 40 MG/1
40 TABLET, COATED ORAL
Qty: 90 TABLET | Refills: 3 | OUTPATIENT
Start: 2025-01-10

## 2025-01-27 ENCOUNTER — OFFICE VISIT (OUTPATIENT)
Dept: SLEEP MEDICINE | Facility: HOSPITAL | Age: 80
End: 2025-01-27
Payer: MEDICARE

## 2025-01-27 VITALS
HEART RATE: 87 BPM | WEIGHT: 130.1 LBS | DIASTOLIC BLOOD PRESSURE: 57 MMHG | OXYGEN SATURATION: 92 % | SYSTOLIC BLOOD PRESSURE: 109 MMHG | HEIGHT: 64 IN | BODY MASS INDEX: 22.21 KG/M2

## 2025-01-27 DIAGNOSIS — G47.19 EXCESSIVE DAYTIME SLEEPINESS: Primary | ICD-10-CM

## 2025-01-27 DIAGNOSIS — I50.30 DIASTOLIC HEART FAILURE, UNSPECIFIED HF CHRONICITY: ICD-10-CM

## 2025-01-27 DIAGNOSIS — R06.83 SNORING: ICD-10-CM

## 2025-01-27 DIAGNOSIS — J43.9 PULMONARY EMPHYSEMA, UNSPECIFIED EMPHYSEMA TYPE: ICD-10-CM

## 2025-01-27 PROCEDURE — 3074F SYST BP LT 130 MM HG: CPT | Performed by: STUDENT IN AN ORGANIZED HEALTH CARE EDUCATION/TRAINING PROGRAM

## 2025-01-27 PROCEDURE — 3078F DIAST BP <80 MM HG: CPT | Performed by: STUDENT IN AN ORGANIZED HEALTH CARE EDUCATION/TRAINING PROGRAM

## 2025-01-27 PROCEDURE — 99204 OFFICE O/P NEW MOD 45 MIN: CPT | Performed by: STUDENT IN AN ORGANIZED HEALTH CARE EDUCATION/TRAINING PROGRAM

## 2025-01-27 PROCEDURE — 1159F MED LIST DOCD IN RCRD: CPT | Performed by: STUDENT IN AN ORGANIZED HEALTH CARE EDUCATION/TRAINING PROGRAM

## 2025-01-27 PROCEDURE — G0463 HOSPITAL OUTPT CLINIC VISIT: HCPCS

## 2025-01-27 PROCEDURE — 1160F RVW MEDS BY RX/DR IN RCRD: CPT | Performed by: STUDENT IN AN ORGANIZED HEALTH CARE EDUCATION/TRAINING PROGRAM

## 2025-01-27 NOTE — PROGRESS NOTES
[facility] SLEEP MEDICINE   [address]  [address]  [place] [address]  [phone]     Referring physician/provider: Sam Hampton*   PCP: Sam Hampton Jr., MD    Type of service: Initial Sleep Medicine Consult.  Date of service: 1/27/2025        Sleep Clinic Initial Consult Visit      Patient or patient representative verbalized consent for the use of Ambient Listening during the visit with  Clovis Daugherty DO for chart documentation. 1/27/2025  16:41 EST    HISTORY OF PRESENT ILLNESS    Chief Complaint: insomnia   Lianne Bardales is a 79 y.o. female that was seen today, on 1/27/2025 at [facility]IC[facility] for insomnia.     History of Present Illness  The patient is a 79-year-old female who presents for evaluation of sleep issues. She is accompanied by her daughter.  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  She is on 2 L oxygen only at night and has been on this for about 6 weeks.  She had an pulse oximetry test on November 4, 2024 which showed 2 hours 46 minutes of time with oxygen saturation less than 88% with lowest oxygen saturation of 82%.  She was referred to our clinic by Dr. Hampton, who initiated oxygen therapy approximately 6 weeks ago due to concerns about nocturnal hypoxemia.   She has a longstanding history of sleep disturbances, characterized by frequent awakenings, but notes a slight improvement since starting oxygen therapy. She typically falls asleep on the couch around 11:30 PM, takes about an hour to fall asleep, wakes up twice to use the bathroom, and sometimes wakes up between 3:00 and 4:00 AM. She gets up at 5am.  She only uses her oxygen when lying in bed. She takes 1 to 2 short naps during the day, usually lasting 10 to 15 minutes. She has been experiencing these sleep  issues for several years, with a noted improvement since starting oxygen therapy. She occasionally snores and has been observed by her  to stop breathing during sleep. She has experienced episodes of waking up short of breath, although these have become less frequent. She has found that straightening her neck improves her breathing.   She has a pulmonary physician, Dr. Mcgovern, whom she last consulted in October 2024.  She has a history of central sleep apnea, diagnosed through 2 sleep studies conducted over a decade ago. The first study revealed central sleep apnea, but a subsequent study a year later did not confirm this diagnosis. She experienced excessive daytime sleepiness, particularly while working or driving, leading to the prescription of Provigil. However, no treatment was initiated for her nighttime symptoms.  She has a history of smoking, having quit 30 years ago after a 27-year habit of smoking 2 packs a day. She has a history of emphysema and COPD.     Weekend sleep schedule  same    Medical history  HFpEF  COPD  Lung lobectomy  She has had 2 exacerbations of COPD and pneumonia, one of which required hospitalization in October 2024.SOCIAL HISTORY  She smoked for approximately 27 years and quit 30 years ago. She smoked 2 packs a day.    FAMILY HISTORY  There is no family history of sleep apnea that she is aware of.    MEDICATIONS  Jardiance, losartan, spironolactone, aspirin, albuterol.    History of Sleep Study before: Yes 10 or more years ago: central sleep apnea  ESS today:9  Occupation: retired    Symptoms:   Have you ever awakened gasping for breath, coughing, choking:  [x]   Yes     []   No   Witnessed apneas: [x]   Yes     []   No   Loud Snoring: [x]   Yes     []   No   Do you drive a commercial vehicle:  []   Yes     [x]   No   History of any near accidents while driving due to sleepiness in the past 5 years: []   Yes     [x]   No       ROS:    Negative for:  Symptoms consistent with the  "clinical diagnosis of Restless legs syndrome  Symptoms consistent with the clinical diagnosis of Cataplexy   Sleep walking   See scanned media document for other sleep related questions      Allergies: Sulfamethoxazole-trimethoprim       Objective   Vital Signs:   Vitals:    01/27/25 1100   BP: 109/57   Pulse: 87   SpO2: 92%   Weight: 59 kg (130 lb 1.6 oz)   Height: 162.6 cm (64\")   PainSc: 0-No pain     Body mass index is 22.33 kg/m².      PHYSICAL EXAM  CONSTITUTIONAL:  Non-toxic, In no overt distress   ENT: Mallampati class 2-3  NECK:Neck Circumference: 12.5 inches  RESPIRATORY SYSTEM: Breathing appears nonlabored   CARDIOVASULAR SYSTEM: Regular rate  NEUROLOGICAL SYSTEM: answers questions appropriately      Result Review   The following data was reviewed by: Clovis Daugherty DO on 01/27/2025:  [x]  Medications reviewed        ASSESSMENT/PLAN  Diagnoses and all orders for this visit:    1. Excessive daytime sleepiness (Primary)  -     Cancel: Polysomnography 4 or More Parameters; Future  -     Polysomnography 4 or More Parameters; Future    2. Pulmonary emphysema, unspecified emphysema type  -     Cancel: Polysomnography 4 or More Parameters; Future  -     Polysomnography 4 or More Parameters; Future    3. Diastolic heart failure, unspecified HF chronicity  -     Cancel: Polysomnography 4 or More Parameters; Future  -     Polysomnography 4 or More Parameters; Future    4. Snoring  -     Cancel: Polysomnography 4 or More Parameters; Future  -     Polysomnography 4 or More Parameters; Future      Assessment & Plan  1. Sleep disturbances, nocturnal hypoxemia. She is currently on 2L oxygen nocturnally. She has COPD and CHF.  She has plans for follow up with her pulmonologist later this year.  Sleep-disordered breathing could be contributing to her nocturnal awakenings. Her oxygen saturation levels have been observed to decrease during the night, with a recorded low of 82 percent.   An in-lab sleep test will be " ordered to further investigate her condition.  If the sleep study indicates obstructive sleep apnea, CPAP therapy will be recommended as the initial treatment approach. If she is unable to tolerate CPAP, alternative treatment options will be explored.  We will see her back in clinic after sleep study results have been obtained.    BMI is within normal parameters. No other follow-up for BMI required.    I have also discussed with the patient the following  Untreated PERLITA is associated with increased risks of stroke, heart attack, heart failure, motor vehicle accidents.   Recommended no driving or operating machinery if feeling sleepy. Discussed options of taking naps and getting rides with other people.   Generally most people need about 7 to 9 hours of sleep per night.       FOLLOW UP  No follow-ups on file.  Patient was given instructions and counseling regarding her condition or for health maintenance advice. Please see specific information pulled into the AVS if appropriate.         Patient's questions were answered.  Thank you for allowing me to participate in the care of this patient.  Dictated Utilizing Dragon Dictation. Please note that portions of this note were completed with a voice recognition program. Part of this note may be an electronic transcription/translation of spoken language to printed text using the Dragon Dictation System.      [facility] SLEEP MEDICINE   Clovis Daugherty DO  01/27/25  16:52 EST

## 2025-02-06 ENCOUNTER — TELEPHONE (OUTPATIENT)
Dept: INTERNAL MEDICINE | Facility: CLINIC | Age: 80
End: 2025-02-06
Payer: MEDICARE

## 2025-02-06 NOTE — TELEPHONE ENCOUNTER
Caller: AYLIN [facility]    Relationship to patient: Other    Best call back number: [phone]     Patient is needing: CALLER WANTING TO CONFIRM UPCOMING APPOINTMENT THAT PATIENT HAS SCHEDULED AND ALSO CONFIRM PATIENT'S MOST RECENT BLOOD PRESSURE READING.

## 2025-03-04 ENCOUNTER — HOSPITAL ENCOUNTER (OUTPATIENT)
Dept: CT IMAGING | Facility: HOSPITAL | Age: 80
Discharge: HOME | End: 2025-03-04
Admitting: INTERNAL MEDICINE
Payer: MEDICARE

## 2025-03-04 DIAGNOSIS — R59.0 MEDIASTINAL ADENOPATHY: ICD-10-CM

## 2025-03-04 LAB
CREAT BLDA-MCNC: 1.2 MG/DL (ref 0.6–1.3)
EGFRCR SERPLBLD CKD-EPI 2021: 46.4 ML/MIN/1.73

## 2025-03-04 PROCEDURE — 82565 ASSAY OF CREATININE: CPT

## 2025-03-04 PROCEDURE — 25510000001 IOPAMIDOL PER 1 ML: Performed by: INTERNAL MEDICINE

## 2025-03-04 PROCEDURE — 71260 CT THORAX DX C+: CPT

## 2025-03-04 RX ORDER — IOPAMIDOL 755 MG/ML
100 INJECTION, SOLUTION INTRAVASCULAR
Status: COMPLETED | OUTPATIENT
Start: 2025-03-04 | End: 2025-03-04

## 2025-03-04 RX ADMIN — IOPAMIDOL 100 ML: 755 INJECTION, SOLUTION INTRAVENOUS at 10:27

## 2025-03-10 ENCOUNTER — RESULTS FOLLOW-UP (OUTPATIENT)
Dept: CT IMAGING | Facility: HOSPITAL | Age: 80
End: 2025-03-10
Payer: MEDICARE

## 2025-03-14 DIAGNOSIS — I50.32 CHRONIC DIASTOLIC CONGESTIVE HEART FAILURE: ICD-10-CM

## 2025-03-14 RX ORDER — SPIRONOLACTONE 50 MG/1
50 TABLET, FILM COATED ORAL DAILY
Qty: 90 TABLET | Refills: 0 | Status: SHIPPED | OUTPATIENT
Start: 2025-03-14

## 2025-03-19 NOTE — PROGRESS NOTES
Primary Care Provider  Sam Hampton Jr., MD   Referring Provider  No ref. provider found    Patient Complaint  Follow-up, Pneumonia, Cough (Productive whiteish foamy ), and Shortness of Breath (Always short of breath has got worse )    Patient or patient representative verbalized consent for the use of Ambient Listening during the visit with  JOSÉ MIGUEL Connell for chart documentation. 3/20/2025  10:07 EDT      Subjective       History of Presenting Illness  Lianne Bardales is a pleasant 79 y.o. female  of  Dr. 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  Patient was last seen 10/29/2024.    Patient had a CT of chest with contrast 3/4/2025 which demonstrated emphysema, no new or suspicious lung nodule evident.    History of Present Illness  The patient presents for evaluation of a persistent cough and kidney disease. She is accompanied by her daughter.    She has been experiencing recurrent illnesses throughout the winter, including a recent episode of pneumonia. She was hospitalized in 10/2024 due to pneumonia, which was managed with antibiotics and steroids under the care of Dr. Lopes. Despite this treatment, she required additional courses of steroids and antibiotics in 01/2025 and 03/2025. A CT scan performed on 03/04/2025 revealed basilar scarring, indicative of pneumonia. She was on antibiotics and steroids at the time of the CT scan, which initially improved her condition. However, her symptoms worsened following the scan, prompting a visit to [facility] where an x-ray was conducted. She reports that her lungs feel improved, but she continues to experience a cough. She has completed her course of levofloxacin and steroids and continues to use Trelegy 100. She has attempted to manage her cough with Mucinex, but found it ineffective. She  has previously tried Tessalon Perles without success years ago. She has been using a flutter valve, which occasionally provides relief. She has never used Singulair. She uses oxygen at night and reports an improvement in her energy levels and a decrease in shortness of breath during ambulation. She has an upcoming appointment with Dr. Hampton on 2025.    Her mother reports that her kidney function has been declining, potentially due to dehydration during illness. She has not had any recent blood work to assess her kidney and liver function.    She has no history of ear problems, but reports itching in her ears. She uses Q-tips for ear cleaning.    She takes Allegra daily for allergies.    MEDICATIONS  Current: Trelegy 100, Allegra.  Discontinued: Levofloxacin.         At present time patient denies wheezing, headaches, chest pain, weight loss or hemoptysis. Patient denies fevers, chills and night sweats. Lianne Bardales is able to perform ADLs.      I have personally reviewed the review of systems, past family, social, medical and surgical histories; and agree with their findings.      Review of Systems   Constitutional:  Positive for fatigue.   HENT: Negative.     Respiratory:  Positive for cough and shortness of breath.    Cardiovascular: Negative.    Musculoskeletal: Negative.    Neurological: Negative.    Psychiatric/Behavioral: Negative.           Family History   Problem Relation Age of Onset   • Cancer Mother    • Diabetes Mother    • Heart failure Mother    • Cancer Brother         Social History     Socioeconomic History   • Marital status:    Tobacco Use   • Smoking status: Former     Current packs/day: 0.00     Average packs/day: 2.0 packs/day for 34.0 years (68.0 ttl pk-yrs)     Types: Cigarettes     Start date:      Quit date: 1990     Years since quittin.2     Passive exposure: Past   • Smokeless tobacco: Never   Vaping Use   • Vaping status: Never Used   Substance and  Sexual Activity   • Alcohol use: Yes     Alcohol/week: 2.0 standard drinks of alcohol     Types: 2 Shots of liquor per week   • Drug use: Defer   • Sexual activity: Defer        Past Medical History:   Diagnosis Date   • Acid reflux    • Arthritis    • Asthma    • COPD (chronic obstructive pulmonary disease)    • History of percutaneous coronary intervention    • Hypertension    • Insomnia    • Lung nodule         Immunization History   Administered Date(s) Administered   • Arexvy (RSV, Adults 60+ yrs) 10/29/2024   • COVID-19 (PFIZER) Purple Cap Monovalent 01/21/2021, 02/11/2021, 11/15/2021   • Fluad Quad 65+ 10/18/2022, 10/18/2023   • Fluzone High-Dose 65+YRS 08/18/2016, 10/29/2024   • Fluzone High-Dose 65+yrs 10/30/2020, 10/18/2021   • Fluzone Quad >6mos (Multi-dose) 02/06/2018, 11/01/2018, 11/07/2019   • Hepatitis A 11/01/2018, 07/17/2019   • Influenza TIV (IM) 11/01/2020   • Pneumococcal Conjugate 13-Valent (PCV13) 07/12/2017   • Pneumococcal Conjugate 20-Valent (PCV20) 02/06/2023   • Pneumococcal Polysaccharide (PPSV23) 11/01/2021   • Shingrix 07/17/2019, 11/07/2019   • Zostavax 07/12/2017, 11/01/2018, 11/07/2019       Allergies   Allergen Reactions   • Sulfamethoxazole-Trimethoprim GI Intolerance          Current Outpatient Medications:   •  albuterol sulfate  (90 Base) MCG/ACT inhaler, USE 2 INHALATIONS BY MOUTH EVERY 4 HOURS AS NEEDED FOR WHEEZING  OR SHORTNESS OF AIR (Patient taking differently: Inhale 2 puffs Every 4 (Four) Hours As Needed for Wheezing or Shortness of Air.), Disp: 108 g, Rfl: 11  •  Aspirin Low Dose 81 MG EC tablet, Take 1 tablet by mouth Daily., Disp: , Rfl:   •  empagliflozin (Jardiance) 10 MG tablet tablet, Take 1 tablet by mouth Daily., Disp: 90 tablet, Rfl: 3  •  esomeprazole (nexIUM) 40 MG capsule, TAKE 1 CAPSULE BY MOUTH IN THE  MORNING BEFORE BREAKFAST, Disp: 90 capsule, Rfl: 3  •  losartan (COZAAR) 25 MG tablet, Take 1 tablet by mouth Daily., Disp: 90 tablet, Rfl: 3  •   "magnesium 30 MG tablet, Take 1 tablet by mouth 2 (Two) Times a Day., Disp: , Rfl:   •  rosuvastatin (CRESTOR) 40 MG tablet, Take 0.5 tablets by mouth every night at bedtime., Disp: , Rfl:   •  spironolactone (ALDACTONE) 50 MG tablet, TAKE 1 TABLET BY MOUTH DAILY, Disp: 90 tablet, Rfl: 0  •  Trelegy Ellipta 100-62.5-25 MCG/ACT inhaler, USE 1 INHALATION BY MOUTH ONCE  DAILY AT THE SAME TIME EACH DAY (Patient taking differently: Inhale 1 puff Daily.), Disp: 180 each, Rfl: 3  •  benzonatate (TESSALON) 200 MG capsule, Take 1 capsule by mouth 3 (Three) Times a Day As Needed for Cough., Disp: 42 capsule, Rfl: 2         Vital Signs   /76 (BP Location: Right arm, Patient Position: Sitting, Cuff Size: Adult)   Pulse 86   Temp 97.8 °F (36.6 °C)   Resp 16   Ht 162.6 cm (64\")   Wt 59 kg (130 lb)   SpO2 94%   BMI 22.31 kg/m²       Objective     Physical Exam  Vitals reviewed.   Constitutional:       General: She is not in acute distress.     Appearance: Normal appearance. She is not ill-appearing.   HENT:      Head: Normocephalic and atraumatic.      Nose: Nose normal.      Mouth/Throat:      Mouth: Mucous membranes are moist.      Pharynx: Oropharynx is clear.   Eyes:      Extraocular Movements: Extraocular movements intact.      Conjunctiva/sclera: Conjunctivae normal.      Pupils: Pupils are equal, round, and reactive to light.   Cardiovascular:      Rate and Rhythm: Normal rate and regular rhythm.      Pulses: Normal pulses.      Heart sounds: Normal heart sounds.   Pulmonary:      Effort: Pulmonary effort is normal. No respiratory distress.      Breath sounds: Normal breath sounds. No stridor. No wheezing, rhonchi or rales.   Abdominal:      General: Bowel sounds are normal.   Musculoskeletal:         General: Normal range of motion.      Cervical back: Normal range of motion and neck supple.   Skin:     General: Skin is warm and dry.   Neurological:      Mental Status: She is alert and oriented to person, " place, and time.   Psychiatric:         Behavior: Behavior normal.         Physical Exam  The ear canals are slightly narrow but appear healthy. There is no redness or fluid. One ear has significant wax buildup further back, obscuring the view of the tympanic membrane.  Lungs are clear.  Heart is regular.         Results Review  I have personally reviewed the prior office notes, hospital records, labs, and diagnostics.  CT Chest With Contrast Diagnostic [DGQ962] (Order [id number])  Order  Status: Final result     Study Notes     Emily Harrell on 3/4/2025 10:26 AM EST   Follow up. Cough. History of lung fungus with surgery.     Appointment Information    PACS Images     Radiology Images  Study Result    Narrative & Impression   CT CHEST W CONTRAST DIAGNOSTIC     Date of Exam: 3/4/2025 10:09 AM EST     Indication: Mediastinal adenopathy.     Comparison: CT chest 10/14/2024, CT chest 8/31/2023     Technique: Axial CT images were obtained of the chest after the uneventful intravenous administration of iodinated contrast.  Reconstructed coronal and sagittal images were also obtained. Automated exposure control and iterative construction methods were   used.        Findings:  Lung window images reveal moderate emphysema. Surgical staples are seen in the right upper lobe. Scarring is stable. Alveolar airspace disease seen in the right middle lobe on 10/14/2024 has resolved. Mild scarring at the lung bases is stable.     Mediastinal windows reveal no mediastinal,, hilar, or axillary adenopathy. Extensive coronary artery calcifications and/or coronary artery stents are evident.     The liver is of diffusely diminished density consistent with mild fatty infiltration. The gallbladder is absent, surgical clips are seen in the gallbladder bed. Stable scarring is seen in the right kidney.     Degenerative spurring is seen in the thoracic spine.     IMPRESSION:  Impression:  CT scan of the chest with IV contrast demonstrating  emphysema.     Postsurgical changes are seen in the right upper lobe.     No new or suspicious lung nodule is evident.     Coronary artery calcifications.     Fatty liver.     Stable right renal scarring.           Electronically Signed: Kwesi Ball MD    3/6/2025 3:03 PM EST    Workstation ID: [id number]     Results  Laboratory Studies  Kidney function test shows abnormal results.    Imaging  CT scan shows stable scarring in the base of the lungs, no swollen lymph nodes in the lungs, and moderate emphysema.          Assessment         Patient Active Problem List   Diagnosis   • Acid reflux   • Anemia, iron deficiency   • Asthma   • Gall stones   • Chronic dyspnea   • History of fungal infection   • Lung nodule   • Tobacco abuse, in remission   • Primary hypertension   • Mixed hyperlipidemia   • Coronary artery disease of native artery of native heart with stable angina pectoris   • Intermittent right-sided chest pain   • Chronic diastolic congestive heart failure   • Hypoxia   • UTI (urinary tract infection), bacterial        Plan     Diagnoses and all orders for this visit:    1. Pulmonary emphysema, unspecified emphysema type (Primary)  -     Complete PFT - Pre & Post Bronchodilator; Future  -     Respiratory Culture - Sputum, Cough; Future  -     AFB Culture - , Cough; Future  -     Ambulatory Referral to Nephrology    2. Tobacco abuse, in remission  Comments:  Quit smoking in 1990  Orders:  -     Complete PFT - Pre & Post Bronchodilator; Future  -     Respiratory Culture - Sputum, Cough; Future  -     AFB Culture - , Cough; Future  -     Ambulatory Referral to Nephrology    3. History of lobectomy of lung  -     Complete PFT - Pre & Post Bronchodilator; Future  -     Respiratory Culture - Sputum, Cough; Future  -     AFB Culture - , Cough; Future  -     Ambulatory Referral to Nephrology    4. Abnormal results of kidney function studies  -     Complete PFT - Pre & Post Bronchodilator; Future  -     Respiratory  Culture - Sputum, Cough; Future  -     AFB Culture - , Cough; Future  -     Ambulatory Referral to Nephrology    Other orders  -     benzonatate (TESSALON) 200 MG capsule; Take 1 capsule by mouth 3 (Three) Times a Day As Needed for Cough.  Dispense: 42 capsule; Refill: 2         Assessment & Plan  1. Persistent cough.  The CT scan did not reveal any mucous plugs. The cough could be attributed to a combination of allergies and emphysema. A pulmonary function test will be ordered to assess changes in lung functionality since the last test in 2022. A sputum culture will be ordered to identify potential fungal or bacterial infections. She is advised to use Mucinex twice daily for 5 days to facilitate expectoration. Samples of Mucinex will be provided. A prescription for Tessalon Perles 200 mg every 8 hours as needed for cough will be provided. She is also advised to continue using the flutter valve up to 10 times daily. If the Mucinex, allergy medication, and flutter valve do not alleviate the cough, a bronchoscopy may be considered.  Samples of Trelegy 200 provided today to evaluate response to increasing dose as patient is currently on Trelegy 100.    2. Kidney disease.  Her kidney function has been declining over the past 5 months. A referral to a nephrologist will be made for further evaluation and management.    3. Earwax buildup.  She is advised to use peroxide to soften the earwax and then rinse it out with warm water using a bulb syringe.    4. Allergies.  She is advised to continue taking cetirizine daily for allergies.    Follow-up  The patient will follow up in 2 months.      Smoking status:  reports that she quit smoking about 35 years ago. Her smoking use included cigarettes. She started smoking about 69 years ago. She has a 68 pack-year smoking history. She has been exposed to tobacco smoke. She has never used smokeless tobacco.    Vaccination status: Reviewed  Immunization History   Administered Date(s)  Administered   • Arexvy (RSV, Adults 60+ yrs) 10/29/2024   • COVID-19 (PFIZER) Purple Cap Monovalent 01/21/2021, 02/11/2021, 11/15/2021   • Fluad Quad 65+ 10/18/2022, 10/18/2023   • Fluzone High-Dose 65+YRS 08/18/2016, 10/29/2024   • Fluzone High-Dose 65+yrs 10/30/2020, 10/18/2021   • Fluzone Quad >6mos (Multi-dose) 02/06/2018, 11/01/2018, 11/07/2019   • Hepatitis A 11/01/2018, 07/17/2019   • Influenza TIV (IM) 11/01/2020   • Pneumococcal Conjugate 13-Valent (PCV13) 07/12/2017   • Pneumococcal Conjugate 20-Valent (PCV20) 02/06/2023   • Pneumococcal Polysaccharide (PPSV23) 11/01/2021   • Shingrix 07/17/2019, 11/07/2019   • Zostavax 07/12/2017, 11/01/2018, 11/07/2019        Medications personally reviewed    Follow Up  Return in about 2 months (around 5/20/2025) for with Dr. Starks or Annabelle.    Patient was given instructions and counseling regarding her condition or for health maintenance advice. Please see specific information pulled into the AVS if appropriate.     I spent 32 minutes caring for Lianne Bardales on this date of service. This time includes time spent by me in the following activities:preparing for the visit, reviewing tests, obtaining and/or reviewing a separately obtained history, performing a medically appropriate examination and/or evaluation, counseling and educating the patient/family/caregiver, ordering medications, tests, or procedures, documenting information in the medical record, independently interpreting results and communicating that information with the patient/family/caregiver and answered questions family members, discuss medications.

## 2025-03-19 NOTE — PROGRESS NOTES
Chief Complaint  Hypertension    Subjective          Lianne Ryanne Bardales presents to [facility]  History of Present Illness  History of Present Illness  The patient presents for evaluation of pneumonia, postnasal drip, elevated IgE level, fatty liver, nausea, and osteopenia.    She has a history of recurrent pneumonia, with 3 episodes since October 2024. The first episode occurred in early October 2024, necessitating hospitalization and subsequent treatment with antibiotics and steroids. A second episode in January 2025 required 2 additional courses of antibiotics and steroids administered at an urgent care facility. Most recently, she developed pneumonia following a trip to [place], where she initiated self-treatment with her usual regimen of antibiotics and steroids. However, her condition worsened, prompting another visit to urgent care where she was prescribed levofloxacin and a Z-Devin, along with another course of steroids. She has since completed these medications and reports feeling better. She recently saw a pulmonologist for a follow-up visit, during which her Trelegy dosage was increased to 200 mg. She is scheduled to see the pulmonologist again in May 2025. A PFT has been ordered but not yet scheduled. She uses oxygen at night while in bed but not when she moves to the couch or gets up in the morning. She has been taking Mucinex for the past 7 days but plans to discontinue it today. She has a history of elevated IgE levels is considering stronger medications for her COPD.    Despite this, she continues to experience a persistent cough, which she attributes to postnasal drip rather than a pulmonary issue. She is currently managing her symptoms with Nasacort and Zyrtec and is considering the addition of Singulair to her regimen.    She has expressed concern about the potential impact of prolonged Nexium use on her health. She was previously advised to increase  "her Nexium dosage to twice daily due to severe nausea but has only been taking it once daily. She reports that her nausea has significantly improved since restarting Nexium.    She has a history of poor kidney function, which has shown some improvement over the past year. She has an upcoming appointment with a nephrologist in May 2025.    She has been diagnosed with fatty liver based on a recent CT scan.    She has a sleep study scheduled for May 2025.         Current Outpatient Medications   Medication Instructions    albuterol sulfate  (90 Base) MCG/ACT inhaler USE 2 INHALATIONS BY MOUTH EVERY 4 HOURS AS NEEDED FOR WHEEZING  OR SHORTNESS OF AIR    Aspirin Low Dose 81 mg, Daily    benzonatate (TESSALON) 200 mg, Oral, 3 Times Daily PRN    cetirizine (ZYRTEC) 10 mg, Daily    empagliflozin (JARDIANCE) 10 mg, Oral, Daily    esomeprazole (NEXIUM) 40 mg, Oral, Every Morning Before Breakfast    Fluticasone-Umeclidin-Vilant (Trelegy Ellipta) 200-62.5-25 MCG/ACT inhaler 1 puff, Inhalation, Daily - RT    losartan (COZAAR) 25 mg, Oral, Daily    magnesium 30 mg, 2 Times Daily    montelukast (SINGULAIR) 10 mg, Oral, Nightly    rosuvastatin (CRESTOR) 20 mg, Oral, Every Night at Bedtime    spironolactone (ALDACTONE) 50 mg, Oral, Daily       The following portions of the patient's history were reviewed and updated as appropriate: allergies, current medications, past family history, past medical history, past social history, past surgical history, and problem list.    Objective   Vital Signs:   /71 (BP Location: Left arm, Patient Position: Sitting, Cuff Size: Large Adult)   Pulse 90   Temp 97.3 °F (36.3 °C) (Temporal)   Ht 162.6 cm (64.02\")   Wt 57.7 kg (127 lb 3.2 oz)   SpO2 91%   BMI 21.82 kg/m²     BP Readings from Last 3 Encounters:   03/25/25 107/71   03/20/25 109/76   01/27/25 109/57     Wt Readings from Last 3 Encounters:   03/25/25 57.7 kg (127 lb 3.2 oz)   03/20/25 59 kg (130 lb)   01/27/25 59 kg (130 lb " 1.6 oz)     Physical Exam   Appearance: No acute distress, well-nourished  Head: normocephalic, atraumatic  Eyes: extraocular movements intact, no scleral icterus, no conjunctival injection  Ears, Nose, and Throat: external ears normal, nares patent, moist mucous membranes  Cardiovascular: regular rate and rhythm. no murmurs, rubs, or gallops. no edema  Respiratory: breathing comfortably, symmetric chest rise, clear to auscultation bilaterally. No wheezes, rales, or rhonchi.  Neuro: alert and oriented to time, place, and person. Normal gait  Psych: normal mood and affect       Result Review :   The following data was reviewed by: Sam Hampton Jr, MD on 03/25/2025:  Common labs          10/21/2024    16:12 3/4/2025    10:25 3/25/2025    12:23   Common Labs   Glucose 85   82    BUN 30   20    Creatinine 1.13  1.20  1.00    Sodium 137   136    Potassium 4.6   4.5    Chloride 98   101    Calcium 9.6   9.3    Albumin 3.8   3.8    Total Bilirubin 0.3   0.3    Alkaline Phosphatase 108   80    AST (SGOT) 41   28    ALT (SGPT) 56   18    WBC 11.39   7.19    Hemoglobin 13.9   13.5    Hematocrit 41.4   40.4    Platelets 371   288    Total Cholesterol   165    Triglycerides   83    HDL Cholesterol   67    LDL Cholesterol    83    Hemoglobin A1C   6.00        Lab Results   Component Value Date    SARSANTIGEN Not Detected 06/18/2024    COVID19 Not Detected 10/13/2024    FLUAAG Not Detected 06/18/2024    FLUBAG Not Detected 06/18/2024    RSV Not Detected 10/13/2024    INR 1.0 01/11/2023    BILIRUBINUR Negative 10/13/2024          Assessment and Plan      Assessment & Plan  1. Pneumonia.  She has had three episodes of pneumonia since October, requiring multiple rounds of antibiotics and steroids. The potential benefits of biologics such as Xolair or Dupixent were discussed, which could reduce the frequency of pneumonia flare-ups. A pulmonary function test (PFT) will be scheduled to assess her lung function. An IgE level  test will also be conducted. She will start on Singulair (montelukast) to help prevent future pneumonias and manage her allergies. The black box warning for behavioral changes was discussed, although it is more common in children.    2. Postnasal drip.  She reports persistent cough likely due to postnasal drip. She is currently taking Nasacort and Zyrtec. The addition of Singulair may help alleviate these symptoms.    3. Elevated IgE levels.  Her IgE levels were previously elevated. The potential use of biologics like Xolair, which targets IgE, was discussed. An IgE level test will be conducted to reassess her current levels.    4. Fatty liver.  Her liver enzymes have been mildly elevated, but bilirubin levels remain normal, indicating no signs of liver dysfunction. She is advised to continue monitoring her liver function through regular blood tests.    5. Nausea.  She was previously advised to increase Nexium to twice a day due to nausea but has been taking it once a day. Her nausea has improved with the current regimen. She will continue taking Nexium once a day.    6. Osteopenia.  Her last bone density test in July 2024 showed osteopenia but did not meet the criteria for osteoporosis. Continued monitoring of bone density is recommended, especially due to the potential impact of long-term steroid use.    7. Kidney function monitoring.  Her creatinine levels have fluctuated but are currently within the normal range. Continued monitoring of kidney function through regular blood tests is recommended.    8. Sleep study.  She has a sleep study scheduled for May 2025.    Medications Discontinued During This Encounter   Medication Reason    Trelegy Ellipta 100-62.5-25 MCG/ACT inhaler Alternate therapy        Follow Up   Return in about 6 months (around 9/25/2025).  Patient was given instructions and counseling regarding her condition or for health maintenance advice. Please see specific information pulled into the AVS if  appropriate.       Patient or patient representative verbalized consent for the use of Ambient Listening during the visit with  Sam Hampton Jr, MD for chart documentation. 4/9/2025  11:43 EDT    Sam Hampton Jr, MD  04/09/25  10:27 EDT

## 2025-03-20 ENCOUNTER — OFFICE VISIT (OUTPATIENT)
Dept: PULMONOLOGY | Facility: CLINIC | Age: 80
End: 2025-03-20
Payer: MEDICARE

## 2025-03-20 VITALS
HEIGHT: 64 IN | DIASTOLIC BLOOD PRESSURE: 76 MMHG | BODY MASS INDEX: 22.2 KG/M2 | RESPIRATION RATE: 16 BRPM | HEART RATE: 86 BPM | TEMPERATURE: 97.8 F | SYSTOLIC BLOOD PRESSURE: 109 MMHG | WEIGHT: 130 LBS | OXYGEN SATURATION: 94 %

## 2025-03-20 DIAGNOSIS — Z90.2 HISTORY OF LOBECTOMY OF LUNG: ICD-10-CM

## 2025-03-20 DIAGNOSIS — F17.201 TOBACCO ABUSE, IN REMISSION: ICD-10-CM

## 2025-03-20 DIAGNOSIS — R94.4 ABNORMAL RESULTS OF KIDNEY FUNCTION STUDIES: ICD-10-CM

## 2025-03-20 DIAGNOSIS — J43.9 PULMONARY EMPHYSEMA, UNSPECIFIED EMPHYSEMA TYPE: Primary | ICD-10-CM

## 2025-03-20 PROCEDURE — 3078F DIAST BP <80 MM HG: CPT | Performed by: NURSE PRACTITIONER

## 2025-03-20 PROCEDURE — 3074F SYST BP LT 130 MM HG: CPT | Performed by: NURSE PRACTITIONER

## 2025-03-20 PROCEDURE — 99214 OFFICE O/P EST MOD 30 MIN: CPT | Performed by: NURSE PRACTITIONER

## 2025-03-20 PROCEDURE — 1159F MED LIST DOCD IN RCRD: CPT | Performed by: NURSE PRACTITIONER

## 2025-03-20 PROCEDURE — 1160F RVW MEDS BY RX/DR IN RCRD: CPT | Performed by: NURSE PRACTITIONER

## 2025-03-20 RX ORDER — CEPHALEXIN 500 MG/1
1 CAPSULE ORAL EVERY 12 HOURS SCHEDULED
COMMUNITY
Start: 2025-03-09 | End: 2025-03-20

## 2025-03-20 RX ORDER — BENZONATATE 200 MG/1
200 CAPSULE ORAL 3 TIMES DAILY PRN
Qty: 42 CAPSULE | Refills: 2 | Status: SHIPPED | OUTPATIENT
Start: 2025-03-20

## 2025-03-20 RX ORDER — PREDNISONE 10 MG/1
1 TABLET ORAL EVERY 12 HOURS SCHEDULED
COMMUNITY
Start: 2025-03-09 | End: 2025-03-20

## 2025-03-20 RX ORDER — FLUTICASONE FUROATE, UMECLIDINIUM BROMIDE AND VILANTEROL TRIFENATATE 200; 62.5; 25 UG/1; UG/1; UG/1
1 POWDER RESPIRATORY (INHALATION)
Qty: 1 EACH | Refills: 0 | COMMUNITY
Start: 2025-03-20 | End: 2025-03-21

## 2025-03-20 RX ORDER — LEVOFLOXACIN 500 MG/1
1 TABLET, FILM COATED ORAL DAILY
COMMUNITY
Start: 2025-03-09 | End: 2025-03-20

## 2025-03-25 ENCOUNTER — OFFICE VISIT (OUTPATIENT)
Dept: INTERNAL MEDICINE | Facility: CLINIC | Age: 80
End: 2025-03-25
Payer: MEDICARE

## 2025-03-25 VITALS
DIASTOLIC BLOOD PRESSURE: 71 MMHG | SYSTOLIC BLOOD PRESSURE: 107 MMHG | HEIGHT: 64 IN | HEART RATE: 90 BPM | BODY MASS INDEX: 21.72 KG/M2 | TEMPERATURE: 97.3 F | OXYGEN SATURATION: 91 % | WEIGHT: 127.2 LBS

## 2025-03-25 DIAGNOSIS — I10 PRIMARY HYPERTENSION: Primary | ICD-10-CM

## 2025-03-25 DIAGNOSIS — J45.909 ASTHMA, UNSPECIFIED ASTHMA SEVERITY, UNSPECIFIED WHETHER COMPLICATED, UNSPECIFIED WHETHER PERSISTENT: ICD-10-CM

## 2025-03-25 DIAGNOSIS — E78.2 MIXED HYPERLIPIDEMIA: ICD-10-CM

## 2025-03-25 DIAGNOSIS — R73.02 IMPAIRED GLUCOSE TOLERANCE: ICD-10-CM

## 2025-03-25 DIAGNOSIS — I25.118 CORONARY ARTERY DISEASE OF NATIVE ARTERY OF NATIVE HEART WITH STABLE ANGINA PECTORIS: ICD-10-CM

## 2025-03-25 DIAGNOSIS — I50.32 CHRONIC DIASTOLIC CONGESTIVE HEART FAILURE: ICD-10-CM

## 2025-03-25 LAB
ALBUMIN SERPL-MCNC: 3.8 G/DL (ref 3.5–5.2)
ALBUMIN/GLOB SERPL: 1.2 G/DL
ALP SERPL-CCNC: 80 U/L (ref 39–117)
ALT SERPL W P-5'-P-CCNC: 18 U/L (ref 1–33)
ANION GAP SERPL CALCULATED.3IONS-SCNC: 13 MMOL/L (ref 5–15)
AST SERPL-CCNC: 28 U/L (ref 1–32)
BASOPHILS # BLD AUTO: 0.05 10*3/MM3 (ref 0–0.2)
BASOPHILS NFR BLD AUTO: 0.7 % (ref 0–1.5)
BILIRUB SERPL-MCNC: 0.3 MG/DL (ref 0–1.2)
BUN SERPL-MCNC: 20 MG/DL (ref 8–23)
BUN/CREAT SERPL: 20 (ref 7–25)
CALCIUM SPEC-SCNC: 9.3 MG/DL (ref 8.6–10.5)
CHLORIDE SERPL-SCNC: 101 MMOL/L (ref 98–107)
CHOLEST SERPL-MCNC: 165 MG/DL (ref 0–200)
CO2 SERPL-SCNC: 22 MMOL/L (ref 22–29)
CREAT SERPL-MCNC: 1 MG/DL (ref 0.57–1)
DEPRECATED RDW RBC AUTO: 46.3 FL (ref 37–54)
EGFRCR SERPLBLD CKD-EPI 2021: 57.8 ML/MIN/1.73
EOSINOPHIL # BLD AUTO: 0.09 10*3/MM3 (ref 0–0.4)
EOSINOPHIL NFR BLD AUTO: 1.3 % (ref 0.3–6.2)
ERYTHROCYTE [DISTWIDTH] IN BLOOD BY AUTOMATED COUNT: 12.5 % (ref 12.3–15.4)
GLOBULIN UR ELPH-MCNC: 3.3 GM/DL
GLUCOSE SERPL-MCNC: 82 MG/DL (ref 65–99)
HBA1C MFR BLD: 6 % (ref 4.8–5.6)
HCT VFR BLD AUTO: 40.4 % (ref 34–46.6)
HDLC SERPL-MCNC: 67 MG/DL (ref 40–60)
HGB BLD-MCNC: 13.5 G/DL (ref 12–15.9)
IMM GRANULOCYTES # BLD AUTO: 0.03 10*3/MM3 (ref 0–0.05)
IMM GRANULOCYTES NFR BLD AUTO: 0.4 % (ref 0–0.5)
LDLC SERPL CALC-MCNC: 83 MG/DL (ref 0–100)
LDLC/HDLC SERPL: 1.21 {RATIO}
LYMPHOCYTES # BLD AUTO: 1.12 10*3/MM3 (ref 0.7–3.1)
LYMPHOCYTES NFR BLD AUTO: 15.6 % (ref 19.6–45.3)
MCH RBC QN AUTO: 33.5 PG (ref 26.6–33)
MCHC RBC AUTO-ENTMCNC: 33.4 G/DL (ref 31.5–35.7)
MCV RBC AUTO: 100.2 FL (ref 79–97)
MONOCYTES # BLD AUTO: 0.92 10*3/MM3 (ref 0.1–0.9)
MONOCYTES NFR BLD AUTO: 12.8 % (ref 5–12)
NEUTROPHILS NFR BLD AUTO: 4.98 10*3/MM3 (ref 1.7–7)
NEUTROPHILS NFR BLD AUTO: 69.2 % (ref 42.7–76)
NRBC BLD AUTO-RTO: 0 /100 WBC (ref 0–0.2)
PLATELET # BLD AUTO: 288 10*3/MM3 (ref 140–450)
PMV BLD AUTO: 9.1 FL (ref 6–12)
POTASSIUM SERPL-SCNC: 4.5 MMOL/L (ref 3.5–5.2)
PROT SERPL-MCNC: 7.1 G/DL (ref 6–8.5)
RBC # BLD AUTO: 4.03 10*6/MM3 (ref 3.77–5.28)
SODIUM SERPL-SCNC: 136 MMOL/L (ref 136–145)
TRIGL SERPL-MCNC: 83 MG/DL (ref 0–150)
VLDLC SERPL-MCNC: 15 MG/DL (ref 5–40)
WBC NRBC COR # BLD AUTO: 7.19 10*3/MM3 (ref 3.4–10.8)

## 2025-03-25 PROCEDURE — 83036 HEMOGLOBIN GLYCOSYLATED A1C: CPT | Performed by: INTERNAL MEDICINE

## 2025-03-25 PROCEDURE — 82785 ASSAY OF IGE: CPT | Performed by: INTERNAL MEDICINE

## 2025-03-25 PROCEDURE — 80053 COMPREHEN METABOLIC PANEL: CPT | Performed by: INTERNAL MEDICINE

## 2025-03-25 PROCEDURE — 85025 COMPLETE CBC W/AUTO DIFF WBC: CPT | Performed by: INTERNAL MEDICINE

## 2025-03-25 PROCEDURE — 80061 LIPID PANEL: CPT | Performed by: INTERNAL MEDICINE

## 2025-03-25 RX ORDER — FLUTICASONE FUROATE, UMECLIDINIUM BROMIDE AND VILANTEROL TRIFENATATE 200; 62.5; 25 UG/1; UG/1; UG/1
1 POWDER RESPIRATORY (INHALATION)
Qty: 60 EACH | Refills: 3 | Status: SHIPPED | OUTPATIENT
Start: 2025-03-25

## 2025-03-25 RX ORDER — CETIRIZINE HYDROCHLORIDE 10 MG/1
10 TABLET ORAL DAILY
COMMUNITY

## 2025-03-25 RX ORDER — MONTELUKAST SODIUM 10 MG/1
10 TABLET ORAL NIGHTLY
Qty: 90 TABLET | Refills: 3 | Status: SHIPPED | OUTPATIENT
Start: 2025-03-25

## 2025-04-04 LAB — IGE SERPL-ACNC: 90 IU/ML (ref 6–495)

## 2025-05-08 ENCOUNTER — HOSPITAL ENCOUNTER (OUTPATIENT)
Dept: SLEEP MEDICINE | Facility: HOSPITAL | Age: 80
Discharge: HOME | End: 2025-05-08
Admitting: STUDENT IN AN ORGANIZED HEALTH CARE EDUCATION/TRAINING PROGRAM
Payer: MEDICARE

## 2025-05-08 DIAGNOSIS — I50.30 DIASTOLIC HEART FAILURE, UNSPECIFIED HF CHRONICITY: ICD-10-CM

## 2025-05-08 DIAGNOSIS — G47.19 EXCESSIVE DAYTIME SLEEPINESS: ICD-10-CM

## 2025-05-08 DIAGNOSIS — J43.9 PULMONARY EMPHYSEMA, UNSPECIFIED EMPHYSEMA TYPE: ICD-10-CM

## 2025-05-08 DIAGNOSIS — R06.83 SNORING: ICD-10-CM

## 2025-05-08 PROCEDURE — 95810 POLYSOM 6/> YRS 4/> PARAM: CPT

## 2025-05-08 RX ORDER — FLUTICASONE FUROATE, UMECLIDINIUM BROMIDE AND VILANTEROL TRIFENATATE 100; 62.5; 25 UG/1; UG/1; UG/1
POWDER RESPIRATORY (INHALATION)
Qty: 180 EACH | Refills: 3 | Status: SHIPPED | OUTPATIENT
Start: 2025-05-08

## 2025-05-19 ENCOUNTER — TELEPHONE (OUTPATIENT)
Dept: SLEEP MEDICINE | Facility: HOSPITAL | Age: 80
End: 2025-05-19
Payer: MEDICARE

## 2025-05-29 ENCOUNTER — OFFICE VISIT (OUTPATIENT)
Dept: SLEEP MEDICINE | Facility: HOSPITAL | Age: 80
End: 2025-05-29
Payer: MEDICARE

## 2025-05-29 VITALS
HEIGHT: 64 IN | HEART RATE: 97 BPM | WEIGHT: 130 LBS | BODY MASS INDEX: 22.2 KG/M2 | SYSTOLIC BLOOD PRESSURE: 136 MMHG | OXYGEN SATURATION: 96 % | DIASTOLIC BLOOD PRESSURE: 68 MMHG

## 2025-05-29 DIAGNOSIS — G47.34 NOCTURNAL HYPOXEMIA: Primary | ICD-10-CM

## 2025-05-29 PROCEDURE — G0463 HOSPITAL OUTPT CLINIC VISIT: HCPCS | Performed by: STUDENT IN AN ORGANIZED HEALTH CARE EDUCATION/TRAINING PROGRAM

## 2025-05-29 PROCEDURE — 3075F SYST BP GE 130 - 139MM HG: CPT | Performed by: STUDENT IN AN ORGANIZED HEALTH CARE EDUCATION/TRAINING PROGRAM

## 2025-05-29 PROCEDURE — 1159F MED LIST DOCD IN RCRD: CPT | Performed by: STUDENT IN AN ORGANIZED HEALTH CARE EDUCATION/TRAINING PROGRAM

## 2025-05-29 PROCEDURE — 3078F DIAST BP <80 MM HG: CPT | Performed by: STUDENT IN AN ORGANIZED HEALTH CARE EDUCATION/TRAINING PROGRAM

## 2025-05-29 PROCEDURE — 1160F RVW MEDS BY RX/DR IN RCRD: CPT | Performed by: STUDENT IN AN ORGANIZED HEALTH CARE EDUCATION/TRAINING PROGRAM

## 2025-05-29 PROCEDURE — 99213 OFFICE O/P EST LOW 20 MIN: CPT | Performed by: STUDENT IN AN ORGANIZED HEALTH CARE EDUCATION/TRAINING PROGRAM

## 2025-05-29 NOTE — PROGRESS NOTES
[facility]   [address]  [place] [address]  [phone]       Sleep Clinic Follow-up Note        PCP: Sam Hampton Jr., MD  Date of visit: 5/29/2025    HISTORY OF PRESENT ILLNESS  Lianne Bardales is a 80 y.o. female following up today, on 5/29/2025 at [facility] for follow up to discuss sleep study results.    She is accompanied by her daughter.  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  She is on 2 L oxygen only at night.  She had an pulse oximetry test on November 4, 2024 which showed 2 hours 46 minutes of time with oxygen saturation less than 88% with lowest oxygen saturation of 82%.  She was referred to our clinic due to nocturnal hypoxemia.  She has a longstanding history of sleep disturbances, characterized by frequent awakenings, but noted a slight improvement since starting oxygen therapy.   She goes to bed at 11 PM and gets up at 4 AM and she wakes up about 2 times during the night to use the restroom.  She sometimes is able to fall back asleep and will get out of bed around 5:30 AM. It takes about an hour to fall asleep.  She believes she sleeps for about 4 to 5 hours per night.    Medical history  Heart failure with preserved ejection fraction  She has a history of central sleep apnea, diagnosed through 2 sleep studies conducted over a decade ago. The first study revealed central sleep apnea, but a subsequent study a year later did not confirm this diagnosis.   She has a history of smoking, having quit 30 years ago after a 27-year habit of smoking 2 packs a day. She has a history of emphysema and COPD.      Medical history  HFpEF  COPD  Lung lobectomy  She has had 2 exacerbations of COPD and pneumonia, one of which required hospitalization in October 2024.SOCIAL  "HISTORY  She smoked for approximately 27 years and quit 30 years ago. She smoked 2 packs a day.     [place] Sleepiness Scale :Total score: 5     REVIEW OF SYSTEMS:   Positive for dry mouth    Result Review   The following data was reviewed by: Clovis Daugherty DO on 05/29/2025:  [x]  Allergies reviewed  [x]  Medications reviewed    SOCIAL (habits pertaining to sleep medicine)  History tobacco use: Former smoker she quit in 1990  History of alcohol use: 5 drinks per week  Caffeine use: 2 cups of coffee and/or soda per day    OB History    No obstetric history on file.      Obstetric Comments   Patient not pregnant or planning pregnancy.                 Objective   Vital Signs:   Vitals:    05/29/25 1300   BP: 136/68   BP Location: Left arm   Patient Position: Sitting   Pulse: 97   SpO2: 96%   Weight: 59 kg (130 lb)   Height: 162.6 cm (64.02\")     Body mass index is 22.3 kg/m².    PHYSICAL EXAMINATION:  CONSTITUTIONAL: Well appearing, in no overt pain or respiratory distress   RESP SYSTEM:  No overt respiratory distress, speaks in clear sentences without dyspnea, no accessory muscle use  CARDIOVASULAR: Regular rate     ASSESSMENT/PLAN  Diagnoses and all orders for this visit:    1. Nocturnal hypoxemia (Primary)  -     Overnight Sleep Oximetry Study; Future      Discussed sleep study results which was negative for PERLITA.  She slept for 324 minutes and has 16.2% of that as REM sleep and obstructive RDI was 4.3/hr.   Discussed her sleep study was done off oxygen and showed significant hypoxemia duration.  There was 126.5 minutes of sleep with oxygen saturation less than 88%.   Overnight oximetry study ordered to be done on her usual regimen of 2 liters at night.   She has an appointment scheduled with pulmonology on June 12, 2025.     Recommended for insomnia cognitive behavioral therapy for insomnia. She will let us know if she decides she wants to do this later on.     FOLLOW UP  No follow-ups on file.  Patient was given " instructions and counseling regarding her condition or for health maintenance advice. Please see specific information pulled into the AVS if appropriate.         Patient's questions were answered.  Dictated Utilizing Dragon Dictation. Please note that portions of this note were completed with a voice recognition program. Part of this note may be an electronic transcription/translation of spoken language to printed text using the Dragon Dictation System.      [facility]   Clovis Daugherty DO  05/29/25  17:05 EDT

## 2025-06-04 ENCOUNTER — HOSPITAL ENCOUNTER (OUTPATIENT)
Dept: RESPIRATORY THERAPY | Facility: HOSPITAL | Age: 80
Discharge: HOME | End: 2025-06-04
Admitting: NURSE PRACTITIONER
Payer: MEDICARE

## 2025-06-04 DIAGNOSIS — R94.4 ABNORMAL RESULTS OF KIDNEY FUNCTION STUDIES: ICD-10-CM

## 2025-06-04 DIAGNOSIS — J43.9 PULMONARY EMPHYSEMA, UNSPECIFIED EMPHYSEMA TYPE: ICD-10-CM

## 2025-06-04 DIAGNOSIS — Z90.2 HISTORY OF LOBECTOMY OF LUNG: ICD-10-CM

## 2025-06-04 DIAGNOSIS — F17.201 TOBACCO ABUSE, IN REMISSION: ICD-10-CM

## 2025-06-04 PROCEDURE — 94729 DIFFUSING CAPACITY: CPT

## 2025-06-04 PROCEDURE — 94726 PLETHYSMOGRAPHY LUNG VOLUMES: CPT

## 2025-06-04 PROCEDURE — 94060 EVALUATION OF WHEEZING: CPT

## 2025-06-04 RX ORDER — ALBUTEROL SULFATE 0.83 MG/ML
2.5 SOLUTION RESPIRATORY (INHALATION) ONCE
Status: COMPLETED | OUTPATIENT
Start: 2025-06-04 | End: 2025-06-04

## 2025-06-04 RX ADMIN — ALBUTEROL SULFATE 2.5 MG: 2.5 SOLUTION RESPIRATORY (INHALATION) at 10:54

## 2025-06-18 DIAGNOSIS — I50.32 CHRONIC DIASTOLIC CONGESTIVE HEART FAILURE: ICD-10-CM

## 2025-06-18 RX ORDER — SPIRONOLACTONE 50 MG/1
50 TABLET, FILM COATED ORAL DAILY
Qty: 90 TABLET | Refills: 0 | Status: SHIPPED | OUTPATIENT
Start: 2025-06-18

## 2025-06-23 NOTE — PROGRESS NOTES
oxygenPrimary Care Provider  Sam Hampton Jr., MD   Referring Provider  No ref. provider found    Patient Complaint  Follow-up, Cough (Due to allergies ), and Results (PFT)    Patient or patient representative verbalized consent for the use of Ambient Listening during the visit with  JOSÉ MIGUEL Connell for chart documentation. 6/24/2025  10:44 EDT      Subjective       History of Presenting Illness  Lianne Bardales is a pleasant 80 y.o. female  of  Dr. Mcgovern who presents to [facility] with history of COPD, persistent cough, kidney disease, seasonal allergies, lung nodule, tobacco abuse in remission here for follow-up appointment.  I last saw the patient 3/20/2025.  She is on Trelegy 200 and albuterol as needed.  She also takes Zyrtec and Singulair for allergy symptoms.    PFT: Suggests moderate obstructive airway disease likely emphysema   AFB: Not collected  Respiratory culture: Not collected    History of Present Illness  The patient is a 80-year-old female who presents for evaluation of emphysema and allergies. She is accompanied by her daughter.    She has been maintaining good health over the past few months, with no hospital admissions or urgent care visits related to her pulmonary condition since her last visit in 03/2025. She was previously on Trelegy 100, but due to a lack of refills, she has been using samples of Trelegy 200. Recently, she received a mail-in prescription for Trelegy 100 and is considering doubling the dosage to match the Trelegy 200 concentration. She prefers to continue with Trelegy 200.    She uses oxygen at night and has both a tank and a concentrator at home. [place]. She inquired about obtaining a portable oxygen concentrator for these visits, but her oxygen levels typically remain above 88-89%, suggesting she may not qualify for portable oxygen.    She  underwent a sleep study and a pulmonary function test. Her sleep medicine specialist, Dr. Daugherty, informed her that she does not have obstructive sleep apnea, but her oxygen levels did decrease. He planned to order an overnight oximetry study, but she has not received any updates regarding this.     She has a history of smoking and exposure to harmful substances without respiratory protection. She does not have anemia.    She continues to experience significant sinus issues, which she attributes to allergies. She is currently taking Zyrtec and Singulair.    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  Tobacco: Quit smoking a long time ago.     At present time patient denies dyspnea, wheezing, headaches, chest pain, weight loss or hemoptysis. Patient denies fevers, chills and night sweats. Lianne Bardales is able to perform ADLs.      I have personally reviewed the review of systems, past family, social, medical and surgical histories; and agree with their findings.      Review of Systems   Constitutional: Negative.    HENT: Negative.     Respiratory:  Positive for cough.         Cough related to allergies   Cardiovascular: Negative.    Musculoskeletal: Negative.    Neurological: Negative.    Psychiatric/Behavioral: Negative.           Family History   Problem Relation Age of Onset   • Cancer Mother    • Diabetes Mother    • Heart failure Mother    • Cancer Brother         Social History     Socioeconomic History   • Marital status:    Tobacco Use   • Smoking status: Former     Current packs/day: 0.00     Average packs/day: 2.0 packs/day for 34.0 years (68.0 ttl pk-yrs)     Types: Cigarettes     Start date:      Quit date: 1990     Years since quittin.4     Passive exposure: Past   • Smokeless tobacco: Never   Vaping Use   • Vaping status: Never Used   Substance and Sexual Activity   • Alcohol use: Yes     Alcohol/week: 2.0 standard drinks of alcohol     Types: 2  Shots of liquor per week   • Drug use: Defer   • Sexual activity: Defer        Past Medical History:   Diagnosis Date   • Acid reflux    • Arthritis    • Asthma    • COPD (chronic obstructive pulmonary disease)    • History of percutaneous coronary intervention    • Hypertension    • Insomnia    • Lung nodule         Immunization History   Administered Date(s) Administered   • Arexvy (RSV, Adults 60+ yrs) 10/29/2024   • COVID-19 (PFIZER) Purple Cap Monovalent 01/21/2021, 02/11/2021, 11/15/2021   • Fluad Quad 65+ 10/18/2022, 10/18/2023   • Fluzone High-Dose 65+YRS 08/18/2016, 10/29/2024   • Fluzone High-Dose 65+yrs 10/30/2020, 10/18/2021   • Fluzone Quad >6mos (Multi-dose) 02/06/2018, 11/01/2018, 11/07/2019   • Hepatitis A 11/01/2018, 07/17/2019   • Influenza TIV (IM) 11/01/2020   • Pneumococcal Conjugate 13-Valent (PCV13) 07/12/2017   • Pneumococcal Conjugate 20-Valent (PCV20) 02/06/2023   • Pneumococcal Polysaccharide (PPSV23) 11/01/2021   • Shingrix 07/17/2019, 11/07/2019   • Zostavax 07/12/2017, 11/01/2018, 11/07/2019       Allergies   Allergen Reactions   • Sulfamethoxazole-Trimethoprim GI Intolerance          Current Outpatient Medications:   •  albuterol sulfate  (90 Base) MCG/ACT inhaler, USE 2 INHALATIONS BY MOUTH EVERY 4 HOURS AS NEEDED FOR WHEEZING  OR SHORTNESS OF AIR (Patient taking differently: Inhale 2 puffs Every 4 (Four) Hours As Needed for Wheezing or Shortness of Air.), Disp: 108 g, Rfl: 11  •  Aspirin Low Dose 81 MG EC tablet, Take 1 tablet by mouth Daily., Disp: , Rfl:   •  cetirizine (zyrTEC) 10 MG tablet, Take 1 tablet by mouth Daily., Disp: , Rfl:   •  empagliflozin (Jardiance) 10 MG tablet tablet, Take 1 tablet by mouth Daily., Disp: 90 tablet, Rfl: 3  •  esomeprazole (nexIUM) 40 MG capsule, TAKE 1 CAPSULE BY MOUTH IN THE  MORNING BEFORE BREAKFAST, Disp: 90 capsule, Rfl: 3  •  Fluticasone-Umeclidin-Vilant (Trelegy Ellipta) 200-62.5-25 MCG/ACT inhaler, Inhale 1 puff Daily., Disp: 3  "each, Rfl: 3  •  losartan (COZAAR) 25 MG tablet, Take 1 tablet by mouth Daily., Disp: 90 tablet, Rfl: 3  •  magnesium 30 MG tablet, Take 1 tablet by mouth 2 (Two) Times a Day., Disp: , Rfl:   •  montelukast (Singulair) 10 MG tablet, Take 1 tablet by mouth Every Night., Disp: 90 tablet, Rfl: 3  •  rosuvastatin (CRESTOR) 40 MG tablet, Take 0.5 tablets by mouth every night at bedtime., Disp: , Rfl:   •  spironolactone (ALDACTONE) 50 MG tablet, TAKE 1 TABLET BY MOUTH DAILY, Disp: 90 tablet, Rfl: 0  •  Fluticasone-Umeclidin-Vilant (Trelegy Ellipta) 200-62.5-25 MCG/ACT inhaler, Inhale 1 puff Daily for 1 day., Disp: 1 each, Rfl: 0         Vital Signs   /67 (BP Location: Right arm, Patient Position: Sitting, Cuff Size: Adult)   Pulse 86   Temp 97.6 °F (36.4 °C)   Resp 16   Ht 162.6 cm (64.02\")   Wt 59 kg (130 lb)   SpO2 94%   BMI 22.30 kg/m²       Objective     Physical Exam  Vitals reviewed.   Constitutional:       General: She is not in acute distress.     Appearance: Normal appearance. She is not ill-appearing.   HENT:      Head: Normocephalic and atraumatic.      Nose: Nose normal.      Mouth/Throat:      Mouth: Mucous membranes are moist.      Pharynx: Oropharynx is clear.   Eyes:      Extraocular Movements: Extraocular movements intact.      Conjunctiva/sclera: Conjunctivae normal.      Pupils: Pupils are equal, round, and reactive to light.   Cardiovascular:      Rate and Rhythm: Normal rate and regular rhythm.      Pulses: Normal pulses.      Heart sounds: Normal heart sounds.   Pulmonary:      Effort: Pulmonary effort is normal. No respiratory distress.      Breath sounds: Normal breath sounds. No stridor. No wheezing, rhonchi or rales.   Abdominal:      General: Bowel sounds are normal.   Musculoskeletal:         General: Normal range of motion.      Cervical back: Normal range of motion and neck supple.   Skin:     General: Skin is warm and dry.   Neurological:      Mental Status: She is alert and " oriented to person, place, and time.   Psychiatric:         Behavior: Behavior normal.         Physical Exam  Heart: Regular rate and rhythm  Lungs: Clear to auscultation bilaterally, no wheezing       Results Review  I have personally reviewed the prior office notes, hospital records, labs, and diagnostics.  Results  Complete PFT - Pre & Post Bronchodilator (Order [id number])  Order-Level Documents:    Scan on 6/4/2025 1142 by Annabelle Olea APRN: PULMONARY FUNCTION TEST, [facility], 06/04/2025         Author: -- Service: -- Author Type: --   Filed: Date of Service: Creation Time:   Status: (Other)     PFT interpretation     Spirometry shows moderate obstructive defect.  FEV1/FVC is 54.   FEV1 is 1.49 liters, 63 percent of predicted.  FVC is 2.75 liters, 88 percent of predicted.     There is no significant response to bronchodilator administration.     Lung volumes:  Lung volumes show air trapping.   Total lung capacity is 6.19 liters, 104 percent of predicted.  Residual volume is 3.06 liters, 130 percent of predicted.        Diffusion capacity is 7.42 mL/min/mmHg, 35 percent of predicted.      Flow volume loop is compatible with obstructive process.     Comparision:  No previous study for comparison.     Conclusion:  Low FEV1/FVC with low FEV1, normal FVC, air trapping and low diffusion capacity.  Suggest moderate obstructive airway disease, likely emphysema.  Diffusion capacity is disproportionately low.  Please correlate clinically.                File Link    Scan on 6/4/2025 1142 by Annabelle Olea APRN: PULMONARY FUNCTION TEST, [facility], 06/04/2025        Key Information    Document ID File Type Document Type Description   [id number] Image PULMONARY FUNCTION TEST - SCAN PULMONARY FUNCTION TEST, [facility], 06/04/2025     Import Information    Attached At Date Time User Dept   Order Level 6/4/2025 11:42 AM Annabelle Olea APRN      Order    Pulmonary Function Test [[id number]]     Encounter    Hospital Encounter on 6/4/25 with   [facility] PULM LAB ROOM 1           Assessment         Patient Active Problem List   Diagnosis   • Acid reflux   • Anemia, iron deficiency   • Asthma   • Gall stones   • Chronic dyspnea   • History of fungal infection   • Lung nodule   • Tobacco abuse, in remission   • Primary hypertension   • Mixed hyperlipidemia   • Coronary artery disease of native artery of native heart with stable angina pectoris   • Intermittent right-sided chest pain   • Chronic diastolic congestive heart failure   • Hypoxia   • UTI (urinary tract infection), bacterial   • Impaired glucose tolerance        Plan     Diagnoses and all orders for this visit:    1. Pulmonary emphysema, unspecified emphysema type (Primary)  -     6 Minute Walk Test; Future  -     Overnight Sleep Oximetry Study; Future  -     Oxygen Therapy    2. Tobacco abuse, in remission  -     6 Minute Walk Test; Future  -     Overnight Sleep Oximetry Study; Future    3. History of lobectomy of lung  -     6 Minute Walk Test; Future  -     Overnight Sleep Oximetry Study; Future  -     Oxygen Therapy    4. Dyspnea on exertion  -     6 Minute Walk Test; Future  -     Overnight Sleep Oximetry Study; Future  -     Oxygen Therapy    5. Asthma, unspecified asthma severity, unspecified whether complicated, unspecified whether persistent  -     Fluticasone-Umeclidin-Vilant (Trelegy Ellipta) 200-62.5-25 MCG/ACT inhaler; Inhale 1 puff Daily.  Dispense: 3 each; Refill: 3  -     Overnight Sleep Oximetry Study; Future    6. COPD with hypoxia  -     Oxygen Therapy    Other orders  -     Fluticasone-Umeclidin-Vilant (Trelegy Ellipta) 200-62.5-25 MCG/ACT inhaler; Inhale 1 puff Daily for 1 day.  Dispense: 1 each; Refill: 0         Assessment & Plan  1. Emphysema.  Her pulmonary function test results indicate moderate emphysema with a moderate airway obstruction. The diffusion capacity is reduced, likely due to her smoking history. She does not have anemia. Her sleep study did not reveal sleep  apnea, but hypoxia was present. A prescription for Trelegy 200, including samples, will be provided. A walk test will be conducted to assess her need for portable oxygen. An overnight oximetry study will be ordered to titrate oxygen for nocturnal hypoxia.    Patient qualified for O2 at 3 L pulsed dose after saturation dropped during 6-minute walk.  New order placed for oxygen for portable oxygen concentrator today.  Patient will follow back up in 2 months to review response to oxygen and to review overnight oximetry study results.      2. Allergies.  She is currently taking Zyrtec. It is recommended to alternate between Zyrtec and Claritin or Allegra every few months to maintain effectiveness.      Smoking status:  reports that she quit smoking about 35 years ago. Her smoking use included cigarettes. She started smoking about 69 years ago. She has a 68 pack-year smoking history. She has been exposed to tobacco smoke. She has never used smokeless tobacco.    Vaccination status: Reviewed  Immunization History   Administered Date(s) Administered   • Arexvy (RSV, Adults 60+ yrs) 10/29/2024   • COVID-19 (PFIZER) Purple Cap Monovalent 01/21/2021, 02/11/2021, 11/15/2021   • Fluad Quad 65+ 10/18/2022, 10/18/2023   • Fluzone High-Dose 65+YRS 08/18/2016, 10/29/2024   • Fluzone High-Dose 65+yrs 10/30/2020, 10/18/2021   • Fluzone Quad >6mos (Multi-dose) 02/06/2018, 11/01/2018, 11/07/2019   • Hepatitis A 11/01/2018, 07/17/2019   • Influenza TIV (IM) 11/01/2020   • Pneumococcal Conjugate 13-Valent (PCV13) 07/12/2017   • Pneumococcal Conjugate 20-Valent (PCV20) 02/06/2023   • Pneumococcal Polysaccharide (PPSV23) 11/01/2021   • Shingrix 07/17/2019, 11/07/2019   • Zostavax 07/12/2017, 11/01/2018, 11/07/2019        Medications personally reviewed    Follow Up  No follow-ups on file.    Patient was given instructions and counseling regarding her condition or for health maintenance advice. Please see specific information pulled into  the AVS if appropriate.     I spent 25 minutes caring for Lianne Bardales on this date of service. This time includes time spent by me in the following activities:preparing for the visit, reviewing tests, obtaining and/or reviewing a separately obtained history, performing a medically appropriate examination and/or evaluation, counseling and educating the patient/family/caregiver, ordering medications, tests, or procedures, documenting information in the medical record, independently interpreting results and communicating that information with the patient/family/caregiver and answered questions family members, discuss medications.

## 2025-06-24 ENCOUNTER — OFFICE VISIT (OUTPATIENT)
Dept: PULMONOLOGY | Facility: CLINIC | Age: 80
End: 2025-06-24
Payer: MEDICARE

## 2025-06-24 VITALS
HEIGHT: 64 IN | WEIGHT: 130 LBS | TEMPERATURE: 97.6 F | SYSTOLIC BLOOD PRESSURE: 112 MMHG | RESPIRATION RATE: 16 BRPM | BODY MASS INDEX: 22.2 KG/M2 | OXYGEN SATURATION: 94 % | DIASTOLIC BLOOD PRESSURE: 67 MMHG | HEART RATE: 86 BPM

## 2025-06-24 DIAGNOSIS — J43.9 PULMONARY EMPHYSEMA, UNSPECIFIED EMPHYSEMA TYPE: Primary | ICD-10-CM

## 2025-06-24 DIAGNOSIS — J44.9 COPD WITH HYPOXIA: ICD-10-CM

## 2025-06-24 DIAGNOSIS — Z90.2 HISTORY OF LOBECTOMY OF LUNG: ICD-10-CM

## 2025-06-24 DIAGNOSIS — F17.201 TOBACCO ABUSE, IN REMISSION: ICD-10-CM

## 2025-06-24 DIAGNOSIS — R06.09 DYSPNEA ON EXERTION: ICD-10-CM

## 2025-06-24 DIAGNOSIS — J45.909 ASTHMA, UNSPECIFIED ASTHMA SEVERITY, UNSPECIFIED WHETHER COMPLICATED, UNSPECIFIED WHETHER PERSISTENT: ICD-10-CM

## 2025-06-24 PROCEDURE — 1159F MED LIST DOCD IN RCRD: CPT | Performed by: NURSE PRACTITIONER

## 2025-06-24 PROCEDURE — 3074F SYST BP LT 130 MM HG: CPT | Performed by: NURSE PRACTITIONER

## 2025-06-24 PROCEDURE — 3078F DIAST BP <80 MM HG: CPT | Performed by: NURSE PRACTITIONER

## 2025-06-24 PROCEDURE — 99214 OFFICE O/P EST MOD 30 MIN: CPT | Performed by: NURSE PRACTITIONER

## 2025-06-24 PROCEDURE — 1160F RVW MEDS BY RX/DR IN RCRD: CPT | Performed by: NURSE PRACTITIONER

## 2025-06-24 RX ORDER — FLUTICASONE FUROATE, UMECLIDINIUM BROMIDE AND VILANTEROL TRIFENATATE 200; 62.5; 25 UG/1; UG/1; UG/1
1 POWDER RESPIRATORY (INHALATION)
Qty: 3 EACH | Refills: 3 | Status: SHIPPED | OUTPATIENT
Start: 2025-06-24

## 2025-06-24 RX ORDER — FLUTICASONE FUROATE, UMECLIDINIUM BROMIDE AND VILANTEROL TRIFENATATE 200; 62.5; 25 UG/1; UG/1; UG/1
1 POWDER RESPIRATORY (INHALATION)
Qty: 1 EACH | Refills: 0 | COMMUNITY
Start: 2025-06-24 | End: 2025-06-25

## 2025-07-02 DIAGNOSIS — E78.49 OTHER HYPERLIPIDEMIA: ICD-10-CM

## 2025-07-03 ENCOUNTER — TRANSCRIBE ORDERS (OUTPATIENT)
Dept: ADMINISTRATIVE | Facility: HOSPITAL | Age: 80
End: 2025-07-03
Payer: MEDICARE

## 2025-07-03 DIAGNOSIS — E78.5 HYPERLIPIDEMIA, UNSPECIFIED HYPERLIPIDEMIA TYPE: Primary | ICD-10-CM

## 2025-07-03 DIAGNOSIS — N18.31 CHRONIC KIDNEY DISEASE, STAGE 3A: ICD-10-CM

## 2025-07-03 RX ORDER — ROSUVASTATIN CALCIUM 40 MG/1
40 TABLET, COATED ORAL
Qty: 90 TABLET | Refills: 3 | Status: SHIPPED | OUTPATIENT
Start: 2025-07-03

## 2025-07-21 ENCOUNTER — HOSPITAL ENCOUNTER (OUTPATIENT)
Dept: ULTRASOUND IMAGING | Facility: HOSPITAL | Age: 80
Discharge: HOME | End: 2025-07-21
Admitting: NURSE PRACTITIONER
Payer: MEDICARE

## 2025-07-21 DIAGNOSIS — N18.31 CHRONIC KIDNEY DISEASE, STAGE 3A: ICD-10-CM

## 2025-07-21 DIAGNOSIS — E78.5 HYPERLIPIDEMIA, UNSPECIFIED HYPERLIPIDEMIA TYPE: ICD-10-CM

## 2025-07-21 PROCEDURE — 76775 US EXAM ABDO BACK WALL LIM: CPT

## 2025-08-19 ENCOUNTER — OFFICE VISIT (OUTPATIENT)
Dept: PULMONOLOGY | Facility: CLINIC | Age: 80
End: 2025-08-19
Payer: MEDICARE

## 2025-08-19 VITALS
SYSTOLIC BLOOD PRESSURE: 122 MMHG | HEART RATE: 86 BPM | BODY MASS INDEX: 21.68 KG/M2 | TEMPERATURE: 97.6 F | OXYGEN SATURATION: 95 % | WEIGHT: 127 LBS | RESPIRATION RATE: 16 BRPM | DIASTOLIC BLOOD PRESSURE: 62 MMHG | HEIGHT: 64 IN

## 2025-08-19 DIAGNOSIS — J43.9 PULMONARY EMPHYSEMA, UNSPECIFIED EMPHYSEMA TYPE: Primary | ICD-10-CM

## 2025-08-19 DIAGNOSIS — J44.9 COPD WITH HYPOXIA: ICD-10-CM

## 2025-08-19 DIAGNOSIS — F17.201 TOBACCO ABUSE, IN REMISSION: ICD-10-CM

## 2025-08-19 DIAGNOSIS — G47.34 NOCTURNAL HYPOXIA: ICD-10-CM

## 2025-08-19 DIAGNOSIS — R06.09 DYSPNEA ON EXERTION: ICD-10-CM

## 2025-08-19 DIAGNOSIS — Z90.2 HISTORY OF LOBECTOMY OF LUNG: ICD-10-CM

## 2025-08-19 PROCEDURE — 99214 OFFICE O/P EST MOD 30 MIN: CPT | Performed by: NURSE PRACTITIONER

## 2025-08-19 PROCEDURE — 3078F DIAST BP <80 MM HG: CPT | Performed by: NURSE PRACTITIONER

## 2025-08-19 PROCEDURE — 1160F RVW MEDS BY RX/DR IN RCRD: CPT | Performed by: NURSE PRACTITIONER

## 2025-08-19 PROCEDURE — 1159F MED LIST DOCD IN RCRD: CPT | Performed by: NURSE PRACTITIONER

## 2025-08-19 PROCEDURE — 3074F SYST BP LT 130 MM HG: CPT | Performed by: NURSE PRACTITIONER

## 2025-08-19 RX ORDER — CHOLECALCIFEROL (VITAMIN D3) 25 MCG
1000 TABLET ORAL DAILY
COMMUNITY

## 2025-08-19 RX ORDER — LORATADINE 10 MG/1
10 TABLET ORAL DAILY
COMMUNITY

## 2025-08-19 RX ORDER — MULTIVIT WITH MINERALS/LUTEIN
400 TABLET ORAL
COMMUNITY

## 2025-08-28 ENCOUNTER — TRANSCRIBE ORDERS (OUTPATIENT)
Dept: LAB | Facility: HOSPITAL | Age: 80
End: 2025-08-28
Payer: MEDICARE

## 2025-08-28 ENCOUNTER — LAB (OUTPATIENT)
Dept: LAB | Facility: HOSPITAL | Age: 80
End: 2025-08-28
Payer: MEDICARE

## 2025-08-28 DIAGNOSIS — I10 ESSENTIAL HYPERTENSION, MALIGNANT: Primary | ICD-10-CM

## 2025-08-28 DIAGNOSIS — I10 ESSENTIAL HYPERTENSION, MALIGNANT: ICD-10-CM

## 2025-08-28 DIAGNOSIS — I25.84 CORONARY ATHEROSCLEROSIS DUE TO SEVERELY CALCIFIED CORONARY LESION: ICD-10-CM

## 2025-08-28 DIAGNOSIS — I50.9 HEART FAILURE, UNSPECIFIED HF CHRONICITY, UNSPECIFIED HEART FAILURE TYPE: ICD-10-CM

## 2025-08-28 DIAGNOSIS — E78.5 HYPERLIPIDEMIA, UNSPECIFIED HYPERLIPIDEMIA TYPE: ICD-10-CM

## 2025-08-28 DIAGNOSIS — I50.32 CHRONIC DIASTOLIC CONGESTIVE HEART FAILURE: ICD-10-CM

## 2025-08-28 LAB
25(OH)D3 SERPL-MCNC: 68.3 NG/ML (ref 30–100)
ALBUMIN SERPL-MCNC: 4.5 G/DL (ref 3.5–5.2)
ANION GAP SERPL CALCULATED.3IONS-SCNC: 13.3 MMOL/L (ref 5–15)
BACTERIA UR QL AUTO: ABNORMAL /HPF
BASOPHILS # BLD AUTO: 0.06 10*3/MM3 (ref 0–0.2)
BASOPHILS NFR BLD AUTO: 0.9 % (ref 0–1.5)
BILIRUB UR QL STRIP: NEGATIVE
BUN SERPL-MCNC: 27 MG/DL (ref 8–23)
BUN/CREAT SERPL: 24.1 (ref 7–25)
CALCIUM SPEC-SCNC: 10.2 MG/DL (ref 8.6–10.5)
CHLORIDE SERPL-SCNC: 101 MMOL/L (ref 98–107)
CLARITY UR: ABNORMAL
CO2 SERPL-SCNC: 22.7 MMOL/L (ref 22–29)
COLOR UR: YELLOW
CREAT SERPL-MCNC: 1.12 MG/DL (ref 0.57–1)
CREAT UR-MCNC: 65.8 MG/DL
DEPRECATED RDW RBC AUTO: 47.1 FL (ref 37–54)
EGFRCR SERPLBLD CKD-EPI 2021: 50.1 ML/MIN/1.73
EOSINOPHIL # BLD AUTO: 0.13 10*3/MM3 (ref 0–0.4)
EOSINOPHIL NFR BLD AUTO: 1.9 % (ref 0.3–6.2)
ERYTHROCYTE [DISTWIDTH] IN BLOOD BY AUTOMATED COUNT: 12.8 % (ref 12.3–15.4)
GLUCOSE SERPL-MCNC: 99 MG/DL (ref 65–99)
GLUCOSE UR STRIP-MCNC: ABNORMAL MG/DL
HCT VFR BLD AUTO: 42.5 % (ref 34–46.6)
HGB BLD-MCNC: 14.3 G/DL (ref 12–15.9)
HGB UR QL STRIP.AUTO: NEGATIVE
HYALINE CASTS UR QL AUTO: ABNORMAL /LPF
IMM GRANULOCYTES # BLD AUTO: 0.03 10*3/MM3 (ref 0–0.05)
IMM GRANULOCYTES NFR BLD AUTO: 0.4 % (ref 0–0.5)
KETONES UR QL STRIP: ABNORMAL
LEUKOCYTE ESTERASE UR QL STRIP.AUTO: ABNORMAL
LYMPHOCYTES # BLD AUTO: 1.02 10*3/MM3 (ref 0.7–3.1)
LYMPHOCYTES NFR BLD AUTO: 14.7 % (ref 19.6–45.3)
MCH RBC QN AUTO: 34 PG (ref 26.6–33)
MCHC RBC AUTO-ENTMCNC: 33.6 G/DL (ref 31.5–35.7)
MCV RBC AUTO: 101 FL (ref 79–97)
MONOCYTES # BLD AUTO: 0.76 10*3/MM3 (ref 0.1–0.9)
MONOCYTES NFR BLD AUTO: 10.9 % (ref 5–12)
NEUTROPHILS NFR BLD AUTO: 4.96 10*3/MM3 (ref 1.7–7)
NEUTROPHILS NFR BLD AUTO: 71.2 % (ref 42.7–76)
NITRITE UR QL STRIP: NEGATIVE
NRBC BLD AUTO-RTO: 0 /100 WBC (ref 0–0.2)
PH UR STRIP.AUTO: 6 [PH] (ref 5–8)
PHOSPHATE SERPL-MCNC: 3.3 MG/DL (ref 2.5–4.5)
PLATELET # BLD AUTO: 281 10*3/MM3 (ref 140–450)
PMV BLD AUTO: 9.4 FL (ref 6–12)
POTASSIUM SERPL-SCNC: 4.6 MMOL/L (ref 3.5–5.2)
PROT ?TM UR-MCNC: 15.9 MG/DL
PROT UR QL STRIP: NEGATIVE
PROT/CREAT UR: 0.24 MG/G{CREAT}
PTH-INTACT SERPL-MCNC: 18.4 PG/ML (ref 15–65)
RBC # BLD AUTO: 4.21 10*6/MM3 (ref 3.77–5.28)
RBC # UR STRIP: ABNORMAL /HPF
REF LAB TEST METHOD: ABNORMAL
SODIUM SERPL-SCNC: 137 MMOL/L (ref 136–145)
SP GR UR STRIP: 1.02 (ref 1–1.03)
SQUAMOUS #/AREA URNS HPF: ABNORMAL /HPF
UROBILINOGEN UR QL STRIP: ABNORMAL
WBC # UR STRIP: ABNORMAL /HPF
WBC NRBC COR # BLD AUTO: 6.96 10*3/MM3 (ref 3.4–10.8)

## 2025-08-28 PROCEDURE — 85025 COMPLETE CBC W/AUTO DIFF WBC: CPT

## 2025-08-28 PROCEDURE — 82306 VITAMIN D 25 HYDROXY: CPT

## 2025-08-28 PROCEDURE — 80069 RENAL FUNCTION PANEL: CPT

## 2025-08-28 PROCEDURE — 36415 COLL VENOUS BLD VENIPUNCTURE: CPT

## 2025-08-28 PROCEDURE — 82570 ASSAY OF URINE CREATININE: CPT

## 2025-08-28 PROCEDURE — 84156 ASSAY OF PROTEIN URINE: CPT

## 2025-08-28 PROCEDURE — 83970 ASSAY OF PARATHORMONE: CPT

## 2025-08-28 PROCEDURE — 81001 URINALYSIS AUTO W/SCOPE: CPT

## 2025-08-28 RX ORDER — EMPAGLIFLOZIN 10 MG/1
10 TABLET, FILM COATED ORAL DAILY
Qty: 90 TABLET | Refills: 1 | Status: SHIPPED | OUTPATIENT
Start: 2025-08-28